# Patient Record
Sex: MALE | Race: WHITE | URBAN - METROPOLITAN AREA
[De-identification: names, ages, dates, MRNs, and addresses within clinical notes are randomized per-mention and may not be internally consistent; named-entity substitution may affect disease eponyms.]

---

## 2023-07-05 ENCOUNTER — HOSPITAL ENCOUNTER (OUTPATIENT)
Age: 78
Setting detail: SPECIMEN
Discharge: HOME | End: 2023-07-05

## 2023-07-05 LAB
ALBUMIN SERPL-MCNC: 4.4 G/DL (ref 3.5–5.2)
ALBUMIN/GLOB SERPL: 1.7 {RATIO} (ref 1–2.5)
ALP SERPL-CCNC: 74 U/L (ref 40–129)
ALT SERPL-CCNC: 26 U/L (ref 5–41)
ANION GAP SERPL CALCULATED.3IONS-SCNC: 16 MMOL/L (ref 9–17)
AST SERPL-CCNC: 28 U/L
BASOPHILS # BLD: 0.04 K/UL (ref 0–0.2)
BASOPHILS NFR BLD: 1 % (ref 0–2)
BILIRUB SERPL-MCNC: 0.6 MG/DL (ref 0.3–1.2)
BUN SERPL-MCNC: 18 MG/DL (ref 8–23)
CALCIUM SERPL-MCNC: 9.1 MG/DL (ref 8.6–10.4)
CHLORIDE SERPL-SCNC: 109 MMOL/L (ref 98–107)
CHOLEST SERPL-MCNC: 109 MG/DL
CHOLESTEROL/HDL RATIO: 2.1
CO2 SERPL-SCNC: 22 MMOL/L (ref 20–31)
CREAT SERPL-MCNC: 0.92 MG/DL (ref 0.7–1.2)
EOSINOPHIL # BLD: 0.17 K/UL (ref 0–0.44)
EOSINOPHILS RELATIVE PERCENT: 4 % (ref 1–4)
ERYTHROCYTE [DISTWIDTH] IN BLOOD BY AUTOMATED COUNT: 12.1 % (ref 11.8–14.4)
EST. AVERAGE GLUCOSE BLD GHB EST-MCNC: 105 MG/DL
GFR SERPL CREATININE-BSD FRML MDRD: >60 ML/MIN/1.73M2
GLUCOSE SERPL-MCNC: 102 MG/DL (ref 70–99)
HBA1C MFR BLD: 5.3 % (ref 4–6)
HCT VFR BLD AUTO: 44.1 % (ref 40.7–50.3)
HDLC SERPL-MCNC: 51 MG/DL
HGB BLD-MCNC: 15 G/DL (ref 13–17)
IMM GRANULOCYTES # BLD AUTO: <0.03 K/UL (ref 0–0.3)
IMM GRANULOCYTES NFR BLD: 0 %
LDLC SERPL CALC-MCNC: 45 MG/DL (ref 0–130)
LYMPHOCYTES # BLD: 29 % (ref 24–43)
LYMPHOCYTES NFR BLD: 1.14 K/UL (ref 1.1–3.7)
MCH RBC QN AUTO: 31.6 PG (ref 25.2–33.5)
MCHC RBC AUTO-ENTMCNC: 34 G/DL (ref 28.4–34.8)
MCV RBC AUTO: 92.8 FL (ref 82.6–102.9)
MONOCYTES NFR BLD: 0.35 K/UL (ref 0.1–1.2)
MONOCYTES NFR BLD: 9 % (ref 3–12)
NEUTROPHILS NFR BLD: 57 % (ref 36–65)
NEUTS SEG NFR BLD: 2.27 K/UL (ref 1.5–8.1)
NRBC BLD-RTO: 0 PER 100 WBC
PLATELET # BLD AUTO: 163 K/UL (ref 138–453)
PMV BLD AUTO: 9.5 FL (ref 8.1–13.5)
POTASSIUM SERPL-SCNC: 4.4 MMOL/L (ref 3.7–5.3)
PROT SERPL-MCNC: 7 G/DL (ref 6.4–8.3)
RBC # BLD AUTO: 4.75 M/UL (ref 4.21–5.77)
SODIUM SERPL-SCNC: 147 MMOL/L (ref 135–144)
T4 FREE SERPL-MCNC: 1.4 NG/DL (ref 0.9–1.7)
TRIGL SERPL-MCNC: 63 MG/DL
TSH SERPL DL<=0.05 MIU/L-ACNC: 0.48 UIU/ML (ref 0.3–5)
WBC OTHER # BLD: 4 K/UL (ref 3.5–11.3)

## 2023-10-09 ENCOUNTER — TELEPHONE (OUTPATIENT)
Age: 78
End: 2023-10-09

## 2023-10-09 DIAGNOSIS — Z01.818 PRE-OP EXAM: Primary | ICD-10-CM

## 2023-10-09 NOTE — TELEPHONE ENCOUNTER
Kee has been scheduled for sx on 10/25. I called and talked to him Thursday letting him know about the PAT on 10/11. Then I called him today to ask him to come to the office and sign consent. He said he would do that Wednesday. I already got clearance from his cardiologist Dr. Gloria Marinelli. I just need PCP clearance.

## 2023-10-11 ENCOUNTER — HOSPITAL ENCOUNTER (OUTPATIENT)
Dept: PREADMISSION TESTING | Age: 78
Discharge: HOME | End: 2023-10-11
Payer: MEDICARE

## 2023-10-11 VITALS
OXYGEN SATURATION: 100 % | RESPIRATION RATE: 16 BRPM | SYSTOLIC BLOOD PRESSURE: 119 MMHG | HEIGHT: 74 IN | DIASTOLIC BLOOD PRESSURE: 77 MMHG | HEART RATE: 55 BPM | WEIGHT: 196 LBS | BODY MASS INDEX: 25.15 KG/M2

## 2023-10-11 LAB
ANION GAP SERPL CALCULATED.3IONS-SCNC: 5 MMOL/L (ref 9–17)
BASOPHILS # BLD: 0 K/UL (ref 0–0.2)
BASOPHILS NFR BLD: 0 % (ref 0–2)
BUN SERPL-MCNC: 18 MG/DL (ref 8–23)
CALCIUM SERPL-MCNC: 8.5 MG/DL (ref 8.6–10.4)
CHLORIDE SERPL-SCNC: 106 MMOL/L (ref 98–107)
CO2 SERPL-SCNC: 30 MMOL/L (ref 20–31)
CREAT SERPL-MCNC: 0.9 MG/DL (ref 0.7–1.2)
EOSINOPHIL # BLD: 0.2 K/UL (ref 0–0.4)
EOSINOPHILS RELATIVE PERCENT: 5 % (ref 1–4)
ERYTHROCYTE [DISTWIDTH] IN BLOOD BY AUTOMATED COUNT: 13.6 % (ref 12.5–15.4)
GFR SERPL CREATININE-BSD FRML MDRD: >60 ML/MIN/1.73M2
GLUCOSE SERPL-MCNC: 117 MG/DL (ref 70–99)
HCT VFR BLD AUTO: 38.8 % (ref 41–53)
HGB BLD-MCNC: 13.9 G/DL (ref 13.5–17.5)
LYMPHOCYTES NFR BLD: 1 K/UL (ref 1–4.8)
LYMPHOCYTES RELATIVE PERCENT: 27 % (ref 24–44)
MCH RBC QN AUTO: 32.6 PG (ref 26–34)
MCHC RBC AUTO-ENTMCNC: 35.7 G/DL (ref 31–37)
MCV RBC AUTO: 91.5 FL (ref 80–100)
MONOCYTES NFR BLD: 0.4 K/UL (ref 0.1–1.2)
MONOCYTES NFR BLD: 10 % (ref 2–11)
NEUTROPHILS NFR BLD: 58 % (ref 36–66)
NEUTS SEG NFR BLD: 2.1 K/UL (ref 1.8–7.7)
PLATELET # BLD AUTO: 149 K/UL (ref 140–450)
PMV BLD AUTO: 7 FL (ref 6–12)
POTASSIUM SERPL-SCNC: 4.6 MMOL/L (ref 3.7–5.3)
RBC # BLD AUTO: 4.24 M/UL (ref 4.5–5.9)
SODIUM SERPL-SCNC: 141 MMOL/L (ref 135–144)
WBC OTHER # BLD: 3.6 K/UL (ref 3.5–11)

## 2023-10-11 PROCEDURE — 80048 BASIC METABOLIC PNL TOTAL CA: CPT

## 2023-10-11 PROCEDURE — 36415 COLL VENOUS BLD VENIPUNCTURE: CPT

## 2023-10-11 PROCEDURE — 87641 MR-STAPH DNA AMP PROBE: CPT

## 2023-10-11 PROCEDURE — 85025 COMPLETE CBC W/AUTO DIFF WBC: CPT

## 2023-10-11 RX ORDER — LEVOTHYROXINE SODIUM 0.2 MG/1
200 TABLET ORAL DAILY
COMMUNITY

## 2023-10-11 RX ORDER — PRIMIDONE 50 MG/1
TABLET ORAL
COMMUNITY
Start: 2023-08-27

## 2023-10-11 RX ORDER — TAMSULOSIN HYDROCHLORIDE 0.4 MG/1
CAPSULE ORAL
COMMUNITY
Start: 2023-08-16

## 2023-10-11 RX ORDER — AMOXICILLIN 500 MG/1
2000 TABLET, FILM COATED ORAL
COMMUNITY
Start: 2023-07-17

## 2023-10-11 RX ORDER — ATORVASTATIN CALCIUM 80 MG/1
TABLET, FILM COATED ORAL
COMMUNITY
Start: 2023-07-10

## 2023-10-11 RX ORDER — LATANOPROST 50 UG/ML
SOLUTION/ DROPS OPHTHALMIC
COMMUNITY
Start: 2023-09-13

## 2023-10-11 RX ORDER — METOPROLOL SUCCINATE 50 MG/1
TABLET, EXTENDED RELEASE ORAL 2 TIMES DAILY
COMMUNITY
Start: 2023-07-10

## 2023-10-11 ASSESSMENT — PAIN SCALES - GENERAL: PAINLEVEL_OUTOF10: 4

## 2023-10-11 ASSESSMENT — PAIN DESCRIPTION - LOCATION: LOCATION: HIP

## 2023-10-11 ASSESSMENT — PAIN DESCRIPTION - ORIENTATION: ORIENTATION: RIGHT

## 2023-10-11 ASSESSMENT — PAIN DESCRIPTION - ONSET: ONSET: AWAKENED FROM SLEEP

## 2023-10-11 ASSESSMENT — PAIN - FUNCTIONAL ASSESSMENT: PAIN_FUNCTIONAL_ASSESSMENT: PREVENTS OR INTERFERES SOME ACTIVE ACTIVITIES AND ADLS

## 2023-10-11 ASSESSMENT — PAIN DESCRIPTION - PAIN TYPE: TYPE: CHRONIC PAIN

## 2023-10-12 LAB
MRSA, DNA, NASAL: NEGATIVE
SPECIMEN DESCRIPTION: NORMAL

## 2023-10-13 ENCOUNTER — ANESTHESIA EVENT (OUTPATIENT)
Dept: OPERATING ROOM | Age: 78
End: 2023-10-13
Payer: MEDICARE

## 2023-10-13 NOTE — PROGRESS NOTES
[facility] ([facility]) Joint Replacement Pre-surgical Assessment    Scheduled Surgery Date: 10/25/23  Surgery Time: 1000    Surgeon: Dr. Cole Rice  Procedure: right Total Hip    Primary Insurance Coverage [facility] Medicare Complete  Pre-op class attended 10/11/23    PCP: Sandy Medeiros MD  Clearance received by PCP: Yes    Anticipated Discharge Plan: home  Agency (if applicable):     Significant PMH:   Surgical History    Procedure Laterality Date Comment Source   CARDIAC SURGERY       CHOLECYSTECTOMY       COLONOSCOPY       CORONARY ANGIOPLASTY WITH STENT PLACEMENT  02/2022 x3    CORONARY ARTERY BYPASS GRAFT  02/2022     JOINT REPLACEMENT Left  hip    TONSILLECTOMY                 Medical History    Diagnosis Date Comment Source   Arthritis      Atrial fibrillation ([address])      CAD (coronary artery disease) 02/2022 x3 stents    Essential tremor      Glaucoma      Hyperlipidemia      Hypertension      Scoliosis      Thyroid disease      Under care of team  cardiology- Dr. Gloria Marinelli           Smoking history: none    Alcohol history: Current alcohol use: occasionally 1-2 times per week    Concerns prior to surgery: None reported    Electronically signed by: Pancho Acevedo RN on 10/13/2023 at 1:54 PM

## 2023-10-17 ENCOUNTER — NURSE ONLY (OUTPATIENT)
Age: 78
End: 2023-10-17

## 2023-10-17 NOTE — PROGRESS NOTES
After obtaining consent, and per orders of Dr. Rowan Castillo, injection of Fluad given in Right deltoid by Kieran Jung MA. Patient instructed to remain in clinic for 20 minutes afterwards, and to report any adverse reaction to me immediately.

## 2023-10-23 ENCOUNTER — CASE MANAGEMENT (OUTPATIENT)
Age: 78
End: 2023-10-23

## 2023-10-23 NOTE — PROGRESS NOTES
Pre-op phone call    Spoke with patient preop concerning:     Procedure right  Hip arthroplasty with Dr. Aleisha Wesley  on  10/25/23 . DME: Patient has rolling walker. DME: Patient needs  none . Therapy after surgery: Pt states he will do home exercise. Other concerns: No concerns reported.

## 2023-10-24 NOTE — H&P
ORTHOPEDIC PREOP HISTORY AND PHYSICAL      HPI / Chief Complaint  Giovanna Ragsdale is a 77 y.o. male who presents for right hip pain    Past Medical History  Stephanie Carson  has a past medical history of Arthritis, Atrial fibrillation ([address]), CAD (coronary artery disease), Essential tremor, Glaucoma, Hyperlipidemia, Hypertension, Scoliosis, Thyroid disease, and Under care of team.    Past Surgical History  Stephanie Carson  has a past surgical history that includes Coronary artery bypass graft (02/2022); Cardiac surgery; joint replacement (Left); Tonsillectomy; Coronary angioplasty with stent (02/2022); Cholecystectomy; and Colonoscopy. Current Medications  Current Facility-Administered Medications   Medication Dose Route Frequency Provider Last Rate Last Admin    [START ON 10/25/2023] morphine 10 mg, ketorolac (TORADOL) 30 MG/ML 30 mg, EPINEPHrine 0.5 mg, ropivacaine (NAROPIN) 0.5% 60 mL IV syringe   Other Once Olga Vargas MD         Current Outpatient Medications   Medication Sig Dispense Refill    amoxicillin (AMOXIL) 500 MG tablet Take 4 tablets by mouth once as needed For dental appointments      calcium carbonate 1500 (600 Ca) MG TABS tablet Take 1 tablet by mouth daily (with breakfast)      FERROUS SULFATE PO Take 65 mg by mouth daily (with breakfast)      Multiple Vitamin (MULTIVITAMIN ADULT PO) Take 0.5 tablets by mouth 2 times daily      apixaban (ELIQUIS) 5 MG TABS tablet Take 1 tablet twice a day by oral route. Aspirin 81 MG CAPS Take 1 capsule every day by oral route. (Patient not taking: Reported on 10/11/2023)      atorvastatin (LIPITOR) 80 MG tablet       latanoprost (XALATAN) 0.005 % ophthalmic solution       levothyroxine (SYNTHROID) 200 MCG tablet Take 1 tablet by mouth daily      metoprolol succinate (TOPROL XL) 50 MG extended release tablet in the morning and at bedtime      primidone (MYSOLINE) 50 MG tablet       tamsulosin (FLOMAX) 0.4 MG capsule          Allergies  Allergies have been reviewed. Clive Chavez is allergic to poison ivy extract and ropinirole. Social History  Clive Chavez  reports that he has never smoked. He has never used smokeless tobacco. He reports current alcohol use. He reports that he does not use drugs. Family History  Prince's family history is not on file. Review of Systems   History obtained from the patient. REVIEW OF SYSTEMS:   Constitution: negative for fever, chills    Physical Exam  There were no vitals taken for this visit. General Appearance: in no distress  HEENT: Normocephalic  CV: brisk cap refill to extremities  Lungs: Nonlabored breathing  Abdomen: soft  Extremities: Right hip skin intact    Assessment and Plan  Hira Ramos is a 77 y.o. old male with right hip osteoarthritis. Plan is for right anterior total hip replacement. This note is created with the assistance of a speech recognition program.  While intending to generate a document that actually reflects the content of the visit, the document can still have some errors including those of syntax and sound a like substitutions which may escape proof reading. It such instances, actual meaning can be extrapolated by contextual diversion.

## 2023-10-25 ENCOUNTER — APPOINTMENT (OUTPATIENT)
Dept: GENERAL RADIOLOGY | Age: 78
End: 2023-10-25
Attending: ORTHOPAEDIC SURGERY
Payer: MEDICARE

## 2023-10-25 ENCOUNTER — HOSPITAL ENCOUNTER (OUTPATIENT)
Age: 78
Discharge: HOME | End: 2023-10-26
Attending: ORTHOPAEDIC SURGERY | Admitting: ORTHOPAEDIC SURGERY
Payer: MEDICARE

## 2023-10-25 ENCOUNTER — ANESTHESIA (OUTPATIENT)
Dept: OPERATING ROOM | Age: 78
End: 2023-10-25
Payer: MEDICARE

## 2023-10-25 DIAGNOSIS — G89.18 POST-OP PAIN: Primary | ICD-10-CM

## 2023-10-25 PROBLEM — M16.11 OSTEOARTHRITIS OF RIGHT HIP, UNSPECIFIED OSTEOARTHRITIS TYPE: Status: ACTIVE | Noted: 2023-10-25

## 2023-10-25 PROCEDURE — 3600000005 HC SURGERY LEVEL 5 BASE: Performed by: ORTHOPAEDIC SURGERY

## 2023-10-25 PROCEDURE — 97535 SELF CARE MNGMENT TRAINING: CPT

## 2023-10-25 PROCEDURE — G0378 HOSPITAL OBSERVATION PER HR: HCPCS

## 2023-10-25 PROCEDURE — C1776 JOINT DEVICE (IMPLANTABLE): HCPCS | Performed by: ORTHOPAEDIC SURGERY

## 2023-10-25 PROCEDURE — 97162 PT EVAL MOD COMPLEX 30 MIN: CPT

## 2023-10-25 PROCEDURE — 97166 OT EVAL MOD COMPLEX 45 MIN: CPT

## 2023-10-25 PROCEDURE — 97110 THERAPEUTIC EXERCISES: CPT

## 2023-10-25 PROCEDURE — 6370000000 HC RX 637 (ALT 250 FOR IP)

## 2023-10-25 PROCEDURE — 7100000001 HC PACU RECOVERY - ADDTL 15 MIN: Performed by: ORTHOPAEDIC SURGERY

## 2023-10-25 PROCEDURE — 2580000003 HC RX 258: Performed by: ORTHOPAEDIC SURGERY

## 2023-10-25 PROCEDURE — 27130 TOTAL HIP ARTHROPLASTY: CPT | Performed by: ORTHOPAEDIC SURGERY

## 2023-10-25 PROCEDURE — 2500000003 HC RX 250 WO HCPCS: Performed by: REGISTERED NURSE

## 2023-10-25 PROCEDURE — 6360000002 HC RX W HCPCS: Performed by: ORTHOPAEDIC SURGERY

## 2023-10-25 PROCEDURE — 97116 GAIT TRAINING THERAPY: CPT

## 2023-10-25 PROCEDURE — 2709999900 HC NON-CHARGEABLE SUPPLY: Performed by: ORTHOPAEDIC SURGERY

## 2023-10-25 PROCEDURE — 2500000003 HC RX 250 WO HCPCS: Performed by: ORTHOPAEDIC SURGERY

## 2023-10-25 PROCEDURE — 7100000000 HC PACU RECOVERY - FIRST 15 MIN: Performed by: ORTHOPAEDIC SURGERY

## 2023-10-25 PROCEDURE — 6360000002 HC RX W HCPCS: Performed by: ANESTHESIOLOGY

## 2023-10-25 PROCEDURE — 97530 THERAPEUTIC ACTIVITIES: CPT

## 2023-10-25 PROCEDURE — 2580000003 HC RX 258: Performed by: STUDENT IN AN ORGANIZED HEALTH CARE EDUCATION/TRAINING PROGRAM

## 2023-10-25 PROCEDURE — 3700000001 HC ADD 15 MINUTES (ANESTHESIA): Performed by: ORTHOPAEDIC SURGERY

## 2023-10-25 PROCEDURE — 6360000002 HC RX W HCPCS: Performed by: REGISTERED NURSE

## 2023-10-25 PROCEDURE — 6370000000 HC RX 637 (ALT 250 FOR IP): Performed by: ORTHOPAEDIC SURGERY

## 2023-10-25 PROCEDURE — 3600000015 HC SURGERY LEVEL 5 ADDTL 15MIN: Performed by: ORTHOPAEDIC SURGERY

## 2023-10-25 PROCEDURE — 3700000000 HC ANESTHESIA ATTENDED CARE: Performed by: ORTHOPAEDIC SURGERY

## 2023-10-25 DEVICE — SLEEVE FEM -3MM OFFSET HIP TYP 1 TAPR FOR CERAMIC BIOLOX: Type: IMPLANTABLE DEVICE | Site: HIP | Status: FUNCTIONAL

## 2023-10-25 DEVICE — IMPLANTABLE DEVICE: Type: IMPLANTABLE DEVICE | Site: HIP | Status: FUNCTIONAL

## 2023-10-25 DEVICE — STEM FEM SZ 14 L113MM NK L39.6MM 133DEG HI OFFSET HIP TI: Type: IMPLANTABLE DEVICE | Site: HIP | Status: FUNCTIONAL

## 2023-10-25 DEVICE — LINER ACET NEUT G 40 MM VIVACIT-E G7: Type: IMPLANTABLE DEVICE | Site: HIP | Status: FUNCTIONAL

## 2023-10-25 DEVICE — HEAD FEM DIA40MM HIP BIOLOX DELT OPT FOR G7 ACET SYS: Type: IMPLANTABLE DEVICE | Site: HIP | Status: FUNCTIONAL

## 2023-10-25 RX ORDER — ACETAMINOPHEN 500 MG
TABLET ORAL
Status: COMPLETED
Start: 2023-10-25 | End: 2023-10-25

## 2023-10-25 RX ORDER — ATORVASTATIN CALCIUM 40 MG/1
80 TABLET, FILM COATED ORAL NIGHTLY
Status: DISCONTINUED | OUTPATIENT
Start: 2023-10-25 | End: 2023-10-26 | Stop reason: HOSPADM

## 2023-10-25 RX ORDER — PROPOFOL 10 MG/ML
INJECTION, EMULSION INTRAVENOUS CONTINUOUS PRN
Status: DISCONTINUED | OUTPATIENT
Start: 2023-10-25 | End: 2023-10-25 | Stop reason: SDUPTHER

## 2023-10-25 RX ORDER — OXYCODONE HYDROCHLORIDE AND ACETAMINOPHEN 5; 325 MG/1; MG/1
2 TABLET ORAL EVERY 4 HOURS PRN
Status: DISCONTINUED | OUTPATIENT
Start: 2023-10-25 | End: 2023-10-26 | Stop reason: HOSPADM

## 2023-10-25 RX ORDER — TAMSULOSIN HYDROCHLORIDE 0.4 MG/1
0.4 CAPSULE ORAL DAILY
Status: DISCONTINUED | OUTPATIENT
Start: 2023-10-25 | End: 2023-10-26 | Stop reason: HOSPADM

## 2023-10-25 RX ORDER — 0.9 % SODIUM CHLORIDE 0.9 %
250 INTRAVENOUS SOLUTION INTRAVENOUS ONCE
Status: COMPLETED | OUTPATIENT
Start: 2023-10-25 | End: 2023-10-26

## 2023-10-25 RX ORDER — HYDROXYZINE HYDROCHLORIDE 10 MG/1
10 TABLET, FILM COATED ORAL EVERY 8 HOURS PRN
Status: DISCONTINUED | OUTPATIENT
Start: 2023-10-25 | End: 2023-10-26 | Stop reason: HOSPADM

## 2023-10-25 RX ORDER — ONDANSETRON 4 MG/1
4 TABLET, ORALLY DISINTEGRATING ORAL EVERY 8 HOURS PRN
Status: DISCONTINUED | OUTPATIENT
Start: 2023-10-25 | End: 2023-10-26 | Stop reason: HOSPADM

## 2023-10-25 RX ORDER — BUPIVACAINE HYDROCHLORIDE 7.5 MG/ML
INJECTION, SOLUTION INTRASPINAL
Status: COMPLETED | OUTPATIENT
Start: 2023-10-25 | End: 2023-10-25

## 2023-10-25 RX ORDER — KETOROLAC TROMETHAMINE 15 MG/ML
15 INJECTION, SOLUTION INTRAMUSCULAR; INTRAVENOUS EVERY 6 HOURS
Status: DISCONTINUED | OUTPATIENT
Start: 2023-10-25 | End: 2023-10-26 | Stop reason: HOSPADM

## 2023-10-25 RX ORDER — MIDAZOLAM HYDROCHLORIDE 2 MG/2ML
2 INJECTION, SOLUTION INTRAMUSCULAR; INTRAVENOUS
Status: DISCONTINUED | OUTPATIENT
Start: 2023-10-25 | End: 2023-10-25

## 2023-10-25 RX ORDER — ASPIRIN 81 MG/1
81 TABLET ORAL 2 TIMES DAILY
Qty: 5 TABLET | Refills: 0 | Status: SHIPPED | OUTPATIENT
Start: 2023-10-25 | End: 2023-10-27

## 2023-10-25 RX ORDER — ONDANSETRON 4 MG/1
4 TABLET, FILM COATED ORAL EVERY 6 HOURS PRN
Qty: 21 TABLET | Refills: 0 | Status: SHIPPED | OUTPATIENT
Start: 2023-10-25 | End: 2023-11-01

## 2023-10-25 RX ORDER — HYDRALAZINE HYDROCHLORIDE 20 MG/ML
10 INJECTION INTRAMUSCULAR; INTRAVENOUS
Status: DISCONTINUED | OUTPATIENT
Start: 2023-10-25 | End: 2023-10-25

## 2023-10-25 RX ORDER — CEFAZOLIN 2 G/1
INJECTION, POWDER, FOR SOLUTION INTRAMUSCULAR; INTRAVENOUS
Status: DISPENSED
Start: 2023-10-25 | End: 2023-10-25

## 2023-10-25 RX ORDER — SODIUM CHLORIDE 0.9 % (FLUSH) 0.9 %
5-40 SYRINGE (ML) INJECTION EVERY 12 HOURS SCHEDULED
Status: DISCONTINUED | OUTPATIENT
Start: 2023-10-25 | End: 2023-10-25

## 2023-10-25 RX ORDER — SENNA AND DOCUSATE SODIUM 50; 8.6 MG/1; MG/1
1 TABLET, FILM COATED ORAL 2 TIMES DAILY
Status: DISCONTINUED | OUTPATIENT
Start: 2023-10-25 | End: 2023-10-26 | Stop reason: HOSPADM

## 2023-10-25 RX ORDER — METOPROLOL SUCCINATE 50 MG/1
50 TABLET, EXTENDED RELEASE ORAL 2 TIMES DAILY
Status: DISCONTINUED | OUTPATIENT
Start: 2023-10-25 | End: 2023-10-26 | Stop reason: HOSPADM

## 2023-10-25 RX ORDER — MORPHINE SULFATE 10 MG/ML
INJECTION, SOLUTION INTRAMUSCULAR; INTRAVENOUS PRN
Status: DISCONTINUED | OUTPATIENT
Start: 2023-10-25 | End: 2023-10-25 | Stop reason: SDUPTHER

## 2023-10-25 RX ORDER — OXYCODONE HYDROCHLORIDE AND ACETAMINOPHEN 5; 325 MG/1; MG/1
1 TABLET ORAL EVERY 4 HOURS PRN
Status: DISCONTINUED | OUTPATIENT
Start: 2023-10-25 | End: 2023-10-26 | Stop reason: HOSPADM

## 2023-10-25 RX ORDER — ACETAMINOPHEN 500 MG
1000 TABLET ORAL ONCE
Status: COMPLETED | OUTPATIENT
Start: 2023-10-25 | End: 2023-10-25

## 2023-10-25 RX ORDER — FENTANYL CITRATE 50 UG/ML
INJECTION, SOLUTION INTRAMUSCULAR; INTRAVENOUS PRN
Status: DISCONTINUED | OUTPATIENT
Start: 2023-10-25 | End: 2023-10-25 | Stop reason: SDUPTHER

## 2023-10-25 RX ORDER — ACETAMINOPHEN 325 MG/1
650 TABLET ORAL EVERY 6 HOURS
Status: DISCONTINUED | OUTPATIENT
Start: 2023-10-25 | End: 2023-10-26 | Stop reason: HOSPADM

## 2023-10-25 RX ORDER — ONDANSETRON 2 MG/ML
INJECTION INTRAMUSCULAR; INTRAVENOUS PRN
Status: DISCONTINUED | OUTPATIENT
Start: 2023-10-25 | End: 2023-10-25 | Stop reason: SDUPTHER

## 2023-10-25 RX ORDER — MORPHINE SULFATE 2 MG/ML
1 INJECTION, SOLUTION INTRAMUSCULAR; INTRAVENOUS EVERY 5 MIN PRN
Status: DISCONTINUED | OUTPATIENT
Start: 2023-10-25 | End: 2023-10-25

## 2023-10-25 RX ORDER — SODIUM CHLORIDE 0.9 % (FLUSH) 0.9 %
5-40 SYRINGE (ML) INJECTION PRN
Status: DISCONTINUED | OUTPATIENT
Start: 2023-10-25 | End: 2023-10-26 | Stop reason: HOSPADM

## 2023-10-25 RX ORDER — OXYCODONE HYDROCHLORIDE AND ACETAMINOPHEN 5; 325 MG/1; MG/1
1-2 TABLET ORAL EVERY 6 HOURS PRN
Qty: 40 TABLET | Refills: 0 | Status: SHIPPED | OUTPATIENT
Start: 2023-10-25 | End: 2023-11-01

## 2023-10-25 RX ORDER — OXYCODONE HYDROCHLORIDE 5 MG/1
10 TABLET ORAL PRN
Status: DISCONTINUED | OUTPATIENT
Start: 2023-10-25 | End: 2023-10-25

## 2023-10-25 RX ORDER — METOCLOPRAMIDE HYDROCHLORIDE 5 MG/ML
10 INJECTION INTRAMUSCULAR; INTRAVENOUS
Status: DISCONTINUED | OUTPATIENT
Start: 2023-10-25 | End: 2023-10-25

## 2023-10-25 RX ORDER — CEPHALEXIN 500 MG/1
500 CAPSULE ORAL 4 TIMES DAILY
Qty: 4 CAPSULE | Refills: 0 | Status: SHIPPED | OUTPATIENT
Start: 2023-10-25 | End: 2023-10-26

## 2023-10-25 RX ORDER — DEXAMETHASONE SODIUM PHOSPHATE 10 MG/ML
8 INJECTION, SOLUTION INTRAMUSCULAR; INTRAVENOUS ONCE
Status: DISCONTINUED | OUTPATIENT
Start: 2023-10-25 | End: 2023-10-25

## 2023-10-25 RX ORDER — ONDANSETRON 2 MG/ML
4 INJECTION INTRAMUSCULAR; INTRAVENOUS EVERY 6 HOURS PRN
Status: DISCONTINUED | OUTPATIENT
Start: 2023-10-25 | End: 2023-10-26 | Stop reason: HOSPADM

## 2023-10-25 RX ORDER — SODIUM CHLORIDE 0.9 % (FLUSH) 0.9 %
5-40 SYRINGE (ML) INJECTION EVERY 12 HOURS SCHEDULED
Status: DISCONTINUED | OUTPATIENT
Start: 2023-10-25 | End: 2023-10-26 | Stop reason: HOSPADM

## 2023-10-25 RX ORDER — DIPHENHYDRAMINE HYDROCHLORIDE 50 MG/ML
12.5 INJECTION INTRAMUSCULAR; INTRAVENOUS
Status: DISCONTINUED | OUTPATIENT
Start: 2023-10-25 | End: 2023-10-25

## 2023-10-25 RX ORDER — SODIUM CHLORIDE 0.9 % (FLUSH) 0.9 %
5-40 SYRINGE (ML) INJECTION PRN
Status: DISCONTINUED | OUTPATIENT
Start: 2023-10-25 | End: 2023-10-25

## 2023-10-25 RX ORDER — MEPERIDINE HYDROCHLORIDE 50 MG/ML
12.5 INJECTION INTRAMUSCULAR; INTRAVENOUS; SUBCUTANEOUS ONCE
Status: DISCONTINUED | OUTPATIENT
Start: 2023-10-25 | End: 2023-10-25

## 2023-10-25 RX ORDER — LATANOPROST 50 UG/ML
1 SOLUTION/ DROPS OPHTHALMIC NIGHTLY
Status: DISCONTINUED | OUTPATIENT
Start: 2023-10-25 | End: 2023-10-26 | Stop reason: HOSPADM

## 2023-10-25 RX ORDER — POLYETHYLENE GLYCOL 3350 17 G/17G
17 POWDER, FOR SOLUTION ORAL DAILY PRN
Status: DISCONTINUED | OUTPATIENT
Start: 2023-10-25 | End: 2023-10-26 | Stop reason: HOSPADM

## 2023-10-25 RX ORDER — LIDOCAINE HYDROCHLORIDE 10 MG/ML
INJECTION, SOLUTION INFILTRATION; PERINEURAL PRN
Status: DISCONTINUED | OUTPATIENT
Start: 2023-10-25 | End: 2023-10-25 | Stop reason: SDUPTHER

## 2023-10-25 RX ORDER — LIDOCAINE HYDROCHLORIDE 10 MG/ML
1 INJECTION, SOLUTION EPIDURAL; INFILTRATION; INTRACAUDAL; PERINEURAL
Status: DISCONTINUED | OUTPATIENT
Start: 2023-10-25 | End: 2023-10-25

## 2023-10-25 RX ORDER — SODIUM CHLORIDE 9 MG/ML
INJECTION, SOLUTION INTRAVENOUS PRN
Status: DISCONTINUED | OUTPATIENT
Start: 2023-10-25 | End: 2023-10-25

## 2023-10-25 RX ORDER — PRIMIDONE 50 MG/1
50 TABLET ORAL NIGHTLY
Status: DISCONTINUED | OUTPATIENT
Start: 2023-10-25 | End: 2023-10-26 | Stop reason: HOSPADM

## 2023-10-25 RX ORDER — LEVOTHYROXINE SODIUM 0.2 MG/1
200 TABLET ORAL DAILY
Status: DISCONTINUED | OUTPATIENT
Start: 2023-10-26 | End: 2023-10-26 | Stop reason: HOSPADM

## 2023-10-25 RX ORDER — ASPIRIN 81 MG/1
81 TABLET ORAL 2 TIMES DAILY
Status: DISCONTINUED | OUTPATIENT
Start: 2023-10-25 | End: 2023-10-26 | Stop reason: HOSPADM

## 2023-10-25 RX ORDER — ONDANSETRON 2 MG/ML
4 INJECTION INTRAMUSCULAR; INTRAVENOUS
Status: DISCONTINUED | OUTPATIENT
Start: 2023-10-25 | End: 2023-10-25

## 2023-10-25 RX ORDER — SODIUM CHLORIDE, SODIUM LACTATE, POTASSIUM CHLORIDE, CALCIUM CHLORIDE 600; 310; 30; 20 MG/100ML; MG/100ML; MG/100ML; MG/100ML
INJECTION, SOLUTION INTRAVENOUS CONTINUOUS
Status: DISCONTINUED | OUTPATIENT
Start: 2023-10-25 | End: 2023-10-25

## 2023-10-25 RX ORDER — OXYCODONE HYDROCHLORIDE 5 MG/1
5 TABLET ORAL PRN
Status: DISCONTINUED | OUTPATIENT
Start: 2023-10-25 | End: 2023-10-25

## 2023-10-25 RX ORDER — MAGNESIUM HYDROXIDE/ALUMINUM HYDROXICE/SIMETHICONE 120; 1200; 1200 MG/30ML; MG/30ML; MG/30ML
15 SUSPENSION ORAL EVERY 6 HOURS PRN
Status: DISCONTINUED | OUTPATIENT
Start: 2023-10-25 | End: 2023-10-26 | Stop reason: HOSPADM

## 2023-10-25 RX ORDER — TRANEXAMIC ACID 10 MG/ML
INJECTION, SOLUTION INTRAVENOUS
Status: COMPLETED
Start: 2023-10-25 | End: 2023-10-25

## 2023-10-25 RX ORDER — SODIUM CHLORIDE 9 MG/ML
INJECTION, SOLUTION INTRAVENOUS PRN
Status: DISCONTINUED | OUTPATIENT
Start: 2023-10-25 | End: 2023-10-26 | Stop reason: HOSPADM

## 2023-10-25 RX ORDER — TRANEXAMIC ACID 10 MG/ML
1000 INJECTION, SOLUTION INTRAVENOUS
Status: COMPLETED | OUTPATIENT
Start: 2023-10-25 | End: 2023-10-25

## 2023-10-25 RX ORDER — LABETALOL HYDROCHLORIDE 5 MG/ML
10 INJECTION, SOLUTION INTRAVENOUS
Status: DISCONTINUED | OUTPATIENT
Start: 2023-10-25 | End: 2023-10-25

## 2023-10-25 RX ORDER — KETOROLAC TROMETHAMINE 30 MG/ML
INJECTION, SOLUTION INTRAMUSCULAR; INTRAVENOUS PRN
Status: DISCONTINUED | OUTPATIENT
Start: 2023-10-25 | End: 2023-10-25 | Stop reason: SDUPTHER

## 2023-10-25 RX ADMIN — SODIUM CHLORIDE, POTASSIUM CHLORIDE, SODIUM LACTATE AND CALCIUM CHLORIDE: 600; 310; 30; 20 INJECTION, SOLUTION INTRAVENOUS at 07:59

## 2023-10-25 RX ADMIN — CEFAZOLIN 2000 MG: 2 INJECTION, POWDER, FOR SOLUTION INTRAMUSCULAR; INTRAVENOUS at 17:25

## 2023-10-25 RX ADMIN — KETOROLAC TROMETHAMINE 15 MG: 30 INJECTION INTRAMUSCULAR; INTRAVENOUS at 10:49

## 2023-10-25 RX ADMIN — ONDANSETRON 4 MG: 2 INJECTION INTRAMUSCULAR; INTRAVENOUS at 10:49

## 2023-10-25 RX ADMIN — ASPIRIN 81 MG: 81 TABLET, COATED ORAL at 22:19

## 2023-10-25 RX ADMIN — ASPIRIN 81 MG: 81 TABLET, COATED ORAL at 14:54

## 2023-10-25 RX ADMIN — ACETAMINOPHEN 650 MG: 325 TABLET ORAL at 14:54

## 2023-10-25 RX ADMIN — ATORVASTATIN CALCIUM 80 MG: 40 TABLET, FILM COATED ORAL at 22:19

## 2023-10-25 RX ADMIN — ACETAMINOPHEN 1000 MG: 500 TABLET ORAL at 07:59

## 2023-10-25 RX ADMIN — TAMSULOSIN HYDROCHLORIDE 0.4 MG: 0.4 CAPSULE ORAL at 14:55

## 2023-10-25 RX ADMIN — ACETAMINOPHEN 650 MG: 325 TABLET ORAL at 22:19

## 2023-10-25 RX ADMIN — SENNOSIDES AND DOCUSATE SODIUM 1 TABLET: 50; 8.6 TABLET ORAL at 22:19

## 2023-10-25 RX ADMIN — FENTANYL CITRATE 50 MCG: 50 INJECTION, SOLUTION INTRAMUSCULAR; INTRAVENOUS at 09:33

## 2023-10-25 RX ADMIN — MORPHINE SULFATE 5 MG: 10 INJECTION, SOLUTION INTRAMUSCULAR; INTRAVENOUS at 11:18

## 2023-10-25 RX ADMIN — MORPHINE SULFATE 5 MG: 10 INJECTION, SOLUTION INTRAMUSCULAR; INTRAVENOUS at 11:14

## 2023-10-25 RX ADMIN — FENTANYL CITRATE 25 MCG: 50 INJECTION, SOLUTION INTRAMUSCULAR; INTRAVENOUS at 09:05

## 2023-10-25 RX ADMIN — Medication 1000 MG: at 07:59

## 2023-10-25 RX ADMIN — PROPOFOL 150 MCG/KG/MIN: 10 INJECTION, EMULSION INTRAVENOUS at 08:59

## 2023-10-25 RX ADMIN — SODIUM CHLORIDE, POTASSIUM CHLORIDE, SODIUM LACTATE AND CALCIUM CHLORIDE: 600; 310; 30; 20 INJECTION, SOLUTION INTRAVENOUS at 10:49

## 2023-10-25 RX ADMIN — CEFAZOLIN 2000 MG: 2 INJECTION, POWDER, FOR SOLUTION INTRAMUSCULAR; INTRAVENOUS at 09:14

## 2023-10-25 RX ADMIN — BUPIVACAINE HYDROCHLORIDE IN DEXTROSE 15 MG: 7.5 INJECTION, SOLUTION SUBARACHNOID at 08:59

## 2023-10-25 RX ADMIN — PRIMIDONE 50 MG: 50 TABLET ORAL at 22:19

## 2023-10-25 RX ADMIN — SODIUM CHLORIDE, PRESERVATIVE FREE 10 ML: 5 INJECTION INTRAVENOUS at 22:19

## 2023-10-25 RX ADMIN — FENTANYL CITRATE 25 MCG: 50 INJECTION, SOLUTION INTRAMUSCULAR; INTRAVENOUS at 08:52

## 2023-10-25 RX ADMIN — TRANEXAMIC ACID 1000 MG: 10 INJECTION, SOLUTION INTRAVENOUS at 09:32

## 2023-10-25 RX ADMIN — LIDOCAINE HYDROCHLORIDE 40 MG: 10 INJECTION, SOLUTION INFILTRATION; PERINEURAL at 08:59

## 2023-10-25 ASSESSMENT — PAIN SCALES - GENERAL
PAINLEVEL_OUTOF10: 0

## 2023-10-25 ASSESSMENT — PAIN - FUNCTIONAL ASSESSMENT
PAIN_FUNCTIONAL_ASSESSMENT: 0-10
PAIN_FUNCTIONAL_ASSESSMENT: ACTIVITIES ARE NOT PREVENTED

## 2023-10-25 NOTE — PROGRESS NOTES
evaluation, education, & procurement, Safety education & training, Self-Care / ADL     Restrictions  Restrictions/Precautions  Restrictions/Precautions: Fall Risk, Surgical Protocols  Implants present? : Metal implants  Position Activity Restriction  Other position/activity restrictions: POD # 0 (R) anterior CRISTOFER, Dr. Albertus Opitz; WBAT. Subjective   General  Patient assessed for rehabilitation services?: Yes  Family / Caregiver Present: Yes (spouse)  Subjective  Subjective: Patient reports no pain. General Comment  Comments: RN OK for evaluation. Social/Functional History  Social/Functional History  Lives With: Spouse  Type of Home: House  Home Layout: Two level, Performs ADL's on one level, Laundry in basement (flight from basement B rails)  Home Access: Stairs to enter with rails  Entrance Stairs - Number of Steps: 1  Entrance Stairs - Rails: Both  Bathroom Shower/Tub: Walk-in shower  Bathroom Toilet: Handicap height  Bathroom Equipment: Grab bars in shower, Shower chair  Home Equipment: Walker, rolling, Cane  Has the patient had two or more falls in the past year or any fall with injury in the past year?: No  ADL Assistance: [address]  (Independent with cleaning/washing dishes, wife cooks and does laundry)  Ambulation Assistance: Independent  Transfer Assistance: Independent  Active : Yes  Mode of Transportation: Car  Occupation: Retired  Leisure & Hobbies: gardening, outdoors       Objective              Safety Devices  Type of Devices: Call light within reach; Left in chair; All fall risk precautions in place;Gait belt (wife present in room)  Restraints  Restraints Initially in Place: No    Balance  Sitting: Impaired (static/dynamic-SBA)  Standing: Impaired (static-MIN, dynamic-MIN-MOD x2)    Functional mobility  Overall Level of Assistance: Additional time; Adaptive equipment; Moderate assistance;Minimum assistance;Assist X1 (patient scissoring, narrow base of support, decreased generate solutions  Insights: Decreased awareness of deficits  Sequencing: Requires cues for some  Orientation  Overall Orientation Status: Within Normal Limits                  Education Given To: Patient; Family  Education Provided: Role of Therapy;Transfer Training;Plan of Care;Equipment; Fall Prevention Strategies; ADL Adaptive Strategies  Education Method: Demonstration;Verbal  Education Outcome: Verbalized understanding;Continued education needed     Pt educated in use of compression stockings including wear schedule and techniques to doff/don, pt verbalized understanding. Pt educated in use of chlorhexidine surgical soap including importance of using and proper use, pt verbalized understanding. Alvarado hose compression stockings and chlorhexidine surgical soap provided to pt.           Hand Dominance  Hand Dominance: Right  Hand Assessment Comment  Hand Assessment Comment: B  strength good  Car Transfers  Car Transfers: reviewed safe technique with car transfers           AM-PAC Score        AM-[facility] Inpatient Daily Activity Raw Score: 16 (10/25/23 1534)  AM-PAC Inpatient ADL T-Scale Score : 35.96 (10/25/23 1534)  ADL Inpatient CMS 0-100% Score: 53.32 (10/25/23 1534)  ADL Inpatient CMS G-Code Modifier : CK (10/25/23 1534)        Goals  Short Term Goals  Time Frame for Short Term Goals: 14 visits  Short Term Goal 1: Pt will complete UB ADLs/grooming with MOD IND  Short Term Goal 2: Pt will complete LB ADLs/toileting with MOD IND  Short Term Goal 3: Pt will complete functional mobility/transfers with MOD IND in prep for ADL completion  Short Term Goal 4: Pt will increase standing balance to good for improved safety with standing aspects of ADL completion  Patient Goals   Patient goals : go home       Therapy Time   Individual Concurrent Group Co-treatment   Time In 1400         Time Out 1502         Minutes 62         Timed Code Treatment Minutes: [address] [facility], OTR/L

## 2023-10-25 NOTE — ANESTHESIA PROCEDURE NOTES
Spinal Block    End time: 10/25/2023 8:59 AM  Reason for block: primary anesthetic  Staffing  Performed: resident/CRNA   Anesthesiologist: Giuliana Ariza MD  Resident/CRNA: DOMO Burroughs CRNA  Performed by: DOMO Burroughs CRNA  Authorized by: Giuliana Ariza MD    Spinal Block  Patient position: sitting  Prep: Betadine  Patient monitoring: continuous pulse ox, oxygen and frequent blood pressure checks  Approach: midline  Location: L3/L4  Provider prep: mask and sterile gloves  Local infiltration: lidocaine  Needle  Needle type:  Jacoby   Needle gauge: 25 G  Needle length: 3.5 in  Assessment  Sensory level: T6  Swirl obtained: Yes  CSF: clear  Attempts: 2  Hemodynamics: stable  Additional Notes  Lot # [id number] medline exp:9/30/25  Preanesthetic Checklist  Completed: patient identified, IV checked, site marked, risks and benefits discussed, surgical/procedural consents, equipment checked, pre-op evaluation, timeout performed, anesthesia consent given, oxygen available, monitors applied/VS acknowledged, fire risk safety assessment completed and verbalized and blood product R/B/A discussed and consented

## 2023-10-25 NOTE — PLAN OF CARE
Problem: Pain  Goal: Verbalizes/displays adequate comfort level or baseline comfort level  10/25/2023 1527 by Brii Huang RN  Outcome: Adequate for Discharge  10/25/2023 1220 by Brii Huang RN  Outcome: Progressing  Flowsheets (Taken 10/25/2023 1217)  Verbalizes/displays adequate comfort level or baseline comfort level:   Encourage patient to monitor pain and request assistance   Assess pain using appropriate pain scale     Problem: Discharge Planning  Goal: Discharge to home or other facility with appropriate resources  10/25/2023 1527 by Brii Huang RN  Outcome: Adequate for Discharge  Flowsheets (Taken 10/25/2023 1300)  Discharge to home or other facility with appropriate resources: Identify barriers to discharge with patient and caregiver  10/25/2023 1220 by Brii Huang RN  Outcome: Progressing     Problem: Safety - Adult  Goal: Free from fall injury  10/25/2023 1527 by Brii Huang RN  Outcome: Adequate for Discharge  10/25/2023 1220 by Brii Huang RN  Outcome: Progressing     Problem: ABCDS Injury Assessment  Goal: Absence of physical injury  Outcome: Adequate for Discharge

## 2023-10-25 NOTE — ANESTHESIA POSTPROCEDURE EVALUATION
Department of Anesthesiology  Postprocedure Note    Patient: Jaciel Gandhi  MRN: [id number]  YOB: 1945  Date of evaluation: 10/25/2023      Procedure Summary     Date: 10/25/23 Room / Location: [address]    Anesthesia Start: 0419 Anesthesia Stop: 1972    Procedure: RIGHT HIP ANTERIOR TOTAL ARTHROPLASTY WITH BIOMET AND PRE-OP CPNB (Right: Hip) Diagnosis:       Osteoarthritis of right hip, unspecified osteoarthritis type      (Osteoarthritis of right hip, unspecified osteoarthritis type [M16.11])    Surgeons: Janet Aguiar MD Responsible Provider: Isidra Louise MD    Anesthesia Type: spinal, MAC ASA Status: 3          Anesthesia Type: No value filed.     Jase Phase I: Jase Score: 10    Jase Phase II:        Anesthesia Post Evaluation    Patient location during evaluation: PACU  Patient participation: complete - patient participated  Level of consciousness: awake and alert  Airway patency: patent  Nausea & Vomiting: no nausea and no vomiting  Complications: no  Cardiovascular status: blood pressure returned to baseline  Respiratory status: acceptable and room air  Hydration status: euvolemic  Pain management: adequate and satisfactory to patient

## 2023-10-25 NOTE — PROGRESS NOTES
Orthopedic Coordinator Note    Patient s/p right total Hip replacement on 10/25/23    The following appointments are currently scheduled:    Post-op with surgeon 11/9/23 at [phone]    Physical Therapy: Home exercise      Wheeled walker order is not entered  Face to face documentation is n/a. Patient has a FWW. DVT Prophylaxis: ASA 81 mg PO BID until 10/27/23.  On 10/27/23 Resume Eliquis 5 mg PO BID      Electronically signed by: Blayne Jones RN on 10/25/2023 at 12:34 PM

## 2023-10-25 NOTE — ANESTHESIA PRE PROCEDURE
\"PREGSERUM\", \"HCG\", \"HCGQUANT\"     ABGs: No results found for: \"PHART\", \"PO2ART\", \"GPL5QMK\", \"TQZ1UXW\", \"BEART\", \"M7KWGDOU\"     Type & Screen (If Applicable):  No results found for: \"LABABO\", \"LABRH\"    Drug/Infectious Status (If Applicable):  No results found for: \"HIV\", \"HEPCAB\"    COVID-19 Screening (If Applicable): No results found for: \"COVID19\"        Anesthesia Evaluation  Patient summary reviewed and Nursing notes reviewed no history of anesthetic complications:   Airway: Mallampati: II  TM distance: >3 FB   Neck ROM: full  Mouth opening: > = 3 FB   Dental: normal exam         Pulmonary:Negative Pulmonary ROS and normal exam  breath sounds clear to auscultation                             Cardiovascular:  Exercise tolerance: no interval change,   (+) hypertension: no interval change, CAD: no interval change, CABG/stent: no interval change, hyperlipidemia      ECG reviewed  Rhythm: regular  Rate: normal           Beta Blocker:  Dose within 24 Hrs         Neuro/Psych:   Negative Neuro/Psych ROS               ROS comment: Essential tremor GI/Hepatic/Renal: Neg GI/Hepatic/Renal ROS            Endo/Other:    (+) hypothyroidism: arthritis: OA., .                  ROS comment: Scoliosis Abdominal: normal exam      Abdomen: soft. Vascular: negative vascular ROS. Other Findings:           Anesthesia Plan      spinal and MAC     ASA 3     (Spinal  Cardiac cleared by cardiologist for the surgery)        Anesthetic plan and risks discussed with patient. Plan discussed with CRNA.                     Viviane Pretty MD   10/25/2023

## 2023-10-25 NOTE — PLAN OF CARE
Problem: Pain  Goal: Verbalizes/displays adequate comfort level or baseline comfort level  Outcome: Progressing  Flowsheets (Taken 10/25/2023 1217)  Verbalizes/displays adequate comfort level or baseline comfort level:   Encourage patient to monitor pain and request assistance   Assess pain using appropriate pain scale     Problem: Discharge Planning  Goal: Discharge to home or other facility with appropriate resources  Outcome: Progressing     Problem: Safety - Adult  Goal: Free from fall injury  Outcome: Progressing There are no Wet Read(s) to document.

## 2023-10-25 NOTE — OP NOTE
Operative Note      Patient: Katia Warren  YOB: 1945  MRN: [id number]    Date of Procedure: 10/25/2023    Pre-Op Diagnosis Codes:     * Osteoarthritis of right hip, unspecified osteoarthritis type [M16.11]    Post-Op Diagnosis: Same       Procedure(s):  RIGHT HIP ANTERIOR TOTAL ARTHROPLASTY WITH BIOMET AND PRE-OP CPNB    Surgeon(s):  Ramses Leonardo MD    Assistant:   First Assistant: Rony Brito RN    Anesthesia: Spinal    Estimated Blood Loss (mL): Minimal    Complications: None    Specimens:   * No specimens in log *    Implants:  Implant Name Type Inv. Item Serial No.  Lot No. LRB No. Used Action   LINER ACET NEUT G 40 MM VIVACIT-E G7 - [id number]  LINER ACET NEUT G 40 MM VIVACIT-E G7  [employer]- [id number] Right 1 Implanted   IMPL HIP LINER ACET G7 3HL 58MM - [id number] Hip IMPL HIP LINER ACET G7 3HL 58MM  JOANIE [facility] [id number] Right 1 Implanted   STEM FEM SZ 14 L113MM NK L39.6MM 133DEG HI OFFSET HIP TI - [id number]  STEM FEM SZ 14 L113MM NK L39.6MM 133DEG HI OFFSET HIP TI  [employer]- [id number] Right 1 Implanted   HEAD FEM [id number] HIP BIOLOX DELT OPT FOR G7 ACET SYS - [id number]  HEAD FEM [id number] HIP BIOLOX DELT OPT FOR G7 ACET SYS  [employer]- [id number] Right 1 Implanted   SLEEVE FEM -3MM OFFSET HIP TYP 1 TAPR FOR CERAMIC BIOLOX - [id number]  SLEEVE FEM -3MM OFFSET HIP TYP 1 TAPR FOR CERAMIC BIOLOX  [employer]-WD [id number] Right 1 Implanted         Drains: * No LDAs found *    Findings: see below        Detailed Description of Procedure:   Informed consent was obtained. I marked his right hip. He was brought back to the operating room where a spinal anesthetic was performed and monitored sedation was begun. He was then positioned on the operative table. The right leg was prepped and draped in normal sterile fashion. A timeout was performed. He did receive prophylactic antibiotics.   I made an incision on the

## 2023-10-25 NOTE — DISCHARGE INSTRUCTIONS
Leave the surgical bandage on for 5 days. You may shower with the bandage on during the first 5 days. After the bandage is removed, you may shower with nothing over the incision. Gently dry the incision with a clean towel when done in the shower. At that time you do not need to keep the incision covered, however you may cover the incision with a gauze bandage if you would like. Do not submerge the incision in water. Elevate the leg as much as possible. Please get up and try to walk a short distance once an hour. You can walk as much as you can tolerate. Avoid any extremes of motion or deep hip flexion. Wear your compression socks during the day and take them off at night. ANY ORTHOPEDIC QUESTIONS OR OTHER CONCERNS YOU MAY CALL THE ORTHOPEDIC COORDINATOR:   Ro BARRERA RN  [phone]  [email or web address]

## 2023-10-25 NOTE — PROGRESS NOTES
Physical Therapy  Facility/Department: [facility]  Physical Therapy Initial Assessment    Name: Rey Cordero  : 1945  MRN: [id number]  Date of Service: 10/25/2023  Chief Complaint: (R) hip OA, (R) hip CRISTOFER. Discharge Recommendations:  Home with assist PRN   PT Equipment Recommendations  Equipment Needed: No  Other: Pt has a RW at home. Patient Diagnosis(es): The encounter diagnosis was Post-op pain. Past Medical History:  has a past medical history of Arthritis, Atrial fibrillation ([address]), CAD (coronary artery disease), Essential tremor, Glaucoma, Hyperlipidemia, Hypertension, Scoliosis, Thyroid disease, and Under care of team.  Past Surgical History:  has a past surgical history that includes Coronary artery bypass graft (2022); Cardiac surgery; joint replacement (Left); Tonsillectomy; Coronary angioplasty with stent (2022); Cholecystectomy; Colonoscopy; and Total hip arthroplasty (Right, 10/25/2023). Assessment   Body Structures, Functions, Activity Limitations Requiring Skilled Therapeutic Intervention: Decreased functional mobility ; Decreased endurance;Decreased strength;Decreased ROM; Decreased safe awareness;Decreased balance  Assessment: Pt presents with decreased (R) LE ROM and strength s/p (R) CRISTOFER. Pt required Min-Mod assist for transfers and ambulated 10' and 15' with RW and Min-Mod assist x 2. Pt had residual weakness in (R) LE and lack of control, causing gait and balance deficits and one LOB requiring Max assist x 2 to recover. Pending expected progress, pt should be able to return to previous living arrangement with assistance as needed. Pt plans to continue with exercises and activity on his own after discharge.   Treatment Diagnosis: decreased mobility  Therapy Prognosis: Good  Requires PT Follow-Up: Yes  Activity Tolerance  Activity Tolerance: Patient tolerated evaluation without incident  Activity Tolerance Comments: Pt limited with mobility due to (R) Yes  Mode of Transportation: Car  Occupation: Retired  Leisure & Hobbies: gardening, outdoors  [address]: Impaired  Vision Exceptions: Wears glasses at all times  Hearing  Hearing: Within functional limits    Cognition   Orientation  Overall Orientation Status: Within Normal Limits  Cognition  Overall Cognitive Status: Exceptions     Objective           Gross Assessment  AROM: Within functional limits  PROM: Within functional limits  Strength: Generally decreased, functional  Coordination: Within functional limits  Tone: Normal  Sensation: Intact     AROM RLE (degrees)  RLE AROM: Exceptions  RLE General AROM: hip AROM limited due to weakness post surgery  AROM LLE (degrees)  LLE AROM : WFL  Strength RLE  Strength RLE: Exception  R Hip Flexion: 3-/5  R Knee Flexion: 3/5  R Knee Extension: 3-/5  Strength LLE  Strength LLE: WFL          Bed mobility  Supine to Sit: Stand by assistance  Scooting: Stand by assistance  Bed Mobility Comments: Head of bed flattened without use of bedrail. Pt retired to chair at end of session. Transfers  Sit to Stand: Minimal Assistance; Moderate Assistance  Stand to Sit: Minimal Assistance; Moderate Assistance  Stand Pivot Transfers: Minimal Assistance; Moderate Assistance  Comment: Transfers with RW. Verbal cues for hand placement with sit <-> stand. Pt with increased time and effort with sit > stand with decreased thrust to stand and increased time to bring trunk to upright. Pt had one LOB with RW when turning to sit due to small base of support and decreased (R) LE control; LOB required Max assist x 2 to regain balance and perform controlled descent into the chair. Ambulation  Surface: Level tile  Device: Rolling Walker  Assistance: Minimal assistance; Moderate assistance;2 Person assistance  Quality of Gait: slow pace with very narrow base of support, decreased step length and height (B) LE with decreased control of placement of (R) LE, forward flexed trunk  Gait

## 2023-10-26 VITALS
TEMPERATURE: 98.1 F | BODY MASS INDEX: 25.92 KG/M2 | OXYGEN SATURATION: 99 % | RESPIRATION RATE: 18 BRPM | WEIGHT: 201.94 LBS | DIASTOLIC BLOOD PRESSURE: 64 MMHG | HEART RATE: 87 BPM | HEIGHT: 74 IN | SYSTOLIC BLOOD PRESSURE: 102 MMHG

## 2023-10-26 PROCEDURE — 2580000003 HC RX 258: Performed by: ORTHOPAEDIC SURGERY

## 2023-10-26 PROCEDURE — 6370000000 HC RX 637 (ALT 250 FOR IP): Performed by: ORTHOPAEDIC SURGERY

## 2023-10-26 PROCEDURE — 97535 SELF CARE MNGMENT TRAINING: CPT

## 2023-10-26 PROCEDURE — 2580000003 HC RX 258: Performed by: STUDENT IN AN ORGANIZED HEALTH CARE EDUCATION/TRAINING PROGRAM

## 2023-10-26 PROCEDURE — 97116 GAIT TRAINING THERAPY: CPT

## 2023-10-26 PROCEDURE — 97530 THERAPEUTIC ACTIVITIES: CPT

## 2023-10-26 PROCEDURE — 99223 1ST HOSP IP/OBS HIGH 75: CPT | Performed by: STUDENT IN AN ORGANIZED HEALTH CARE EDUCATION/TRAINING PROGRAM

## 2023-10-26 PROCEDURE — 6360000002 HC RX W HCPCS: Performed by: ORTHOPAEDIC SURGERY

## 2023-10-26 PROCEDURE — 97110 THERAPEUTIC EXERCISES: CPT

## 2023-10-26 RX ADMIN — LEVOTHYROXINE SODIUM 200 MCG: 0.2 TABLET ORAL at 11:23

## 2023-10-26 RX ADMIN — CEFAZOLIN 2000 MG: 2 INJECTION, POWDER, FOR SOLUTION INTRAMUSCULAR; INTRAVENOUS at 00:53

## 2023-10-26 RX ADMIN — SENNOSIDES AND DOCUSATE SODIUM 1 TABLET: 50; 8.6 TABLET ORAL at 08:35

## 2023-10-26 RX ADMIN — SODIUM CHLORIDE 250 ML: 9 INJECTION, SOLUTION INTRAVENOUS at 00:50

## 2023-10-26 RX ADMIN — TAMSULOSIN HYDROCHLORIDE 0.4 MG: 0.4 CAPSULE ORAL at 08:36

## 2023-10-26 RX ADMIN — ACETAMINOPHEN 650 MG: 325 TABLET ORAL at 08:36

## 2023-10-26 RX ADMIN — SODIUM CHLORIDE, PRESERVATIVE FREE 10 ML: 5 INJECTION INTRAVENOUS at 08:37

## 2023-10-26 RX ADMIN — ASPIRIN 81 MG: 81 TABLET, COATED ORAL at 08:35

## 2023-10-26 ASSESSMENT — ENCOUNTER SYMPTOMS
SORE THROAT: 0
CHEST TIGHTNESS: 0
RHINORRHEA: 0
CONSTIPATION: 0
DIARRHEA: 0
NAUSEA: 0
VOMITING: 0
SHORTNESS OF BREATH: 0

## 2023-10-26 ASSESSMENT — PAIN SCALES - GENERAL
PAINLEVEL_OUTOF10: 0
PAINLEVEL_OUTOF10: 0

## 2023-10-26 NOTE — PLAN OF CARE
Problem: Pain  Goal: Verbalizes/displays adequate comfort level or baseline comfort level  10/25/2023 2325 by Jami Dent RN  Outcome: Progressing  10/25/2023 1527 by Jaxson Vargas RN  Outcome: Adequate for Discharge  10/25/2023 1220 by Jaxson Vargas RN  Outcome: Progressing  Flowsheets (Taken 10/25/2023 1217)  Verbalizes/displays adequate comfort level or baseline comfort level:   Encourage patient to monitor pain and request assistance   Assess pain using appropriate pain scale     Problem: Discharge Planning  Goal: Discharge to home or other facility with appropriate resources  10/25/2023 2325 by Jami Dent RN  Outcome: Progressing  Flowsheets (Taken 10/25/2023 2212)  Discharge to home or other facility with appropriate resources:   Identify barriers to discharge with patient and caregiver   Arrange for needed discharge resources and transportation as appropriate   Identify discharge learning needs (meds, wound care, etc)  10/25/2023 1527 by Jaxson Vargas RN  Outcome: Adequate for Discharge  Flowsheets (Taken 10/25/2023 1300)  Discharge to home or other facility with appropriate resources: Identify barriers to discharge with patient and caregiver  10/25/2023 1220 by Jaxson Vargas RN  Outcome: Progressing     Problem: Safety - Adult  Goal: Free from fall injury  10/25/2023 2325 by Jami Dent RN  Outcome: Progressing  10/25/2023 1527 by Jaxson Vargas RN  Outcome: Adequate for Discharge  10/25/2023 1220 by Jaxson Vargas RN  Outcome: Progressing     Problem: ABCDS Injury Assessment  Goal: Absence of physical injury  10/25/2023 2325 by Jami Dent RN  Outcome: Progressing  10/25/2023 1527 by Jaxson Vargas RN  Outcome: Adequate for Discharge

## 2023-10-26 NOTE — PROGRESS NOTES
Occupational Therapy  Facility/Department: [facility] PROGRESSIVE CARE  Occupational Therapy Daily Treatment Note    Name: Radha Savage  : 1945  MRN: [id number]  Date of Service: 10/26/2023    Discharge Recommendations:     OT Equipment Recommendations  Other: continue to monitor       Patient Diagnosis(es): The encounter diagnosis was Post-op pain. Past Medical History:  has a past medical history of Arthritis, Atrial fibrillation ([address]), CAD (coronary artery disease), Essential tremor, Glaucoma, Hyperlipidemia, Hypertension, Scoliosis, Thyroid disease, and Under care of team.  Past Surgical History:  has a past surgical history that includes Coronary artery bypass graft (2022); Cardiac surgery; joint replacement (Left); Tonsillectomy; Coronary angioplasty with stent (2022); Cholecystectomy; Colonoscopy; and Total hip arthroplasty (Right, 10/25/2023). Assessment   Performance deficits / Impairments: Decreased ADL status; Decreased functional mobility ; Decreased safe awareness;Decreased balance  Assessment: Patient progressing towards STGs. Patient continues to demonstrate decreased ADLs, balance and functional mobility following elective R CRISTOFER. Patient would benefit from skilled OT addressing above deficits while here at hospital and following discharge to maximize independence and safety. Would recommend 24 hr care at discharge initially.   Prognosis: Good  REQUIRES OT FOLLOW-UP: Yes  Activity Tolerance  Activity Tolerance: Patient Tolerated treatment well        Plan   Occupational Therapy Plan  Times Per Week: 6-7x/wk  Times Per Day:  (1-2x/day)  Current Treatment Recommendations: Balance training, Functional mobility training, Endurance training, Gait training, Patient/Caregiver education & training, Equipment evaluation, education, & procurement, Safety education & training, Self-Care / ADL     Restrictions  Restrictions/Precautions  Restrictions/Precautions: Fall Risk, Surgical

## 2023-10-26 NOTE — PROGRESS NOTES
Endurance training, Patient/Caregiver education & training, Safety education & training, Therapeutic activities, Home exercise program  Safety Devices  Type of Devices: Call light within reach, Left in chair, All fall risk precautions in place, Gait belt  Restraints  Restraints Initially in Place: No     Restrictions  Restrictions/Precautions  Restrictions/Precautions: Fall Risk, Surgical Protocols  Implants present? : Metal implants  Position Activity Restriction  Other position/activity restrictions: POD # 0 (R) anterior CRISTOFER, Dr. Graland Casper; WBAT.      Subjective   Pain: 5/10 in R hip after exercises and ambulation- ice pack provided and patient repositioned     Social/Functional History  Social/Functional History  Lives With: Spouse  Type of Home: House  Home Layout: Two level, Performs ADL's on one level, Laundry in basement (flight from basement B rails)  Home Access: Stairs to enter with rails  Entrance Stairs - Number of Steps: 1  Entrance Stairs - Rails: Both  Bathroom Shower/Tub: Walk-in shower  Bathroom Toilet: Handicap height  Bathroom Equipment: Grab bars in shower, Shower chair  Home Equipment: Lu Fay, rolling, Cane  Has the patient had two or more falls in the past year or any fall with injury in the past year?: No  ADL Assistance: [address]  (Independent with cleaning/washing dishes, wife cooks and does laundry)  Ambulation Assistance: Independent  Transfer Assistance: Independent  Active : Yes  Mode of Transportation: Car  Occupation: Retired  Leisure & Hobbies: gardening, outdoors    Cognition   Orientation  Overall Orientation Status: Within Normal Limits  Orientation Level: Oriented X4  Cognition  Overall Cognitive Status: WNL     Objective   Bed mobility  Bed Mobility Comments: patient in chair before and after treatment  Transfers  Sit to Stand: Stand by assistance  Stand to Sit: Stand by assistance  Comment: transfer with RW - no LOB, verbal cueing for UE use and

## 2023-10-26 NOTE — CARE COORDINATION
Case Management Assessment  Initial Evaluation    Date/Time of Evaluation: 10/26/2023 9:12 AM  Assessment Completed by: Marcos Malik    If patient is discharged prior to next notation, then this note serves as note for discharge by case management. Patient Name: Jaciel Gandhi                   YOB: 1945  Diagnosis: Osteoarthritis of right hip, unspecified osteoarthritis type [M16.11]                   Date / Time: 10/25/2023  6:56 AM    Patient Admission Status: Outpatient   Readmission Risk (Low < 19, Mod (19-27), High > 27): No data recorded  Current PCP: Chelsea Epperson MD  PCP verified by CM? Yes    Chart Reviewed: Yes      History Provided by: Patient  Patient Orientation: Alert and Oriented    Patient Cognition: Alert    Hospitalization in the last 30 days (Readmission):  No    If yes, Readmission Assessment in  Navigator will be completed. Advance Directives:      Code Status: Full Code   Patient's Primary Decision Maker is: Legal Next of Kin (wife)      Discharge Planning:    Patient lives with: Spouse/Significant Other Type of Home: House  Primary Care Giver: Self  Patient Support Systems include: Spouse/Significant Other   Current Financial resources: Medicare  Current community resources: None  Current services prior to admission: Durable Medical Equipment            Current DME: Walker            Type of Home Care services:  None    ADLS  Prior functional level: Independent in ADLs/IADLs  Current functional level: Independent in ADLs/IADLs    PT AM-PAC: 14 /24  OT AM-PAC: 16 /24    Family can provide assistance at DC: Yes  Would you like Case Management to discuss the discharge plan with any other family members/significant others, and if so, who?  No  Plans to Return to Present Housing: Yes  Other Identified Issues/Barriers to RETURNING to current housing: none  Potential Assistance needed at discharge: N/A            Potential DME:  no  Patient expects to discharge to: House  Plan for transportation at discharge: Family (wife)    Financial    Payor: Kamila Breath / Plan: Diandra Moreno / Product Type: *No Product type* /     Does insurance require precert for SNF: Yes    Potential assistance Purchasing Medications: No  Meds-to-Beds request: Yes    No Pharmacies Listed    Notes:    Factors facilitating achievement of predicted outcomes: Family support, Friend support, Motivated, Cooperative, and Pleasant    Barriers to discharge: Decreased endurance    Additional Case Management Notes: Patient plans to go home with wife. Denies any needs. The Plan for Transition of Care is related to the following treatment goals of Osteoarthritis of right hip, unspecified osteoarthritis type [W88.56]    IF APPLICABLE: The Patient and/or patient representative Jarred Tapia and his family were provided with a choice of provider and agrees with the discharge plan.  Freedom of choice list with basic dialogue that supports the patient's individualized plan of care/goals and shares the quality data associated with the providers was provided to:     Patient Representative Name:       The Patient and/or Patient Representative Agree with the Discharge Plan? yes     Gabrielle Nurse  Case Management Department

## 2023-10-26 NOTE — CONSULTS
[address] [facility],[address] / HISTORY AND PHYSICAL EXAMINATION            Date:   10/26/2023  Patient name:  Elizabeth Stark  Date of admission:  10/25/2023  6:56 AM  MRN:   [id number]  Account:  [de-identified]  YOB: 1945  PCP:    Jose James MD  Room:   [address] [place] [address]  Code Status:    Full Code    Physician Requesting Consult: Jose James MD    Reason for Consult: Medical management of hypertension    History Obtained From:     patient, spouse, nursing staff    History of Present Illness:     Elizabeth Stark is a 77 y.o. Non- / non  male who presents with No chief complaint on file. and is admitted to the hospital for the management of Osteoarthritis of right hip, unspecified osteoarthritis type. Patient is postop day 1 from right hip replacement. He was having some increased weakness in the legs yesterday due to the spinal anesthesia used which required him to stay an extra day in the hospital.  During his stay I was asked to see him for medical management of hypertension. Patient also has a history of A-fib. He is usually taking Eliquis which was held for the surgery and per Ortho's protocol will restart was on 1020 7 in the AM.   His blood pressure is controlled with metoprolol succinate 50 mg daily. This is also rate control for his A-fib. He is on no other blood pressure medicines. Overnight his blood pressure was running low around 90/50. His metoprolol was held last night. Telemetry was reviewed overnight and patient's heart rate was in the 60s to 70s range. He also remained in sinus rhythm throughout the hospital stay according to telemetry. He did have some PVCs noted. This morning patient tells me he is feeling much better than he did yesterday.   He can move his legs much better and is working with PT he was able to walk down the [place] and walk upstairs with his walker under the guidance night and is now at 110/64 and patient is asymptomatic. Patient remained asymptomatic from a cardiac standpoint during his whole stay here. His blood pressure is normally in the 020N to 650D systolic range as an outpatient. Okay for discharge after 3 PM if blood pressure improves some more during the day. If blood pressure continues to drop or stay low will need to reevaluate discharge plans. Patient and wife are agreeable to the plan. Patient will follow-up with PCP within 2 weeks for recheck of blood pressure and medical management. Patient was also advised to call his cardiologist, Dr. Christal Platt today to advise that he has now stopped the metoprolol and to update on the events of the hospital stay and request a hospital follow-up appointment within 2 weeks. I discussed in detail with patient and wife the plan to monitor heart rate at home to check it at least twice a day and if it is above 100 BPM regularly that they can call the on-call service to discuss what to do next most likely would involve starting the metoprolol again. Patient has a Fitbit and he was also educated how to check his heart rate manually. If the blood pressure is low below 100/90 they were also instructed to call the office. If patient becomes symptomatic in any way they were instructed to call the office or go to the ER and wife and patient are agreeable to the plan. They understand the plan. He understands to start Eliquis on 10/27/23 in the AM dose per Ortho protocol. Patient is admitted as observation status. Expected length of stay < 48 hours.  Patient is admitted in the Med/Surge    Medical Decision Making: Ayah Rust MD  10/26/2023  10:31 AM    Copy sent to Dr. Forrest Garcia MD

## 2023-11-01 ENCOUNTER — TELEPHONE (OUTPATIENT)
Age: 78
End: 2023-11-01

## 2023-11-01 ENCOUNTER — HOSPITAL ENCOUNTER (OUTPATIENT)
Dept: VASCULAR LAB | Age: 78
Discharge: HOME | End: 2023-11-03
Attending: ORTHOPAEDIC SURGERY
Payer: MEDICARE

## 2023-11-01 DIAGNOSIS — M16.11 OSTEOARTHRITIS OF RIGHT HIP, UNSPECIFIED OSTEOARTHRITIS TYPE: ICD-10-CM

## 2023-11-01 DIAGNOSIS — M79.661 PAIN IN RIGHT LOWER LEG: ICD-10-CM

## 2023-11-01 DIAGNOSIS — M16.11 OSTEOARTHRITIS OF RIGHT HIP, UNSPECIFIED OSTEOARTHRITIS TYPE: Primary | ICD-10-CM

## 2023-11-01 PROCEDURE — 93971 EXTREMITY STUDY: CPT

## 2023-11-01 PROCEDURE — 93971 EXTREMITY STUDY: CPT | Performed by: STUDENT IN AN ORGANIZED HEALTH CARE EDUCATION/TRAINING PROGRAM

## 2023-11-03 NOTE — TELEPHONE ENCOUNTER
Patient had a total hip replacement with Dr. Gabrielle Jones 10-26 and called for a follow up appointment. He as an appointment with Gabrielle Jones on the 9th. His discharge paperwork says follow up with Dr. Nadege Munoz as needed. He was just here to see you 10-17. Do you want to see him for a follow up or just see him at his next appointment in January?

## 2023-11-09 ENCOUNTER — OFFICE VISIT (OUTPATIENT)
Age: 78
End: 2023-11-09

## 2023-11-09 VITALS — BODY MASS INDEX: 25.8 KG/M2 | WEIGHT: 201 LBS | HEIGHT: 74 IN

## 2023-11-09 DIAGNOSIS — Z96.649 STATUS POST HIP REPLACEMENT, UNSPECIFIED LATERALITY: ICD-10-CM

## 2023-11-09 DIAGNOSIS — M16.11 OSTEOARTHRITIS OF RIGHT HIP, UNSPECIFIED OSTEOARTHRITIS TYPE: Primary | ICD-10-CM

## 2023-11-09 PROCEDURE — 99024 POSTOP FOLLOW-UP VISIT: CPT | Performed by: ORTHOPAEDIC SURGERY

## 2023-11-09 NOTE — PROGRESS NOTES
[place] [address] AND SPORTS MEDICINE  [facility] [address] [facility] [address]  [place] [address]  Dept: [phone]  Dept Fax: [phone]    Chief Compliant:  Chief Complaint   Patient presents with    Post-Op Check     Right total hip        History of Present Illness: This is a pleasant 77 y.o. male who is now 2 weeks status post right anterior total hip replacement. He is ambulating well with a walker. He is on Eliquis for his heart. He did have an ultrasound postoperatively which was negative for DVT. He has a history of heart bypass surgery using veins from the right leg. Physical Exam: The right hip incision is well-healed. He ambulates well with a walker. He has good hip flexion strength. There is some swelling around the incision, no erythema. The right calf is soft and nontender, minor swelling to the right leg. Imaging: X-rays of the right hip 2 views show good alignment of the prosthesis. Assessment and Plan: This is a pleasant 77 y.o. male who is status post the above. I reassured him that his ultrasound was negative and no DVT. His edema in the right leg is partially due to surgery and also partially due to a prior venous surgery on the leg limiting the venous outflow. We discussed expectations and restrictions. I will see him back in 4 weeks.          Past History:    Current Outpatient Medications:     apixaban (ELIQUIS) 5 MG TABS tablet, Take 1 tablet by mouth 2 times daily, Disp: 60 tablet, Rfl: 0    aspirin (ASPIRIN 81) 81 MG EC tablet, Take 1 tablet by mouth 2 times daily for 2 days, Disp: 5 tablet, Rfl: 0    amoxicillin (AMOXIL) 500 MG tablet, Take 4 tablets by mouth once as needed For dental appointments, Disp: , Rfl:     calcium carbonate 1500 (600 Ca) MG TABS tablet, Take 1 tablet by mouth daily (with breakfast), Disp: , Rfl:     FERROUS SULFATE PO, Take 65 mg by

## 2023-11-27 RX ORDER — PRIMIDONE 50 MG/1
TABLET ORAL
Qty: 450 TABLET | Refills: 3 | Status: SHIPPED | OUTPATIENT
Start: 2023-11-27

## 2023-11-27 NOTE — TELEPHONE ENCOUNTER
Daniela Ramirez is calling to request a refill on the following medication(s):    Medication Request:  Requested Prescriptions     Pending Prescriptions Disp Refills    primidone (MYSOLINE) 50 MG tablet [Pharmacy Med Name: Primidone 50 MG Oral Tablet] 450 tablet 3     Sig: TAKE 2 TABLETS BY MOUTH TWICE  DAILY AND 1 TABLET BY MOUTH AT  BEDTIME AS DIRECTED       Last Visit Date (If Applicable):  Visit date not found    Next Visit Date:    1/12/2024

## 2023-12-07 ENCOUNTER — OFFICE VISIT (OUTPATIENT)
Age: 78
End: 2023-12-07

## 2023-12-07 DIAGNOSIS — Z96.649 STATUS POST HIP REPLACEMENT, UNSPECIFIED LATERALITY: Primary | ICD-10-CM

## 2023-12-07 NOTE — PROGRESS NOTES
[place] [address] AND SPORTS MEDICINE  [address] [facility] [address]  [place] [address]  Dept: [phone]  Dept Fax: [phone]    Chief Compliant:  Chief Complaint   Patient presents with    Post-Op Check     RTHA f/u        History of Present Illness: This is a pleasant 77 y.o. male who is now about 6-week status post right anterior total hip replacement. He states that overall he has improved significantly. Over the past week and especially over the past day he had an increase in pain that is migratory. He noted it in the thigh and then the groin and then the buttock. He denied any trauma or change to his activities. He is not taking any medication for the pain. Physical Exam: He is able to get up from a seated position and walk without his cane. He walks better with a cane. He has good hip range of motion. His incision is completely healed. Imagin views of the right hip taken today show unchanged alignment of the prosthesis, no evidence of loosening or fracture. Assessment and Plan: This is a pleasant 77 y.o. male who is status post the above. I reassured him that he is likely having muscle pain. His prosthetic joint is functioning well. I will see him back in 6 weeks.          Past History:    Current Outpatient Medications:     primidone (MYSOLINE) 50 MG tablet, TAKE 2 TABLETS BY MOUTH TWICE  DAILY AND 1 TABLET BY MOUTH AT  BEDTIME AS DIRECTED, Disp: 450 tablet, Rfl: 3    apixaban (ELIQUIS) 5 MG TABS tablet, Take 1 tablet by mouth 2 times daily, Disp: 60 tablet, Rfl: 0    aspirin (ASPIRIN 81) 81 MG EC tablet, Take 1 tablet by mouth 2 times daily for 2 days, Disp: 5 tablet, Rfl: 0    amoxicillin (AMOXIL) 500 MG tablet, Take 4 tablets by mouth once as needed For dental appointments, Disp: , Rfl:     calcium carbonate 1500 (600 Ca) MG TABS tablet, Take 1 tablet by mouth

## 2024-01-02 ENCOUNTER — HOSPITAL ENCOUNTER (OUTPATIENT)
Age: 79
Setting detail: THERAPIES SERIES
Discharge: HOME | End: 2024-01-02
Attending: ORTHOPAEDIC SURGERY
Payer: MEDICARE

## 2024-01-02 PROCEDURE — 97110 THERAPEUTIC EXERCISES: CPT

## 2024-01-02 NOTE — FLOWSHEET NOTE
[x] [facility]  Outpatient Rehabilitation & Therapy  [address], [place] [address]    Physical Therapy Daily Treatment Note      Date:  2024  Patient Name:  Prince Gutierrez    :  1945  MRN: [id number]  Physician: Howard Knott MD                               Insurance: [facility] Medicare-BMN  Medical Diagnosis: OA right hip (s/p Rt THR)                   Rehab Codes: M25.651, M25.551, R53.1, R26.2  Onset date: 10/25/23             Next Dr's appt.: 24  Visit# / total visits:   Cancels/No Shows: 0/0    Subjective:    Pain:  [x] Yes  [] No Location: Rt hip Pain Rating: (0-10 scale) 8/10  Pain altered Tx:  [] No  [] Yes  Action:  Comments: Gets most pain with \"WB\".  No real problems with the home exercises except (prone hip ext)    Past Medical History:   Diagnosis Date    Arthritis     Atrial fibrillation (HCC)     CAD (coronary artery disease) 02/2022    x3 stents    Essential tremor     Glaucoma     Hyperlipidemia     Hypertension     Scoliosis     Thyroid disease     Under care of team     cardiology- Dr. Mchugh     Past Surgical History:   Procedure Laterality Date    CARDIAC SURGERY      CHOLECYSTECTOMY      COLONOSCOPY      CORONARY ANGIOPLASTY WITH STENT PLACEMENT  02/2022    x3    CORONARY ARTERY BYPASS GRAFT  2022    JOINT REPLACEMENT Left     hip    TONSILLECTOMY      TOTAL HIP ARTHROPLASTY Right 10/25/2023    RIGHT HIP ANTERIOR TOTAL ARTHROPLASTY WITH BIOMET AND PRE-OP CPNB    TOTAL HIP ARTHROPLASTY Right 10/25/2023    RIGHT HIP ANTERIOR TOTAL ARTHROPLASTY WITH BIOMET AND PRE-OP CPNB performed by Howard Knott MD at [facility]       Objective:  Modalities:   Precautions: cardiac Hx  Exercises:  Exercise Reps/ Time Weight/ Level Comments             Hip abd 20x       clamshells 20x       Prone hip ext 20x       Prone hip IR/ER 20x                 Hooklying hip abd/add 20x       Supine hip flexor stretch 45\"       bridges 20x       PROM:

## 2024-01-04 ENCOUNTER — HOSPITAL ENCOUNTER (OUTPATIENT)
Age: 79
Setting detail: THERAPIES SERIES
Discharge: HOME | End: 2024-01-04
Attending: ORTHOPAEDIC SURGERY
Payer: MEDICARE

## 2024-01-04 PROCEDURE — 97110 THERAPEUTIC EXERCISES: CPT

## 2024-01-04 NOTE — FLOWSHEET NOTE
Risks/Benefits discussed  [] Home exercise program    Method of Education: [x] Verbal  [x] Demo  [x] Written  Comprehension of Education:  [x] Verbalizes understanding.  [] Demonstrates understanding.  [x] Needs Review.  [] Demonstrates/verbalizes understanding of HEP/Ed previously given.  1/4/24 added lateral step ups to on line HEP   Issued fall prevention handout today.    Access Code: [email or web address]  URL: [email or web address]  Date: 12/22/2023  Prepared by: Joey Palmer     Exercises  - Clamshell  - 1 x daily - 7 x weekly - 2 sets - 10 reps  - Sidelying Hip Abduction  - 1 x daily - 7 x weekly - 2 sets - 10 reps  - Prone Hip Extension  - 1 x daily - 7 x weekly - 2 sets - 10 reps  - Prone Hip Internal and External Rotation AROM  - 1-2 x daily - 7 x weekly - 2 sets - 10 reps  - Supine Bridge  - 1 x daily - 7 x weekly - 2 sets - 10 reps  - Modified Isidro Stretch  - 1-2 x daily - 7 x weekly - 1 sets - 60 seconds hold  - Bent Knee Fallouts  - 1-2 x daily - 7 x weekly - 2 sets - 10 reps     Access Code: [email or web address]  URL: [email or web address]  Date: 01/02/2024  Prepared by: Joey Palmer    Exercises  - Standing Hip Abduction with Counter Support  - 1-2 x daily - 7 x weekly - 2 sets - 10 reps  - Standing Hip Extension with Counter Support  - 1-2 x daily - 7 x weekly - 2 sets - 10 reps  - Standing Hip Flexion with Counter Support  - 1-2 x daily - 7 x weekly - 2 sets - 10 reps  - Standing March with Counter Support  - 1-2 x daily - 7 x weekly - 2 sets - 10 reps  - Forward Step Up with Counter Support  - 1-2 x daily - 7 x weekly - 2 sets - 10 reps      Plan: [x] Continue per plan of care.   [] Other:      Treatment Charges: Mins Units   []  Modalities     [x]  Ther Exercise 45 3   []  Manual Therapy     []  Ther Activities     []  Aquatics     []  Neuromuscular     [] Vasocompression     [] Gait Training     [] Dry needling        [] 1 or 2 muscles        [] 3 or more muscles     []  Other     Total Treatment time

## 2024-01-08 ENCOUNTER — TELEPHONE (OUTPATIENT)
Age: 79
End: 2024-01-08

## 2024-01-08 DIAGNOSIS — Z12.5 PROSTATE CANCER SCREENING: Primary | ICD-10-CM

## 2024-01-08 NOTE — TELEPHONE ENCOUNTER
Patient has lab orders, pt stated that he does not see a PSA and he would like an order for that. He will be going down the [place] for his labs.

## 2024-01-09 ENCOUNTER — HOSPITAL ENCOUNTER (OUTPATIENT)
Age: 79
Setting detail: THERAPIES SERIES
Discharge: HOME | End: 2024-01-09
Attending: ORTHOPAEDIC SURGERY
Payer: MEDICARE

## 2024-01-09 ENCOUNTER — APPOINTMENT (OUTPATIENT)
Age: 79
End: 2024-01-09
Attending: ORTHOPAEDIC SURGERY
Payer: MEDICARE

## 2024-01-09 PROCEDURE — 97110 THERAPEUTIC EXERCISES: CPT

## 2024-01-09 NOTE — FLOWSHEET NOTE
[x] [facility]  Outpatient Rehabilitation & Therapy  [address], [place] [address]    Physical Therapy Daily Treatment Note      Date:  2024  Patient Name:  Prince Gutierrez    :  1945  MRN: [id number]  Physician: Howard Knott MD                               Insurance: [facility] Medicare-BMN  Medical Diagnosis: OA right hip (s/p Rt THR)                   Rehab Codes: M25.651, M25.551, R53.1, R26.2  Onset date: 10/25/23             Next Dr's appt.: 24  Visit# / total visits:   Cancels/No Shows: 0/0    Subjective:    Pain:  [] Yes  [x] No Location: Rt hip Pain Rating: (0-10 scale) 0/10  Pain altered Tx:  [] No  [] Yes  Action:  Comments: Patient reports having continued thigh/groin pain that varies.  Has good and bad days.  Pain up to 8/10 at worst.  Denies leg giving out.  Just has pain with WB.      Past Medical History:   Diagnosis Date    Arthritis     Atrial fibrillation (HCC)     CAD (coronary artery disease) 02/2022    x3 stents    Essential tremor     Glaucoma     Hyperlipidemia     Hypertension     Scoliosis     Thyroid disease     Under care of team     cardiology- Dr. Mchugh     Past Surgical History:   Procedure Laterality Date    CARDIAC SURGERY      CHOLECYSTECTOMY      COLONOSCOPY      CORONARY ANGIOPLASTY WITH STENT PLACEMENT  02/2022    x3    CORONARY ARTERY BYPASS GRAFT  2022    JOINT REPLACEMENT Left     hip    TONSILLECTOMY      TOTAL HIP ARTHROPLASTY Right 10/25/2023    RIGHT HIP ANTERIOR TOTAL ARTHROPLASTY WITH BIOMET AND PRE-OP CPNB    TOTAL HIP ARTHROPLASTY Right 10/25/2023    RIGHT HIP ANTERIOR TOTAL ARTHROPLASTY WITH BIOMET AND PRE-OP CPNB performed by Howard Knott MD at [facility]       Objective: walked into clinic with st cane vs walker today.   Modalities:   Precautions: cardiac Hx  Exercises:exercises completed today in bold  Exercise Reps/ Time Weight/ Level Comments             Hip abd 25x       clamshells 20x  Red

## 2024-01-10 ENCOUNTER — APPOINTMENT (OUTPATIENT)
Age: 79
End: 2024-01-10
Attending: ORTHOPAEDIC SURGERY
Payer: MEDICARE

## 2024-01-11 ENCOUNTER — HOSPITAL ENCOUNTER (OUTPATIENT)
Age: 79
Setting detail: THERAPIES SERIES
Discharge: HOME | End: 2024-01-11
Attending: ORTHOPAEDIC SURGERY
Payer: MEDICARE

## 2024-01-11 PROCEDURE — 97110 THERAPEUTIC EXERCISES: CPT

## 2024-01-11 NOTE — FLOWSHEET NOTE
[x] [facility]  Outpatient Rehabilitation & Therapy  [address], [place] [address]    Physical Therapy Daily Treatment Note      Date:  2024  Patient Name:  Prince Gutierrez    :  1945  MRN: [id number]  Physician: Howard Knott MD                               Insurance: [facility] Medicare-BMN  Medical Diagnosis: OA right hip (s/p Rt THR)                   Rehab Codes: M25.651, M25.551, R53.1, R26.2  Onset date: 10/25/23             Next Dr's appt.: 24  Visit# / total visits:   Cancels/No Shows: 0/0    Subjective:    Pain:  [x] Yes  [] No Location: Rt hip/ knee Pain Rating: (0-10 scale) 0/10 (hip), 8/10 right knee  Pain altered Tx:  [] No  [x] Yes  Action: limited weight bearing acts to avoid increasing knee pain  Comments: States hip has not been bothering him today, although right knee is very painful (up to 8/10 with weight bearing)- this got pretty bad yesterday. See's  next week- plans to discuss knee pain    Past Medical History:   Diagnosis Date    Arthritis     Atrial fibrillation (HCC)     CAD (coronary artery disease) 02/2022    x3 stents    Essential tremor     Glaucoma     Hyperlipidemia     Hypertension     Scoliosis     Thyroid disease     Under care of team     cardiology- Dr. Mchugh     Past Surgical History:   Procedure Laterality Date    CARDIAC SURGERY      CHOLECYSTECTOMY      COLONOSCOPY      CORONARY ANGIOPLASTY WITH STENT PLACEMENT  02/2022    x3    CORONARY ARTERY BYPASS GRAFT  2022    JOINT REPLACEMENT Left     hip    TONSILLECTOMY      TOTAL HIP ARTHROPLASTY Right 10/25/2023    RIGHT HIP ANTERIOR TOTAL ARTHROPLASTY WITH BIOMET AND PRE-OP CPNB    TOTAL HIP ARTHROPLASTY Right 10/25/2023    RIGHT HIP ANTERIOR TOTAL ARTHROPLASTY WITH BIOMET AND PRE-OP CPNB performed by Howard Knott MD at [facility]       Objective: walked into clinic with roll walker due to high right knee pain levels  Modalities: [facility] trialed to right

## 2024-01-16 ENCOUNTER — HOSPITAL ENCOUNTER (OUTPATIENT)
Age: 79
Setting detail: SPECIMEN
Discharge: HOME | End: 2024-01-16

## 2024-01-16 ENCOUNTER — HOSPITAL ENCOUNTER (OUTPATIENT)
Age: 79
Setting detail: THERAPIES SERIES
Discharge: HOME | End: 2024-01-16
Attending: ORTHOPAEDIC SURGERY
Payer: MEDICARE

## 2024-01-16 LAB
ALBUMIN SERPL-MCNC: 4.4 G/DL (ref 3.5–5.2)
ALBUMIN/GLOB SERPL: 1.8 {RATIO} (ref 1–2.5)
ALP SERPL-CCNC: 88 U/L (ref 40–129)
ALT SERPL-CCNC: 21 U/L (ref 5–41)
ANION GAP SERPL CALCULATED.3IONS-SCNC: 10 MMOL/L (ref 9–17)
AST SERPL-CCNC: 27 U/L
BILIRUB SERPL-MCNC: 0.5 MG/DL (ref 0.3–1.2)
BUN SERPL-MCNC: 18 MG/DL (ref 8–23)
CALCIUM SERPL-MCNC: 8.6 MG/DL (ref 8.6–10.4)
CHLORIDE SERPL-SCNC: 103 MMOL/L (ref 98–107)
CHOLEST SERPL-MCNC: 119 MG/DL
CHOLESTEROL/HDL RATIO: 2.2
CO2 SERPL-SCNC: 28 MMOL/L (ref 20–31)
CREAT SERPL-MCNC: 0.9 MG/DL (ref 0.7–1.2)
EST. AVERAGE GLUCOSE BLD GHB EST-MCNC: 100 MG/DL
GFR SERPL CREATININE-BSD FRML MDRD: >60 ML/MIN/1.73M2
GLUCOSE SERPL-MCNC: 93 MG/DL (ref 70–99)
HBA1C MFR BLD: 5.1 % (ref 4–6)
HDLC SERPL-MCNC: 54 MG/DL
LDLC SERPL CALC-MCNC: 53 MG/DL (ref 0–130)
POTASSIUM SERPL-SCNC: 4.1 MMOL/L (ref 3.7–5.3)
PROT SERPL-MCNC: 6.8 G/DL (ref 6.4–8.3)
PSA SERPL-MCNC: 1.01 NG/ML
SODIUM SERPL-SCNC: 141 MMOL/L (ref 135–144)
T4 FREE SERPL-MCNC: 1.5 NG/DL (ref 0.9–1.7)
TRIGL SERPL-MCNC: 61 MG/DL
TSH SERPL DL<=0.05 MIU/L-ACNC: 1.06 UIU/ML (ref 0.3–5)

## 2024-01-16 PROCEDURE — 97110 THERAPEUTIC EXERCISES: CPT

## 2024-01-16 NOTE — FLOWSHEET NOTE
[x] [facility]  Outpatient Rehabilitation & Therapy  [address], [place] [address]    Physical Therapy Daily Treatment Note      Date:  2024  Patient Name:  Prince Gutierrez    :  1945  MRN: [id number]  Physician: Howard Knott MD                               Insurance: [facility] Medicare-BMN  Medical Diagnosis: OA right hip (s/p Rt THR)                   Rehab Codes: M25.651, M25.551, R53.1, R26.2  Onset date: 10/25/23             Next Dr's appt.: 24  Visit# / total visits:   Cancels/No Shows: 0/0    Subjective:    Pain:  [x] Yes  [] No Location: Rt hip Pain Rating: (0-10 scale) 0/10,   Pain altered Tx:  [x] No  [] Yes  Action:   Comments: c/o spasm/cramping in inner thigh, especially after sitting for a while.  Knee is feeling better.      Past Medical History:   Diagnosis Date    Arthritis     Atrial fibrillation (HCC)     CAD (coronary artery disease) 02/2022    x3 stents    Essential tremor     Glaucoma     Hyperlipidemia     Hypertension     Scoliosis     Thyroid disease     Under care of team     cardiology- Dr. Mchugh     Past Surgical History:   Procedure Laterality Date    CARDIAC SURGERY      CHOLECYSTECTOMY      COLONOSCOPY      CORONARY ANGIOPLASTY WITH STENT PLACEMENT  02/2022    x3    CORONARY ARTERY BYPASS GRAFT  2022    JOINT REPLACEMENT Left     hip    TONSILLECTOMY      TOTAL HIP ARTHROPLASTY Right 10/25/2023    RIGHT HIP ANTERIOR TOTAL ARTHROPLASTY WITH BIOMET AND PRE-OP CPNB    TOTAL HIP ARTHROPLASTY Right 10/25/2023    RIGHT HIP ANTERIOR TOTAL ARTHROPLASTY WITH BIOMET AND PRE-OP CPNB performed by Howard Knott MD at [facility]       Objective: walked into clinic with roll walker due to high right knee pain levels  Modalities: P trialed to right knee x10' to relieve pain (in hooklying supine)  Precautions: cardiac Hx  Exercises:exercises completed today in bold  Exercise Reps/ Time Weight/ Level Comments             Hip abd

## 2024-01-18 ENCOUNTER — HOSPITAL ENCOUNTER (OUTPATIENT)
Age: 79
Setting detail: THERAPIES SERIES
Discharge: HOME | End: 2024-01-18
Attending: ORTHOPAEDIC SURGERY
Payer: MEDICARE

## 2024-01-18 ENCOUNTER — OFFICE VISIT (OUTPATIENT)
Age: 79
End: 2024-01-18

## 2024-01-18 VITALS — BODY MASS INDEX: 25.8 KG/M2 | HEIGHT: 74 IN | WEIGHT: 201 LBS

## 2024-01-18 DIAGNOSIS — Z96.649 STATUS POST HIP REPLACEMENT, UNSPECIFIED LATERALITY: Primary | ICD-10-CM

## 2024-01-18 PROCEDURE — 97110 THERAPEUTIC EXERCISES: CPT

## 2024-01-18 NOTE — PROGRESS NOTES
[facility] Orthopedics & Sports Medicine      [facility] PHYSICIANS [facility]  [facility] ORTHOPAEDICS AND SPORTS MEDICINE  [address]  [place] [address]  Dept: [phone]  Dept Fax: [phone]    Chief Compliant:  Chief Complaint   Patient presents with    Follow-up     RTHA        History of Present Illness:  This is a pleasant 78 y.o. male who is 12 weeks post operatively from a right anterior total hip replacement. He states he continues to attend physical therapy twice a week and does daily home exercises but feels he is still not where he wants to be in terms of function and simply just trusting the leg. He continues to ambulate with a walker due to fear of falling and not trusting the leg. States he still gets muscle spasms in the right thigh that are intermittent and random which makes him fearful of falling. He states he no longer has groin pain since starting physical therapy. He had his left hip replaced 10 years ago and is realizing that this surgery is a much different recovery. He denies any radiculopathy. Denies any new injuries or falls. Denies any issues with his incision. Denies any low back pain.     Physical Exam: Skin intact. Previous surgical incision well approximated and healed with mature scar formation. No TTP about the hip. Negative log roll. No pain with passive range of motion of the hip. Able to perform lying straight leg raise and can actively flex the hip. Ambulates with slight trendelenburg gait that can be corrected with shorter strides.     Imaging: None obtained today.       Assessment and Plan:    This is a pleasant 78 y.o. male who is status post above. Overall, the patient is doing very well functionally 3 months post operatively. Reassured the patient and his wife that it is common for his recovery to be slower than his other hip replacement as he was 10 years younger at that time and not every surgery recovery is identical.

## 2024-01-22 RX ORDER — LEVOTHYROXINE SODIUM 0.2 MG/1
TABLET ORAL
Qty: 90 TABLET | Refills: 3 | Status: SHIPPED | OUTPATIENT
Start: 2024-01-22

## 2024-01-22 NOTE — TELEPHONE ENCOUNTER
Prince Gutierrez is calling to request a refill on the following medication(s):    Medication Request:  Requested Prescriptions     Pending Prescriptions Disp Refills    levothyroxine (SYNTHROID) 200 MCG tablet [Pharmacy Med Name: Levothyroxine Sodium 200 MCG Oral Tablet] 90 tablet 3     Sig: TAKE 1 TABLET BY MOUTH IN THE  MORNING ON AN EMPTY STOMACH  DAILY       Last Visit Date (If Applicable):  Visit date not found    Next Visit Date:    Visit date not found

## 2024-01-23 ENCOUNTER — HOSPITAL ENCOUNTER (OUTPATIENT)
Age: 79
Setting detail: THERAPIES SERIES
Discharge: HOME | End: 2024-01-23
Attending: ORTHOPAEDIC SURGERY
Payer: MEDICARE

## 2024-01-23 PROCEDURE — 97110 THERAPEUTIC EXERCISES: CPT

## 2024-01-23 NOTE — FLOWSHEET NOTE
[x] [facility]  Outpatient Rehabilitation & Therapy  [address], [place] [address]    Physical Therapy Daily Treatment Note      Date:  2024  Patient Name:  Prince Gutierrez    :  1945  MRN: [id number]  Physician: Howard Knott MD                               Insurance: [facility] Medicare-BMN  Medical Diagnosis: OA right hip (s/p Rt THR)                   Rehab Codes: M25.651, M25.551, R53.1, R26.2  Onset date: 10/25/23             Next Dr's appt.: 24  Visit# / total visits:   Cancels/No Shows: 0/0    Subjective:    Pain:  [x] Yes  [] No Location: Rt hip Pain Rating: (0-10 scale) 0/10,   Pain altered Tx:  [x] No  [] Yes  Action:  Comments: States he pulled something in his thigh when doing lateral step ups at home on Friday (4 days ago).  Was very sore at the time but felt better within a day or 2.  Has been wearing heel lift and think's it is helping.  Denied any adverse effects when asked how he has felt after the last few sessions.      Past Medical History:   Diagnosis Date    Arthritis     Atrial fibrillation (HCC)     CAD (coronary artery disease) 02/2022    x3 stents    Essential tremor     Glaucoma     Hyperlipidemia     Hypertension     Scoliosis     Thyroid disease     Under care of team     cardiology- Dr. Mchugh     Past Surgical History:   Procedure Laterality Date    CARDIAC SURGERY      CHOLECYSTECTOMY      COLONOSCOPY      CORONARY ANGIOPLASTY WITH STENT PLACEMENT  02/2022    x3    CORONARY ARTERY BYPASS GRAFT  2022    JOINT REPLACEMENT Left     hip    TONSILLECTOMY      TOTAL HIP ARTHROPLASTY Right 10/25/2023    RIGHT HIP ANTERIOR TOTAL ARTHROPLASTY WITH BIOMET AND PRE-OP CPNB    TOTAL HIP ARTHROPLASTY Right 10/25/2023    RIGHT HIP ANTERIOR TOTAL ARTHROPLASTY WITH BIOMET AND PRE-OP CPNB performed by Howard Knott MD at [facility]       Objective: walked into clinic with roll walker and antalgia  Modalities: P trialed to right

## 2024-01-24 ENCOUNTER — OFFICE VISIT (OUTPATIENT)
Age: 79
End: 2024-01-24

## 2024-01-24 VITALS
TEMPERATURE: 98.2 F | HEIGHT: 74 IN | WEIGHT: 207.2 LBS | DIASTOLIC BLOOD PRESSURE: 64 MMHG | BODY MASS INDEX: 26.59 KG/M2 | OXYGEN SATURATION: 95 % | SYSTOLIC BLOOD PRESSURE: 110 MMHG | HEART RATE: 99 BPM

## 2024-01-24 DIAGNOSIS — G25.0 ESSENTIAL TREMOR: ICD-10-CM

## 2024-01-24 DIAGNOSIS — N39.0 RECURRENT UTI: ICD-10-CM

## 2024-01-24 DIAGNOSIS — I87.2 VENOUS INSUFFICIENCY OF BOTH LOWER EXTREMITIES: ICD-10-CM

## 2024-01-24 DIAGNOSIS — E78.5 DYSLIPIDEMIA: Primary | ICD-10-CM

## 2024-01-24 DIAGNOSIS — E03.9 HYPOTHYROIDISM, UNSPECIFIED TYPE: ICD-10-CM

## 2024-01-24 DIAGNOSIS — Z12.5 PROSTATE CANCER SCREENING: ICD-10-CM

## 2024-01-24 DIAGNOSIS — R53.83 FATIGUE, UNSPECIFIED TYPE: ICD-10-CM

## 2024-01-24 DIAGNOSIS — I10 ESSENTIAL HYPERTENSION: ICD-10-CM

## 2024-01-24 DIAGNOSIS — R73.9 HYPERGLYCEMIA: ICD-10-CM

## 2024-01-24 DIAGNOSIS — Z96.641 HISTORY OF TOTAL RIGHT HIP REPLACEMENT: ICD-10-CM

## 2024-01-24 DIAGNOSIS — I25.10 CORONARY ARTERY DISEASE INVOLVING NATIVE CORONARY ARTERY OF NATIVE HEART WITHOUT ANGINA PECTORIS: ICD-10-CM

## 2024-01-24 DIAGNOSIS — Z23 ENCOUNTER FOR IMMUNIZATION: ICD-10-CM

## 2024-01-24 DIAGNOSIS — I48.0 PAROXYSMAL ATRIAL FIBRILLATION (HCC): ICD-10-CM

## 2024-01-24 RX ORDER — IBUPROFEN 200 MG
1 CAPSULE ORAL DAILY
COMMUNITY
Start: 2016-07-26

## 2024-01-24 RX ORDER — ALFUZOSIN HYDROCHLORIDE 10 MG/1
10 TABLET, EXTENDED RELEASE ORAL DAILY
COMMUNITY

## 2024-01-24 RX ORDER — ASPIRIN 81 MG/1
81 TABLET ORAL DAILY
COMMUNITY

## 2024-01-24 RX ORDER — PNV NO.95/FERROUS FUM/FOLIC AC 28MG-0.8MG
1 TABLET ORAL
COMMUNITY

## 2024-01-24 SDOH — ECONOMIC STABILITY: HOUSING INSECURITY
IN THE LAST 12 MONTHS, WAS THERE A TIME WHEN YOU DID NOT HAVE A STEADY PLACE TO SLEEP OR SLEPT IN A SHELTER (INCLUDING NOW)?: NO

## 2024-01-24 SDOH — ECONOMIC STABILITY: FOOD INSECURITY: WITHIN THE PAST 12 MONTHS, YOU WORRIED THAT YOUR FOOD WOULD RUN OUT BEFORE YOU GOT MONEY TO BUY MORE.: NEVER TRUE

## 2024-01-24 SDOH — ECONOMIC STABILITY: INCOME INSECURITY: HOW HARD IS IT FOR YOU TO PAY FOR THE VERY BASICS LIKE FOOD, HOUSING, MEDICAL CARE, AND HEATING?: NOT HARD AT ALL

## 2024-01-24 SDOH — ECONOMIC STABILITY: FOOD INSECURITY: WITHIN THE PAST 12 MONTHS, THE FOOD YOU BOUGHT JUST DIDN'T LAST AND YOU DIDN'T HAVE MONEY TO GET MORE.: NEVER TRUE

## 2024-01-24 ASSESSMENT — PATIENT HEALTH QUESTIONNAIRE - PHQ9
SUM OF ALL RESPONSES TO PHQ QUESTIONS 1-9: 0
2. FEELING DOWN, DEPRESSED OR HOPELESS: 0
SUM OF ALL RESPONSES TO PHQ9 QUESTIONS 1 & 2: 0
SUM OF ALL RESPONSES TO PHQ QUESTIONS 1-9: 0
1. LITTLE INTEREST OR PLEASURE IN DOING THINGS: 0

## 2024-01-24 ASSESSMENT — ENCOUNTER SYMPTOMS
COUGH: 0
SHORTNESS OF BREATH: 0
ABDOMINAL PAIN: 0

## 2024-01-24 NOTE — ASSESSMENT & PLAN NOTE
blood pressure well-controlled on metoprolol, dose was recently cut in half after his orthopedic surgery for lower blood pressure , continue to monitor at home,  reviewed recent kidney function normal on labs performed

## 2024-01-24 NOTE — PROGRESS NOTES
After obtaining consent, and per orders of Dr. Bruce, injection of Prevnar 20 given in left deltoid by Bettie Pineda MA. Patient tolerated the injection well.   
PSA 1.01, continue to follow with urology, check PSA, continue BPH med  10. Essential tremor  Assessment & Plan:    he had seen neurology to confirm diagnosed, continue primidone current dose  11. Coronary artery disease involving native coronary artery of native heart without angina pectoris  Assessment & Plan:    continue risk reduction with aspirin daily, blood pressure and lipid control, reviewed recent LDL 53, HDL 54 glucose 93, hemoglobin A1c 5.1  12. Encounter for immunization  -     Pneumococcal, PCV20, PREVNAR 20, (age 6w+), IM, PF  13. Paroxysmal atrial fibrillation (HCC)  Assessment & Plan:    rate controlled with metoprolol, continue Eliquis and follow with cardio       No orders of the defined types were placed in this encounter.       Immunizations Administered       Name Date Dose Route    Pneumococcal, PCV20, PREVNAR 20, (age 6w+), IM, 0.5mL 1/24/2024 0.5 mL Intramuscular    Site: Deltoid- Left    Lot: [id number]    NDC: 1430-7773-05             No follow-ups on file.      COMMUNICATION:     Disposition and Communication:     Electronically signed by Mulugeta Bruce MD on 1/24/2024 at 3:14 PM

## 2024-01-24 NOTE — ASSESSMENT & PLAN NOTE
continue risk reduction with aspirin daily, blood pressure and lipid control, reviewed recent LDL 53, HDL 54 glucose 93, hemoglobin A1c 5.1

## 2024-01-24 NOTE — ASSESSMENT & PLAN NOTE
requires compression stockings due to recent orthopedic surgery and underlying venous insufficiency.  Prescription for knee-high compression stockings given to patient ,20 to 30 mmHg , to wear daily

## 2024-01-24 NOTE — ASSESSMENT & PLAN NOTE
reviewed glucose 93 , hemoglobin A1c 5.1 which is coming down from slightly above normal in the past with walking and watching diet

## 2024-01-25 ENCOUNTER — HOSPITAL ENCOUNTER (OUTPATIENT)
Age: 79
Setting detail: THERAPIES SERIES
Discharge: HOME | End: 2024-01-25
Attending: ORTHOPAEDIC SURGERY
Payer: MEDICARE

## 2024-01-25 PROCEDURE — 97110 THERAPEUTIC EXERCISES: CPT

## 2024-01-25 NOTE — FLOWSHEET NOTE
[x] [facility]  Outpatient Rehabilitation & Therapy  [address], [place] [address]    Physical Therapy Daily Treatment Note      Date:  2024  Patient Name:  Prince Gutierrez    :  1945  MRN: [id number]  Physician: Howard Knott MD                               Insurance: [facility] Medicare-BMN  Medical Diagnosis: OA right hip (s/p Rt THR)                   Rehab Codes: M25.651, M25.551, R53.1, R26.2  Onset date: 10/25/23             Next Dr's appt.: 24  Visit# / total visits:   Cancels/No Shows: 0/0    Subjective:    Pain:  [x] Yes  [] No Location: Rt hip Pain Rating: (0-10 scale) 0/10,   Pain altered Tx:  [x] No  [] Yes  Action:  Comments:States he has had a pretty good past couple days. He hasn't had any issues with HEP.   Past Medical History:   Diagnosis Date    Arthritis     Atrial fibrillation (HCC)     BPH (benign prostatic hyperplasia)     CAD (coronary artery disease) 02/2022    x3 stents    Essential tremor     Glaucoma     Hyperlipidemia     Hypertension     Hypothyroid     Scoliosis     TIA (transient ischemic attack)     Under care of team     cardiology- Dr. Mchugh     Past Surgical History:   Procedure Laterality Date    CARDIOVERSION  2022    CHOLECYSTECTOMY      COLONOSCOPY  2015    luz marina, repeat 10yrs    CORONARY ANGIOPLASTY WITH STENT PLACEMENT  2022    Jeison, LAD 80-90%, 1st diag 50%, 2nd diag 60%, 1st obtuse 90%, RCA 60%, EF 55-60%    CORONARY ARTERY BYPASS GRAFT  02/2022    x3, Dr. Salas, [facility]    JOINT REPLACEMENT Left     hip    TONSILLECTOMY      TOTAL HIP ARTHROPLASTY Right 10/25/2023    RIGHT HIP ANTERIOR TOTAL ARTHROPLASTY WITH BIOMET AND PRE-OP CPNB    TOTAL HIP ARTHROPLASTY Right 10/25/2023    RIGHT HIP ANTERIOR TOTAL ARTHROPLASTY WITH BIOMET AND PRE-OP CPNB performed by Howard Knott MD at [facility]       Objective: walked into clinic with st cane and antalgia, but much better charles than last

## 2024-01-30 ENCOUNTER — HOSPITAL ENCOUNTER (OUTPATIENT)
Age: 79
Setting detail: THERAPIES SERIES
Discharge: HOME | End: 2024-01-30
Attending: ORTHOPAEDIC SURGERY
Payer: MEDICARE

## 2024-01-30 PROCEDURE — 97110 THERAPEUTIC EXERCISES: CPT

## 2024-01-30 NOTE — FLOWSHEET NOTE
Counter Support  - 1 x daily - 20 reps  - Standing March with Counter Support  - 1 x daily - 20 reps  - Forward Step Up with Counter Support  - 1 x daily - 20 reps  - Lateral Step Ups  - 1 x daily - 20 reps  - Seated Piriformis Stretch  - 1 x daily - 4 reps - 15 sec hold  - Hip Hiking on Step  - 1 x daily - 10-20 reps  - Alternating Step Forward with Support (Mirrored)  - 1 x daily - 10-20 reps  - Modified Isidro Stretch  - 1 x daily - 2 reps - 1' hold  - Seated Figure 4 Piriformis Stretch  - 1 x daily - 4 reps - 15 sec hold    Plan: [x] Continue per plan of care.   [] Other:      Treatment Charges: Mins Units   []  Modalities     [x]  Ther Exercise 45 3   []  Manual Therapy     []  Ther Activities     []  Aquatics     []  Neuromuscular     [] Vasocompression     [] Gait Training     [] Dry needling        [] 1 or 2 muscles        [] 3 or more muscles     []  Other     Total Treatment time 45 3     Time In: 2:17 pm           Time Out: 3:04 pm    Electronically signed by:  Joey Palmer, PT

## 2024-02-01 ENCOUNTER — HOSPITAL ENCOUNTER (OUTPATIENT)
Age: 79
Setting detail: THERAPIES SERIES
Discharge: HOME | End: 2024-02-01
Attending: ORTHOPAEDIC SURGERY
Payer: MEDICARE

## 2024-02-01 PROCEDURE — 97110 THERAPEUTIC EXERCISES: CPT

## 2024-02-01 NOTE — PROGRESS NOTES
Prone Hip Extension  - 1 x daily - 20 reps  - Prone Hip Internal and External Rotation AROM  - 1 x daily - 20 reps  - Supine Bridge  - 1 x daily - 20 reps  - Bent Knee Fallouts  - 1 x daily - 20 reps  - Standing Hip Abduction with Counter Support  - 1 x daily - 20 reps  - Standing Hip Extension with Counter Support  - 1 x daily - 20 reps  - Standing Hip Flexion with Counter Support  - 1 x daily - 20 reps  - Standing March with Counter Support  - 1 x daily - 20 reps  - Forward Step Up with Counter Support  - 1 x daily - 20 reps  - Lateral Step Ups  - 1 x daily - 20 reps  - Seated Piriformis Stretch  - 1 x daily - 4 reps - 15 sec hold  - Hip Hiking on Step  - 1 x daily - 10-20 reps  - Alternating Step Forward with Support (Mirrored)  - 1 x daily - 10-20 reps  - Modified Isidro Stretch  - 1 x daily - 2 reps - 1' hold  - Seated Figure 4 Piriformis Stretch  - 1 x daily - 4 reps - 15 sec hold    Plan: [x] Continue per plan of care.   [] Other:      Treatment Charges: Mins Units   []  Modalities     [x]  Ther Exercise 45 3   []  Manual Therapy     []  Ther Activities     []  Aquatics     []  Neuromuscular     [] Vasocompression     [] Gait Training     [] Dry needling        [] 1 or 2 muscles        [] 3 or more muscles     []  Other     Total Treatment time 45 3     Time In:  2:10  pm           Time Out: 3:00 pm    Electronically signed by:  Thea Dubon PTA

## 2024-02-06 ENCOUNTER — HOSPITAL ENCOUNTER (OUTPATIENT)
Age: 79
Setting detail: THERAPIES SERIES
Discharge: HOME | End: 2024-02-06
Attending: ORTHOPAEDIC SURGERY
Payer: MEDICARE

## 2024-02-06 PROCEDURE — 97110 THERAPEUTIC EXERCISES: CPT

## 2024-02-06 NOTE — FLOWSHEET NOTE
[x] [facility]  Outpatient Rehabilitation & Therapy  [address], [place] [address]    Physical Therapy Daily Treatment Note      Date:  2024  Patient Name:  Prince Gutierrez    :  1945  MRN: [id number]  Physician: Howard Knott MD                               Insurance: [facility] Medicare-BMN  Medical Diagnosis: OA right hip (s/p Rt THR)                   Rehab Codes: M25.651, M25.551, R53.1, R26.2  Onset date: 10/25/23             Next Dr's appt.: 24  Visit# / total visits:   Cancels/No Shows: 0/0  Next PN due at visit 20 per medicare guidelines    Subjective:    Pain:  [] Yes  [x] No Location: Rt hip Pain Rating: (0-10 scale) 0/10,   Pain altered Tx:  [x] No  [] Yes  Action:  Comments:   walked a mile yesterday-no issues.  Feels his hip is getting stronger and loosening up.  Doing exercises frequently.      Past Medical History:   Diagnosis Date    Arthritis     Atrial fibrillation (HCC)     BPH (benign prostatic hyperplasia)     CAD (coronary artery disease) 02/2022    x3 stents    Essential tremor     Glaucoma     Hyperlipidemia     Hypertension     Hypothyroid     Scoliosis     TIA (transient ischemic attack)     Under care of team     cardiology- Dr. Mchugh     Past Surgical History:   Procedure Laterality Date    CARDIOVERSION  2022    CHOLECYSTECTOMY      COLONOSCOPY  2015    luz marina, repeat 10yrs    CORONARY ANGIOPLASTY WITH STENT PLACEMENT  2022    Jeison, LAD 80-90%, 1st diag 50%, 2nd diag 60%, 1st obtuse 90%, RCA 60%, EF 55-60%    CORONARY ARTERY BYPASS GRAFT  02/2022    x3, Dr. Salsa, [facility]    JOINT REPLACEMENT Left     hip    TONSILLECTOMY      TOTAL HIP ARTHROPLASTY Right 10/25/2023    RIGHT HIP ANTERIOR TOTAL ARTHROPLASTY WITH BIOMET AND PRE-OP CPNB    TOTAL HIP ARTHROPLASTY Right 10/25/2023    RIGHT HIP ANTERIOR TOTAL ARTHROPLASTY WITH BIOMET AND PRE-OP CPNB performed by Howard Knott MD at [facility]       Objective: walked

## 2024-02-08 ENCOUNTER — APPOINTMENT (OUTPATIENT)
Age: 79
End: 2024-02-08
Attending: ORTHOPAEDIC SURGERY
Payer: MEDICARE

## 2024-02-23 PROBLEM — Z12.5 PROSTATE CANCER SCREENING: Status: RESOLVED | Noted: 2024-01-24 | Resolved: 2024-02-23

## 2024-02-29 ENCOUNTER — OFFICE VISIT (OUTPATIENT)
Age: 79
End: 2024-02-29

## 2024-02-29 VITALS — BODY MASS INDEX: 26.56 KG/M2 | WEIGHT: 207 LBS | HEIGHT: 74 IN

## 2024-02-29 DIAGNOSIS — Z96.649 STATUS POST HIP REPLACEMENT, UNSPECIFIED LATERALITY: Primary | ICD-10-CM

## 2024-02-29 PROCEDURE — 99024 POSTOP FOLLOW-UP VISIT: CPT | Performed by: ORTHOPAEDIC SURGERY

## 2024-02-29 NOTE — PROGRESS NOTES
[facility] Orthopedics & Sports Medicine      [facility] PHYSICIANS [facility]  MHP[facility] MURIEL [facility] ORTHOPAEDICS AND SPORTS MEDICINE  [address]  [place] [address]  Dept: [phone]  Dept Fax: [phone]    Chief Compliant:  Chief Complaint   Patient presents with    Follow-up     RTHA, 6wk        History of Present Illness:  This is a pleasant 78 y.o. male who is here today about 3 months status post right anterior total hip replacement.  He initially had a slow recovery.  He states he has gotten much better and other than some stiffness in the morning he does not have any complaints.  He states he has no pain in the hip.  He did end up going to [employer] a running store and getting a small quarter inch heel lift on the left side.  He states that this has helped a lot.    Physical Exam: The right hip range of motion is good while seated.  He has a good gait pattern.  The right leg is just slightly longer than the left quarter inch or so.    Imaging: None      Assessment and Plan:    This is a pleasant 78 y.o. male who is status post the above.  He has improved significantly and is doing quite well.  We discussed long-term expectations.  He can follow-up as needed.         Past History:    Current Outpatient Medications:     alfuzosin (UROXATRAL) 10 MG extended release tablet, Take 1 tablet by mouth daily, Disp: , Rfl:     Multiple Vitamins-Minerals (MULTIVITAMIN ADULTS PO), Take 1 tablet by mouth daily, Disp: , Rfl:     calcium 600 MG TABS tablet, Take 1 tablet by mouth daily, Disp: , Rfl:     Ferrous Sulfate (IRON) 325 (65 Fe) MG TABS, Take 1 tablet by mouth Every Day, Disp: , Rfl:     aspirin 81 MG EC tablet, Take 1 tablet by mouth daily, Disp: , Rfl:     psyllium (KONSYL) 28.3 % PACK, Take 1 packet by mouth 3 times daily, Disp: , Rfl:     levothyroxine (SYNTHROID) 200 MCG tablet, TAKE 1 TABLET BY MOUTH IN THE  MORNING ON AN EMPTY STOMACH  DAILY, Disp: 90 tablet, Rfl: 3

## 2024-03-13 ENCOUNTER — TELEPHONE (OUTPATIENT)
Age: 79
End: 2024-03-13

## 2024-03-13 ENCOUNTER — PATIENT MESSAGE (OUTPATIENT)
Age: 79
End: 2024-03-13

## 2024-03-13 NOTE — TELEPHONE ENCOUNTER
Start miralax otc daily until bowels start to move while holding metamucil. Take miralax daily for 1-2 days after Bms start moving and then add back metamucil 1/2 scoop dose, titrate up to a full scoop daily over a few weeks

## 2024-03-13 NOTE — TELEPHONE ENCOUNTER
Patient started taking metamucil about a year ago.  It was working up until recently. He hasn't had a BM since Sunday. Would like to know if he needs to take something different.

## 2024-07-30 ENCOUNTER — HOSPITAL ENCOUNTER (OUTPATIENT)
Dept: PREADMISSION TESTING | Age: 79
Discharge: HOME | End: 2024-08-03

## 2024-07-30 VITALS — WEIGHT: 205 LBS | BODY MASS INDEX: 26.31 KG/M2 | HEIGHT: 74 IN

## 2024-07-30 NOTE — PROGRESS NOTES
Pre-op Instructions For Out-Patient Surgery    Medication Instructions:  Please stop herbs and any supplements now (includes vitamins and minerals).    Please contact your surgeon and prescribing physician for pre-op instructions for any blood thinners. Aspirin and Eliquis     If you have inhalers/aerosol treatments at home, please use them the morning of your surgery and bring the inhalers with you to the hospital.    Please take the following medications the morning of your surgery with a sip of water:    Primidone, Metoprolol, and Levothyroxine.  Eye drops as instructed by Dr. Bright     Surgery Instructions:  After midnight before surgery:  Do not eat or drink anything, including water, mints, gum, and hard candy.  You may brush your teeth without swallowing.  No smoking, chewing tobacco, or street drugs.    Please shower or bathe before surgery.       Please do not wear any cologne, lotion, powder, deodorant, jewelry, piercings, perfume, makeup, nail polish, hair accessories, or hair spray on the day of surgery.  Wear loose comfortable clothing.    Leave your valuables at home but bring a payment source for any after-surgery prescriptions you plan to fill at [facility].  Bring a storage case for any glasses/contacts.    An adult who is responsible for you MUST drive you home and should be with you for the first 24 hours after surgery.     If having out-patient knee and foot surgeries, please arrange for planned crutches, walker, or wheelchair before arriving to the hospital.    The Day of Surgery:  Arrive at [facility] Surgery Entrance at the time directed by your surgeon and check in at the desk.     If you have a living will or healthcare power of , please bring a copy.    You will be taken to the pre-op holding area where you will be prepared for surgery.  A physical assessment will be performed by a nurse practitioner or house officer.  Your IV will

## 2024-08-05 ENCOUNTER — HOSPITAL ENCOUNTER (OUTPATIENT)
Age: 79
Setting detail: SPECIMEN
Discharge: HOME | End: 2024-08-05

## 2024-08-05 LAB
ALBUMIN SERPL-MCNC: 4.4 G/DL (ref 3.5–5.2)
ALBUMIN/GLOB SERPL: 2 {RATIO} (ref 1–2.5)
ALP SERPL-CCNC: 66 U/L (ref 40–129)
ALT SERPL-CCNC: 28 U/L (ref 10–50)
ANION GAP SERPL CALCULATED.3IONS-SCNC: 11 MMOL/L (ref 9–16)
AST SERPL-CCNC: 31 U/L (ref 10–50)
BASOPHILS # BLD: 0.03 K/UL (ref 0–0.2)
BASOPHILS NFR BLD: 1 % (ref 0–2)
BILIRUB DIRECT SERPL-MCNC: 0.2 MG/DL (ref 0–0.2)
BILIRUB INDIRECT SERPL-MCNC: 0.2 MG/DL (ref 0–1)
BILIRUB SERPL-MCNC: 0.4 MG/DL (ref 0–1.2)
BILIRUB UR QL STRIP: NEGATIVE
BUN SERPL-MCNC: 21 MG/DL (ref 8–23)
CALCIUM SERPL-MCNC: 8.6 MG/DL (ref 8.6–10.4)
CHLORIDE SERPL-SCNC: 105 MMOL/L (ref 98–107)
CHOLEST SERPL-MCNC: 127 MG/DL (ref 0–199)
CHOLESTEROL/HDL RATIO: 3
CLARITY UR: CLEAR
CO2 SERPL-SCNC: 27 MMOL/L (ref 20–31)
COLOR UR: YELLOW
COMMENT: NORMAL
CREAT SERPL-MCNC: 1 MG/DL (ref 0.7–1.2)
EOSINOPHIL # BLD: 0.2 K/UL (ref 0–0.44)
EOSINOPHILS RELATIVE PERCENT: 5 % (ref 1–4)
ERYTHROCYTE [DISTWIDTH] IN BLOOD BY AUTOMATED COUNT: 12.3 % (ref 11.8–14.4)
EST. AVERAGE GLUCOSE BLD GHB EST-MCNC: 108 MG/DL
GFR, ESTIMATED: 79 ML/MIN/1.73M2
GLUCOSE SERPL-MCNC: 101 MG/DL (ref 74–99)
GLUCOSE UR STRIP-MCNC: NEGATIVE MG/DL
HBA1C MFR BLD: 5.4 % (ref 4–6)
HCT VFR BLD AUTO: 44.7 % (ref 40.7–50.3)
HDLC SERPL-MCNC: 49 MG/DL
HGB BLD-MCNC: 15.1 G/DL (ref 13–17)
HGB UR QL STRIP.AUTO: NEGATIVE
IMM GRANULOCYTES # BLD AUTO: <0.03 K/UL (ref 0–0.3)
IMM GRANULOCYTES NFR BLD: 0 %
KETONES UR STRIP-MCNC: NEGATIVE MG/DL
LDLC SERPL CALC-MCNC: 59 MG/DL (ref 0–100)
LEUKOCYTE ESTERASE UR QL STRIP: NEGATIVE
LYMPHOCYTES NFR BLD: 1.23 K/UL (ref 1.1–3.7)
LYMPHOCYTES RELATIVE PERCENT: 27 % (ref 24–43)
MCH RBC QN AUTO: 31.6 PG (ref 25.2–33.5)
MCHC RBC AUTO-ENTMCNC: 33.8 G/DL (ref 28.4–34.8)
MCV RBC AUTO: 93.5 FL (ref 82.6–102.9)
MONOCYTES NFR BLD: 0.41 K/UL (ref 0.1–1.2)
MONOCYTES NFR BLD: 9 % (ref 3–12)
NEUTROPHILS NFR BLD: 58 % (ref 36–65)
NEUTS SEG NFR BLD: 2.61 K/UL (ref 1.5–8.1)
NITRITE UR QL STRIP: NEGATIVE
NRBC BLD-RTO: 0 PER 100 WBC
PH UR STRIP: 7 [PH] (ref 5–8)
PLATELET # BLD AUTO: 139 K/UL (ref 138–453)
PMV BLD AUTO: 9.5 FL (ref 8.1–13.5)
POTASSIUM SERPL-SCNC: 4.5 MMOL/L (ref 3.7–5.3)
PROT SERPL-MCNC: 6.7 G/DL (ref 6.6–8.7)
PROT UR STRIP-MCNC: NEGATIVE MG/DL
RBC # BLD AUTO: 4.78 M/UL (ref 4.21–5.77)
SODIUM SERPL-SCNC: 143 MMOL/L (ref 136–145)
SP GR UR STRIP: 1.02 (ref 1–1.03)
T4 FREE SERPL-MCNC: 1.5 NG/DL (ref 0.92–1.68)
TRIGL SERPL-MCNC: 95 MG/DL
TSH SERPL DL<=0.05 MIU/L-ACNC: 0.89 UIU/ML (ref 0.27–4.2)
UROBILINOGEN UR STRIP-ACNC: NORMAL EU/DL (ref 0–1)
VLDLC SERPL CALC-MCNC: 19 MG/DL
WBC OTHER # BLD: 4.5 K/UL (ref 3.5–11.3)

## 2024-08-06 ENCOUNTER — ANESTHESIA EVENT (OUTPATIENT)
Dept: OPERATING ROOM | Age: 79
End: 2024-08-06
Payer: MEDICARE

## 2024-08-07 ENCOUNTER — HOSPITAL ENCOUNTER (OUTPATIENT)
Age: 79
Setting detail: OUTPATIENT SURGERY
Discharge: HOME | End: 2024-08-07
Attending: OPHTHALMOLOGY | Admitting: OPHTHALMOLOGY
Payer: MEDICARE

## 2024-08-07 ENCOUNTER — ANESTHESIA (OUTPATIENT)
Dept: OPERATING ROOM | Age: 79
End: 2024-08-07
Payer: MEDICARE

## 2024-08-07 VITALS
HEIGHT: 74 IN | WEIGHT: 205 LBS | BODY MASS INDEX: 26.31 KG/M2 | OXYGEN SATURATION: 98 % | HEART RATE: 58 BPM | TEMPERATURE: 97.2 F | DIASTOLIC BLOOD PRESSURE: 62 MMHG | SYSTOLIC BLOOD PRESSURE: 125 MMHG | RESPIRATION RATE: 18 BRPM

## 2024-08-07 PROCEDURE — 3700000001 HC ADD 15 MINUTES (ANESTHESIA): Performed by: OPHTHALMOLOGY

## 2024-08-07 PROCEDURE — 2580000003 HC RX 258: Performed by: ANESTHESIOLOGY

## 2024-08-07 PROCEDURE — 6360000002 HC RX W HCPCS: Performed by: OPHTHALMOLOGY

## 2024-08-07 PROCEDURE — 3600000002 HC SURGERY LEVEL 2 BASE: Performed by: OPHTHALMOLOGY

## 2024-08-07 PROCEDURE — 2709999900 HC NON-CHARGEABLE SUPPLY: Performed by: OPHTHALMOLOGY

## 2024-08-07 PROCEDURE — 6370000000 HC RX 637 (ALT 250 FOR IP): Performed by: OPHTHALMOLOGY

## 2024-08-07 PROCEDURE — 3600000012 HC SURGERY LEVEL 2 ADDTL 15MIN: Performed by: OPHTHALMOLOGY

## 2024-08-07 PROCEDURE — 2500000003 HC RX 250 WO HCPCS: Performed by: OPHTHALMOLOGY

## 2024-08-07 PROCEDURE — 7100000010 HC PHASE II RECOVERY - FIRST 15 MIN: Performed by: OPHTHALMOLOGY

## 2024-08-07 PROCEDURE — 7100000011 HC PHASE II RECOVERY - ADDTL 15 MIN: Performed by: OPHTHALMOLOGY

## 2024-08-07 PROCEDURE — 3700000000 HC ANESTHESIA ATTENDED CARE: Performed by: OPHTHALMOLOGY

## 2024-08-07 PROCEDURE — V2632 POST CHMBR INTRAOCULAR LENS: HCPCS | Performed by: OPHTHALMOLOGY

## 2024-08-07 DEVICE — STERILE UV AND BLUE LIGHT FILTERING ACRYLIC FOLDABLE ASPHERIC POSTERIOR CHAMBER INTRAOCULAR LENS
Type: IMPLANTABLE DEVICE | Site: EYE | Status: FUNCTIONAL
Brand: CLAREON

## 2024-08-07 RX ORDER — CYCLOPENTOLATE HYDROCHLORIDE 10 MG/ML
1 SOLUTION/ DROPS OPHTHALMIC PRN
Status: DISCONTINUED | OUTPATIENT
Start: 2024-08-07 | End: 2024-08-07 | Stop reason: HOSPADM

## 2024-08-07 RX ORDER — PREDNISOLONE ACETATE 10 MG/ML
SUSPENSION/ DROPS OPHTHALMIC PRN
Status: DISCONTINUED | OUTPATIENT
Start: 2024-08-07 | End: 2024-08-07 | Stop reason: ALTCHOICE

## 2024-08-07 RX ORDER — CIPROFLOXACIN HYDROCHLORIDE 3.5 MG/ML
SOLUTION/ DROPS TOPICAL PRN
Status: DISCONTINUED | OUTPATIENT
Start: 2024-08-07 | End: 2024-08-07 | Stop reason: ALTCHOICE

## 2024-08-07 RX ORDER — PROPARACAINE HYDROCHLORIDE 5 MG/ML
1 SOLUTION/ DROPS OPHTHALMIC EVERY 5 MIN PRN
Status: COMPLETED | OUTPATIENT
Start: 2024-08-07 | End: 2024-08-07

## 2024-08-07 RX ORDER — SODIUM CHLORIDE 0.9 % (FLUSH) 0.9 %
5-40 SYRINGE (ML) INJECTION EVERY 12 HOURS SCHEDULED
Status: DISCONTINUED | OUTPATIENT
Start: 2024-08-07 | End: 2024-08-07 | Stop reason: HOSPADM

## 2024-08-07 RX ORDER — LIDOCAINE HYDROCHLORIDE 10 MG/ML
INJECTION, SOLUTION EPIDURAL; INFILTRATION; INTRACAUDAL; PERINEURAL PRN
Status: DISCONTINUED | OUTPATIENT
Start: 2024-08-07 | End: 2024-08-07 | Stop reason: ALTCHOICE

## 2024-08-07 RX ORDER — CYCLOPENTOLATE HYDROCHLORIDE 5 MG/ML
1 SOLUTION/ DROPS OPHTHALMIC EVERY 5 MIN PRN
Status: DISCONTINUED | OUTPATIENT
Start: 2024-08-07 | End: 2024-08-07 | Stop reason: RX

## 2024-08-07 RX ORDER — SODIUM CHLORIDE 9 MG/ML
INJECTION, SOLUTION INTRAVENOUS PRN
Status: DISCONTINUED | OUTPATIENT
Start: 2024-08-07 | End: 2024-08-07 | Stop reason: HOSPADM

## 2024-08-07 RX ORDER — LIDOCAINE HYDROCHLORIDE 10 MG/ML
1 INJECTION, SOLUTION EPIDURAL; INFILTRATION; INTRACAUDAL; PERINEURAL
Status: DISCONTINUED | OUTPATIENT
Start: 2024-08-07 | End: 2024-08-07 | Stop reason: HOSPADM

## 2024-08-07 RX ORDER — SODIUM CHLORIDE 0.9 % (FLUSH) 0.9 %
5-40 SYRINGE (ML) INJECTION PRN
Status: DISCONTINUED | OUTPATIENT
Start: 2024-08-07 | End: 2024-08-07 | Stop reason: HOSPADM

## 2024-08-07 RX ORDER — KETOROLAC TROMETHAMINE 5 MG/ML
1 SOLUTION OPHTHALMIC EVERY 5 MIN PRN
Status: DISCONTINUED | OUTPATIENT
Start: 2024-08-07 | End: 2024-08-07 | Stop reason: HOSPADM

## 2024-08-07 RX ORDER — TETRACAINE HYDROCHLORIDE 5 MG/ML
SOLUTION OPHTHALMIC PRN
Status: DISCONTINUED | OUTPATIENT
Start: 2024-08-07 | End: 2024-08-07 | Stop reason: ALTCHOICE

## 2024-08-07 RX ADMIN — SODIUM CHLORIDE, PRESERVATIVE FREE 10 ML: 5 INJECTION INTRAVENOUS at 06:57

## 2024-08-07 RX ADMIN — Medication 1 DROP: at 06:59

## 2024-08-07 RX ADMIN — CYCLOPENTOLATE HYDROCHLORIDE 1 DROP: 10 SOLUTION OPHTHALMIC at 06:54

## 2024-08-07 RX ADMIN — Medication 1 DROP: at 06:54

## 2024-08-07 RX ADMIN — Medication 1 DROP: at 06:47

## 2024-08-07 RX ADMIN — KETOROLAC TROMETHAMINE 1 DROP: 0.5 SOLUTION OPHTHALMIC at 06:47

## 2024-08-07 RX ADMIN — CYCLOPENTOLATE HYDROCHLORIDE 1 DROP: 10 SOLUTION OPHTHALMIC at 06:59

## 2024-08-07 RX ADMIN — CYCLOPENTOLATE HYDROCHLORIDE 1 DROP: 10 SOLUTION OPHTHALMIC at 06:47

## 2024-08-07 ASSESSMENT — ENCOUNTER SYMPTOMS
GASTROINTESTINAL NEGATIVE: 1
RESPIRATORY NEGATIVE: 1

## 2024-08-07 ASSESSMENT — PAIN - FUNCTIONAL ASSESSMENT
PAIN_FUNCTIONAL_ASSESSMENT: NONE - DENIES PAIN
PAIN_FUNCTIONAL_ASSESSMENT: 0-10

## 2024-08-07 NOTE — H&P
HISTORY and PHYSICAL  [facility]       NAME:  Prince Gutierrez  MRN: [id number]   YOB: 1945   Date: 8/7/2024   Age: 79 y.o.  Gender: male       COMPLAINT AND PRESENT HISTORY:     Prince Gutierrez is 79 y.o.,  male, presents for EYE CATARACT EMULSIFICATION INTRAOCULAR LENS IMPLANT RIGHT     Primary dx: Nuclear sclerotic cataract of right eye [H25.11].  HPI:    Prince Gutierrez is 79 y.o.,  male, complain of clouded, blurred/dim vision,and trouble seeing at night.Pt denies right  eye pain, redness, itching or drainage,  Pt denies  previous eye surgery, Pt using eye gtts as prescribed.  Pt has family history of glaucoma or blindness in his father.  Medical history reviewed.  Pt denies recent or current chest pain/pressure, palpitations, SOB, recent URI, fever or chills.     NPO p MN. Pt took  this am  medication Toprol XL  synthroid with sip of water.     Pt is on  blood thinning medications Eliquis and ASA last dose yesterday .    PAST MEDICAL HISTORY     Past Medical History:   Diagnosis Date    Arthritis     Atrial fibrillation (HCC)     BPH (benign prostatic hyperplasia)     CAD (coronary artery disease) 02/2022    x3 stents    Essential tremor     Glaucoma     Hyperlipidemia     Hypertension     Hypothyroid     Scoliosis     TIA (transient ischemic attack)     Under care of team     cardiology- Dr. Mchugh       SURGICAL HISTORY       Past Surgical History:   Procedure Laterality Date    CARDIOVERSION  03/2022    CHOLECYSTECTOMY      COLONOSCOPY  11/2015    luz marina, repeat 10yrs    CORONARY ANGIOPLASTY WITH STENT PLACEMENT  02/2022    Jeison, LAD 80-90%, 1st diag 50%, 2nd diag 60%, 1st obtuse 90%, RCA 60%, EF 55-60%    CORONARY ARTERY BYPASS GRAFT  02/2022    x3, Dr. Salas, [facility]    JOINT REPLACEMENT Left     hip    TONSILLECTOMY      TOTAL HIP ARTHROPLASTY Right 10/25/2023    RIGHT HIP ANTERIOR TOTAL ARTHROPLASTY WITH BIOMET AND PRE-OP CPNB    TOTAL HIP ARTHROPLASTY Right

## 2024-08-07 NOTE — OP NOTE
Operative Note      Patient: Prince Gutierrez  YOB: 1945  MRN: [id number]    Date of Procedure: 8/7/2024    Pre-Op Diagnosis Codes:     * Nuclear sclerotic cataract of right eye [H25.11]    Post-Op Diagnosis: Same       Procedure(s):  EYE CATARACT EMULSIFICATION INTRAOCULAR LENS IMPLANT RIGHT    Surgeon(s):  Garry Bright MD    Assistant:   * No surgical staff found *    Anesthesia: Monitor Anesthesia Care    Estimated Blood Loss (mL): Minimal    Complications: None    Specimens:   * No specimens in log *    Implants:  Implant Name Type Inv. Item Serial No.  Lot No. LRB No. Used Action   LENS CLAREON IOL 23.0D - [id number]  LENS CLAREON IOL 23.0D [id number]  Right 1 Implanted         Drains: * No LDAs found *    Findings:  Infection Present At Time Of Surgery (PATOS) (choose all levels that have infection present):  No infection present  Other Findings: Cataract    Detailed Description of Procedure:   This is a 79-year-old gentleman having increasing shadowing in his right eye he is having trouble with reading his best corrected vision is 2050 he has 3+ NS appears to understand the risk benefits alternatives of cataract surgery in his right eye and wishes to proceed.The patient was brought back into the operating suite, placed in supine position, prepped and draped in usual standard fashion. A lid speculum was placed into the right eye. Approximately 0.3 cc of plain nonpreserved lidocaine was placed onto the eye topically. A paracentesis tract was made at the 2 o'clock position with an eye knife. Then, 0.3 cc of 1% non-preserved lidocaine was placed into the anterior chamber. This was then replaced with Viscoat. Attention was directed superiorly. A conjunctival peritomy was carried out from the 11 o'clock to 1 o'clock position. Hemostasis was obtained with wet-field cautery. A scratch incision was made at the

## 2024-08-07 NOTE — ANESTHESIA PRE PROCEDURE
Department of Anesthesiology  Preprocedure Note       Name:  Prince Gutierrez   Age:  79 y.o.  :  1945                                          MRN:  [id number]         Date:  2024      Surgeon: Surgeon(s):  Garry Bright MD    Procedure: Procedure(s):  EYE CATARACT EMULSIFICATION INTRAOCULAR LENS IMPLANT RIGHT    Medications prior to admission:   Prior to Admission medications    Medication Sig Start Date End Date Taking? Authorizing Provider   alfuzosin (UROXATRAL) 10 MG extended release tablet Take 1 tablet by mouth daily    Marva Meza MD   Multiple Vitamins-Minerals (MULTIVITAMIN ADULTS PO) Take 1 tablet by mouth daily    Marva Meza MD   calcium 600 MG TABS tablet Take 1 tablet by mouth daily 16   Marva Meza MD   Ferrous Sulfate (IRON) 325 (65 Fe) MG TABS Take 1 tablet by mouth Every Day    Marva Meza MD   aspirin 81 MG EC tablet Take 1 tablet by mouth daily    Marva Meza MD   psyllium (KONSYL) 28.3 % PACK Take 1 packet by mouth 3 times daily  Patient not taking: Reported on 2024    Marva Meza MD   levothyroxine (SYNTHROID) 200 MCG tablet TAKE 1 TABLET BY MOUTH IN THE  MORNING ON AN EMPTY STOMACH  DAILY 24   Mulugeta Bruce MD   Handicap Placard MISC by Does not apply route Handicap placard to be given for 6 (six) months. 24   Howard Knott MD   primidone (MYSOLINE) 50 MG tablet TAKE 2 TABLETS BY MOUTH TWICE  DAILY AND 1 TABLET BY MOUTH AT  BEDTIME AS DIRECTED  Patient taking differently: Take 1 tablet by mouth daily 2 tablets in the AM   2 tablets at noon   1 tablet at night 23   Mulugeta Bruce MD   apixaban (ELIQUIS) 5 MG TABS tablet Take 1 tablet by mouth 2 times daily 10/27/23   Howard Knott MD   atorvastatin (LIPITOR) 80 MG tablet Take 1 tablet by mouth at bedtime 7/10/23   Marva Meza MD   latanoprost (XALATAN) 0.005 % ophthalmic solution Place 1 drop into both eyes nightly 23

## 2024-08-07 NOTE — ANESTHESIA POSTPROCEDURE EVALUATION
Department of Anesthesiology  Postprocedure Note    Patient: Prince Gutierrez  MRN: [id number]  YOB: 1945  Date of evaluation: 8/7/2024    Procedure Summary       Date: 08/07/24 Room / Location: [address]    Anesthesia Start: 0734 Anesthesia Stop: 0802    Procedure: EYE CATARACT EMULSIFICATION INTRAOCULAR LENS IMPLANT RIGHT (Right: Eye) Diagnosis:       Nuclear sclerotic cataract of right eye      (Nuclear sclerotic cataract of right eye [H25.11])    Surgeons: Garry Bright MD Responsible Provider: Aishwarya Kaplan MD    Anesthesia Type: MAC ASA Status: 3            Anesthesia Type: No value filed.    Jase Phase I:      Jase Phase II: Jase Score: 10    Anesthesia Post Evaluation    Comments: POST- ANESTHESIA EVALUATION       Pt Name: Prince Gutierrez  MRN: [id number]  YOB: 1945  Date of evaluation: 8/7/2024  Time:  8:34 AM      /62   Pulse 58   Temp 97.2 °F (36.2 °C) (Infrared)   Resp 18   Ht 1.88 m (6' 2\")   Wt 93 kg (205 lb)   SpO2 98%   BMI 26.32 kg/m²      Consciousness Level  Awake  Cardiopulmonary Status  Stable  Pain Adequately Treated YES  Nausea / Vomiting  NO  Adequate Hydration  YES  Anesthesia Related Complications NONE      Electronically signed by Aishwarya Kaplan MD on 8/7/2024 at 8:34 AM      No notable events documented.

## 2024-08-12 ENCOUNTER — OFFICE VISIT (OUTPATIENT)
Age: 79
End: 2024-08-12
Payer: MEDICARE

## 2024-08-12 VITALS
OXYGEN SATURATION: 99 % | HEIGHT: 74 IN | TEMPERATURE: 97.5 F | SYSTOLIC BLOOD PRESSURE: 104 MMHG | BODY MASS INDEX: 27.13 KG/M2 | HEART RATE: 61 BPM | DIASTOLIC BLOOD PRESSURE: 70 MMHG | WEIGHT: 211.4 LBS

## 2024-08-12 DIAGNOSIS — I10 ESSENTIAL HYPERTENSION: Primary | ICD-10-CM

## 2024-08-12 DIAGNOSIS — G25.0 ESSENTIAL TREMOR: ICD-10-CM

## 2024-08-12 DIAGNOSIS — E78.5 DYSLIPIDEMIA: ICD-10-CM

## 2024-08-12 DIAGNOSIS — R73.9 HYPERGLYCEMIA: ICD-10-CM

## 2024-08-12 DIAGNOSIS — E03.9 HYPOTHYROIDISM, UNSPECIFIED TYPE: ICD-10-CM

## 2024-08-12 DIAGNOSIS — I48.0 PAROXYSMAL ATRIAL FIBRILLATION (HCC): ICD-10-CM

## 2024-08-12 DIAGNOSIS — R53.83 FATIGUE, UNSPECIFIED TYPE: ICD-10-CM

## 2024-08-12 DIAGNOSIS — I25.10 CORONARY ARTERY DISEASE INVOLVING NATIVE CORONARY ARTERY OF NATIVE HEART WITHOUT ANGINA PECTORIS: ICD-10-CM

## 2024-08-12 PROCEDURE — G8427 DOCREV CUR MEDS BY ELIG CLIN: HCPCS | Performed by: FAMILY MEDICINE

## 2024-08-12 PROCEDURE — 1036F TOBACCO NON-USER: CPT | Performed by: FAMILY MEDICINE

## 2024-08-12 PROCEDURE — 3074F SYST BP LT 130 MM HG: CPT | Performed by: FAMILY MEDICINE

## 2024-08-12 PROCEDURE — G8419 CALC BMI OUT NRM PARAM NOF/U: HCPCS | Performed by: FAMILY MEDICINE

## 2024-08-12 PROCEDURE — 1123F ACP DISCUSS/DSCN MKR DOCD: CPT | Performed by: FAMILY MEDICINE

## 2024-08-12 PROCEDURE — 3078F DIAST BP <80 MM HG: CPT | Performed by: FAMILY MEDICINE

## 2024-08-12 PROCEDURE — 99214 OFFICE O/P EST MOD 30 MIN: CPT | Performed by: FAMILY MEDICINE

## 2024-08-12 NOTE — PROGRESS NOTES
ANTERIOR TOTAL ARTHROPLASTY WITH BIOMET AND PRE-OP CPNB performed by Howard Knott MD at [facility] OR        Social History     Socioeconomic History    Marital status:      Spouse name: None    Number of children: None    Years of education: None    Highest education level: None   Tobacco Use    Smoking status: Never     Passive exposure: Never    Smokeless tobacco: Never   Vaping Use    Vaping status: Never Used   Substance and Sexual Activity    Alcohol use: Yes     Comment: occaionally1-2x per week    Drug use: Never    Sexual activity: Defer   Social History Narrative    son and daughter live in [place], granddaugther going to [facility]       son with CP, , [email or web address]      Social Determinants of Health     Financial Resource Strain: Low Risk  (1/24/2024)    Overall Financial Resource Strain (CARDIA)     Difficulty of Paying Living Expenses: Not hard at all   Food Insecurity: No Food Insecurity (1/24/2024)    Hunger Vital Sign     Worried About Running Out of Food in the Last Year: Never true     Ran Out of Food in the Last Year: Never true   Transportation Needs: Unknown (1/24/2024)    PRAPARE - Transportation     Lack of Transportation (Non-Medical): No    [facility] [place] [place]    [place]   Housing Stability: Unknown (1/24/2024)    Housing Stability Vital Sign     Unstable Housing in the Last Year: No        Family History   Problem Relation Age of Onset    Cancer Mother         lung    Diabetes Mother     Hypertension Father     Cancer Father 79        prostate      Hemoglobin A1C   Date Value Ref Range Status   08/05/2024 5.4 4.0 - 6.0 % Final   01/16/2024 5.1 4.0 - 6.0 % Final   07/05/2023 5.3 4.0 - 6.0 % Final       Lab Results   Component Value Date/Time     08/05/2024 10:10 AM    K 4.5 08/05/2024 10:10 AM     08/05/2024 10:10 AM    CO2 27 08/05/2024 10:10 AM    BUN 21 08/05/2024 10:10 AM

## 2024-08-14 NOTE — ASSESSMENT & PLAN NOTE
rate controlled with metoprolol, continue Eliquis and follow with cardio   Detail Level: Zone Detail Level: Detailed

## 2024-08-14 NOTE — ASSESSMENT & PLAN NOTE
blood pressure well-controlled on metoprolol, if SBP starts to run in the low 100s and he becomes symptomatic would cut dose of metoprolol in half.  Reviewed recent renal function normal

## 2024-08-14 NOTE — ASSESSMENT & PLAN NOTE
continue risk reduction with aspirin daily, blood pressure and lipid control, reviewed recent LDL 59, HDL 49 glucose 101, hemoglobin A1c 5.4

## 2024-11-07 RX ORDER — PRIMIDONE 50 MG/1
50 TABLET ORAL DAILY
Qty: 450 TABLET | Refills: 3 | Status: SHIPPED | OUTPATIENT
Start: 2024-11-07

## 2024-11-07 NOTE — TELEPHONE ENCOUNTER
Prince Gutierrez is calling to request a refill on the following medication(s):    Medication Request:  Requested Prescriptions     Pending Prescriptions Disp Refills    primidone (MYSOLINE) 50 MG tablet [Pharmacy Med Name: Primidone 50 MG Oral Tablet] 450 tablet 3     Sig: TAKE 2 TABLETS BY MOUTH TWICE  DAILY AND 1 TABLET BY MOUTH AT  BEDTIME AS DIRECTED       Last Visit Date (If Applicable):  8/12/2024    Next Visit Date:    2/27/2025

## 2024-11-25 RX ORDER — PRIMIDONE 50 MG/1
TABLET ORAL
Qty: 450 TABLET | Refills: 3 | Status: SHIPPED | OUTPATIENT
Start: 2024-11-25

## 2024-12-11 RX ORDER — PRIMIDONE 50 MG/1
TABLET ORAL
Qty: 450 TABLET | Refills: 3 | Status: SHIPPED | OUTPATIENT
Start: 2024-12-11

## 2024-12-11 NOTE — TELEPHONE ENCOUNTER
Patient was told to call by [facility] because they mis located his primidone (MYSOLINE) 50 MG tablet that we sent on 11/25 it is stuck in [place] apparently and they said patient should contact their pcp so pcp cam put in an emergency refill and send it to [facility] instead.

## 2024-12-11 NOTE — TELEPHONE ENCOUNTER
Prince Desirising is calling to request a refill on the following medication(s):    Medication Request:  Requested Prescriptions     Pending Prescriptions Disp Refills    primidone (MYSOLINE) 50 MG tablet 450 tablet 3     Sig: TAKE 2 TABLETS BY MOUTH TWICE  DAILY AND 1 TABLET BY MOUTH AT  BEDTIME AS DIRECTED       Last Visit Date (If Applicable):  8/12/2024    Next Visit Date:    2/27/2025

## 2024-12-16 ENCOUNTER — TELEPHONE (OUTPATIENT)
Age: 79
End: 2024-12-16

## 2024-12-16 NOTE — TELEPHONE ENCOUNTER
Patient has been having issues falling back to sleep at night. Its been getting worse the last few weeks. And last night it was pretty bad. He has been trying to do reading  at night, breathing exercises...but nothing seems to be helping. Wondering if you could take a look at his meds and see what would help him to take either OTC or prescribed.    all other ROS negative except as per HPI

## 2024-12-17 NOTE — TELEPHONE ENCOUNTER
If he has not tried melatonin or magnesium glycinate OTC I would try either of those; if neither of these work , we can try prescription

## 2024-12-17 NOTE — TELEPHONE ENCOUNTER
Patient calling back to give a phone number to call him with answer:  [phone]    Sending to provider in office

## 2024-12-18 ENCOUNTER — TELEPHONE (OUTPATIENT)
Age: 79
End: 2024-12-18

## 2024-12-18 DIAGNOSIS — F51.01 PRIMARY INSOMNIA: Primary | ICD-10-CM

## 2024-12-18 RX ORDER — TRAZODONE HYDROCHLORIDE 50 MG/1
TABLET, FILM COATED ORAL
Qty: 60 TABLET | Refills: 1 | Status: SHIPPED | OUTPATIENT
Start: 2024-12-18

## 2024-12-18 NOTE — TELEPHONE ENCOUNTER
This note is pertaining to the task from 12/16 , I thought I was on that one when I typed this up : Patient states he using the magnesium and melatonin to help with insomnia but he is wanting to know if he can get a prescription for a sleeping med because they are leaving for [place] tomorrow and will be there all through the holidays and so far they do not help good enough. He did say he would keep trying them and only use the prescription if he has to. He would like it sent in today if possible and Dr Dalal is not here so he asked that I send this to a dr that is. Please advise

## 2024-12-18 NOTE — TELEPHONE ENCOUNTER
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

## 2024-12-18 NOTE — TELEPHONE ENCOUNTER
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

## 2025-01-06 ENCOUNTER — TELEPHONE (OUTPATIENT)
Age: 80
End: 2025-01-06

## 2025-01-06 DIAGNOSIS — E03.9 HYPOTHYROIDISM, UNSPECIFIED TYPE: Primary | ICD-10-CM

## 2025-01-06 DIAGNOSIS — F51.01 PRIMARY INSOMNIA: ICD-10-CM

## 2025-01-06 RX ORDER — TEMAZEPAM 30 MG/1
30 CAPSULE ORAL NIGHTLY PRN
Qty: 30 CAPSULE | Refills: 0 | Status: SHIPPED | OUTPATIENT
Start: 2025-01-06 | End: 2025-02-05

## 2025-01-06 RX ORDER — LEVOTHYROXINE SODIUM 200 UG/1
200 TABLET ORAL DAILY
Qty: 90 TABLET | Refills: 3 | Status: SHIPPED | OUTPATIENT
Start: 2025-01-06 | End: 2025-01-07 | Stop reason: SDUPTHER

## 2025-01-06 NOTE — TELEPHONE ENCOUNTER
Patient calling because he was given a insomnia med recently and felt lou it was working but the last 2-3 nights felt like it hasn't been working. He has 2 caps left wondering what next steps would be?

## 2025-01-06 NOTE — TELEPHONE ENCOUNTER
I sent rf for restoril 30mg to take HS prn (this is equal to two tabs of the 15mg he has at home to try). Please call pharmacy, as I realized as it was transmitting, that there is overlap of 2 instructions. Sig should read: take 1 cap PO HS prn insomnia (NOT, take 1-2 cap HS prn). Please call pharmacy and clarify sig to avoid them contacting us and pt getting his rx

## 2025-01-06 NOTE — TELEPHONE ENCOUNTER
Spoke to pt he was actually taking one at bedtime and if he woke in middle of night he would take another pill. He said he would try taking 2 at bedtime and see how that works for him  instead .  Please send refill to the [facility] in [place]

## 2025-01-07 DIAGNOSIS — E03.9 HYPOTHYROIDISM, UNSPECIFIED TYPE: ICD-10-CM

## 2025-01-07 RX ORDER — LEVOTHYROXINE SODIUM 200 UG/1
200 TABLET ORAL DAILY
Qty: 90 TABLET | Refills: 3 | Status: SHIPPED | OUTPATIENT
Start: 2025-01-07

## 2025-01-07 NOTE — TELEPHONE ENCOUNTER
Sending to provider in office as he needs today   Prince LORNA Desirising is calling to request a refill on the following medication(s):    Medication Request:  Requested Prescriptions     Pending Prescriptions Disp Refills    levothyroxine (SYNTHROID) 200 MCG tablet 90 tablet 3     Sig: Take 1 tablet by mouth Daily       Last Visit Date (If Applicable):  8/12/2024    Next Visit Date:    2/27/2025              
Statement Selected

## 2025-01-10 DIAGNOSIS — E03.9 HYPOTHYROIDISM, UNSPECIFIED TYPE: ICD-10-CM

## 2025-01-10 NOTE — TELEPHONE ENCOUNTER
Prince Gutierrez is calling to request a refill on the following medication(s):    Medication Request:  Requested Prescriptions     Pending Prescriptions Disp Refills    levothyroxine (SYNTHROID) 200 MCG tablet 90 tablet 3     Sig: Take 1 tablet by mouth Daily       Last Visit Date (If Applicable):  8/12/2024    Next Visit Date:    2/27/2025

## 2025-01-13 RX ORDER — LEVOTHYROXINE SODIUM 200 UG/1
200 TABLET ORAL DAILY
Qty: 90 TABLET | Refills: 3 | Status: SHIPPED | OUTPATIENT
Start: 2025-01-13

## 2025-02-17 ENCOUNTER — TELEPHONE (OUTPATIENT)
Age: 80
End: 2025-02-17

## 2025-02-17 DIAGNOSIS — Z12.5 PROSTATE CANCER SCREENING: Primary | ICD-10-CM

## 2025-02-17 NOTE — TELEPHONE ENCOUNTER
Patient will be getting labs done before his visit with Dr HOWARD on 2/27    He would like PSA added to the list    He will be going to [facility]     He knows Dr HOWARD not in office today - he plans on getting labs done on Wednesday

## 2025-02-21 ENCOUNTER — HOSPITAL ENCOUNTER (OUTPATIENT)
Age: 80
Setting detail: SPECIMEN
Discharge: HOME | End: 2025-02-21

## 2025-02-21 DIAGNOSIS — I10 ESSENTIAL HYPERTENSION: ICD-10-CM

## 2025-02-21 DIAGNOSIS — E03.9 HYPOTHYROIDISM, UNSPECIFIED TYPE: ICD-10-CM

## 2025-02-21 DIAGNOSIS — R73.9 HYPERGLYCEMIA: ICD-10-CM

## 2025-02-21 DIAGNOSIS — R53.83 FATIGUE, UNSPECIFIED TYPE: ICD-10-CM

## 2025-02-21 DIAGNOSIS — E78.5 DYSLIPIDEMIA: ICD-10-CM

## 2025-02-21 LAB
ALBUMIN SERPL-MCNC: 4.1 G/DL (ref 3.5–5.2)
ALBUMIN/GLOB SERPL: 1.7 {RATIO} (ref 1–2.5)
ALP SERPL-CCNC: 72 U/L (ref 40–129)
ALT SERPL-CCNC: 29 U/L (ref 10–50)
ANION GAP SERPL CALCULATED.3IONS-SCNC: 11 MMOL/L (ref 9–16)
AST SERPL-CCNC: 32 U/L (ref 10–50)
BASOPHILS # BLD: 0.03 K/UL (ref 0–0.2)
BASOPHILS NFR BLD: 1 % (ref 0–2)
BILIRUB DIRECT SERPL-MCNC: 0.2 MG/DL (ref 0–0.2)
BILIRUB INDIRECT SERPL-MCNC: 0.1 MG/DL (ref 0–1)
BILIRUB SERPL-MCNC: 0.3 MG/DL (ref 0–1.2)
BUN SERPL-MCNC: 20 MG/DL (ref 8–23)
CALCIUM SERPL-MCNC: 8.3 MG/DL (ref 8.6–10.4)
CHLORIDE SERPL-SCNC: 105 MMOL/L (ref 98–107)
CHOLEST SERPL-MCNC: 127 MG/DL (ref 0–199)
CHOLESTEROL/HDL RATIO: 2.4
CO2 SERPL-SCNC: 28 MMOL/L (ref 20–31)
CREAT SERPL-MCNC: 1.1 MG/DL (ref 0.7–1.2)
EOSINOPHIL # BLD: 0.17 K/UL (ref 0–0.44)
EOSINOPHILS RELATIVE PERCENT: 4 % (ref 1–4)
ERYTHROCYTE [DISTWIDTH] IN BLOOD BY AUTOMATED COUNT: 13.2 % (ref 11.8–14.4)
EST. AVERAGE GLUCOSE BLD GHB EST-MCNC: 103 MG/DL
GFR, ESTIMATED: 68 ML/MIN/1.73M2
GLUCOSE SERPL-MCNC: 107 MG/DL (ref 74–99)
HBA1C MFR BLD: 5.2 % (ref 4–6)
HCT VFR BLD AUTO: 42.6 % (ref 40.7–50.3)
HDLC SERPL-MCNC: 52 MG/DL
HGB BLD-MCNC: 14 G/DL (ref 13–17)
IMM GRANULOCYTES # BLD AUTO: <0.03 K/UL (ref 0–0.3)
IMM GRANULOCYTES NFR BLD: 0 %
LDLC SERPL CALC-MCNC: 62 MG/DL (ref 0–100)
LYMPHOCYTES NFR BLD: 0.89 K/UL (ref 1.1–3.7)
LYMPHOCYTES RELATIVE PERCENT: 20 % (ref 24–43)
MCH RBC QN AUTO: 31.8 PG (ref 25.2–33.5)
MCHC RBC AUTO-ENTMCNC: 32.9 G/DL (ref 28.4–34.8)
MCV RBC AUTO: 96.8 FL (ref 82.6–102.9)
MONOCYTES NFR BLD: 0.39 K/UL (ref 0.1–1.2)
MONOCYTES NFR BLD: 9 % (ref 3–12)
NEUTROPHILS NFR BLD: 66 % (ref 36–65)
NEUTS SEG NFR BLD: 2.96 K/UL (ref 1.5–8.1)
NRBC BLD-RTO: 0 PER 100 WBC
PLATELET # BLD AUTO: 156 K/UL (ref 138–453)
PMV BLD AUTO: 9.7 FL (ref 8.1–13.5)
POTASSIUM SERPL-SCNC: 4.8 MMOL/L (ref 3.7–5.3)
PROT SERPL-MCNC: 6.5 G/DL (ref 6.6–8.7)
PSA SERPL-MCNC: 1.7 NG/ML (ref 0–4)
RBC # BLD AUTO: 4.4 M/UL (ref 4.21–5.77)
SODIUM SERPL-SCNC: 144 MMOL/L (ref 136–145)
T4 FREE SERPL-MCNC: 1.2 NG/DL (ref 0.92–1.68)
TRIGL SERPL-MCNC: 67 MG/DL
TSH SERPL DL<=0.05 MIU/L-ACNC: 4 UIU/ML (ref 0.27–4.2)
VLDLC SERPL CALC-MCNC: 13 MG/DL (ref 1–30)
WBC OTHER # BLD: 4.5 K/UL (ref 3.5–11.3)

## 2025-02-27 ENCOUNTER — TELEPHONE (OUTPATIENT)
Age: 80
End: 2025-02-27

## 2025-02-27 ENCOUNTER — OFFICE VISIT (OUTPATIENT)
Age: 80
End: 2025-02-27

## 2025-02-27 VITALS
HEART RATE: 64 BPM | OXYGEN SATURATION: 98 % | SYSTOLIC BLOOD PRESSURE: 120 MMHG | DIASTOLIC BLOOD PRESSURE: 66 MMHG | HEIGHT: 74 IN | WEIGHT: 212.2 LBS | TEMPERATURE: 97.6 F | BODY MASS INDEX: 27.23 KG/M2

## 2025-02-27 DIAGNOSIS — I48.0 PAROXYSMAL ATRIAL FIBRILLATION (HCC): ICD-10-CM

## 2025-02-27 DIAGNOSIS — I87.2 VENOUS INSUFFICIENCY OF BOTH LOWER EXTREMITIES: ICD-10-CM

## 2025-02-27 DIAGNOSIS — E55.9 VITAMIN D DEFICIENCY: ICD-10-CM

## 2025-02-27 DIAGNOSIS — E03.9 HYPOTHYROIDISM, UNSPECIFIED TYPE: ICD-10-CM

## 2025-02-27 DIAGNOSIS — R53.83 FATIGUE, UNSPECIFIED TYPE: ICD-10-CM

## 2025-02-27 DIAGNOSIS — I25.10 CORONARY ARTERY DISEASE INVOLVING NATIVE CORONARY ARTERY OF NATIVE HEART WITHOUT ANGINA PECTORIS: ICD-10-CM

## 2025-02-27 DIAGNOSIS — I10 ESSENTIAL HYPERTENSION: ICD-10-CM

## 2025-02-27 DIAGNOSIS — I87.2 VENOUS INSUFFICIENCY OF BOTH LOWER EXTREMITIES: Primary | ICD-10-CM

## 2025-02-27 DIAGNOSIS — G25.0 ESSENTIAL TREMOR: ICD-10-CM

## 2025-02-27 DIAGNOSIS — Z00.00 MEDICARE ANNUAL WELLNESS VISIT, SUBSEQUENT: Primary | ICD-10-CM

## 2025-02-27 DIAGNOSIS — E78.5 DYSLIPIDEMIA: ICD-10-CM

## 2025-02-27 DIAGNOSIS — Z12.5 PROSTATE CANCER SCREENING: ICD-10-CM

## 2025-02-27 DIAGNOSIS — R73.9 HYPERGLYCEMIA: ICD-10-CM

## 2025-02-27 SDOH — ECONOMIC STABILITY: FOOD INSECURITY: WITHIN THE PAST 12 MONTHS, YOU WORRIED THAT YOUR FOOD WOULD RUN OUT BEFORE YOU GOT MONEY TO BUY MORE.: NEVER TRUE

## 2025-02-27 SDOH — ECONOMIC STABILITY: FOOD INSECURITY: WITHIN THE PAST 12 MONTHS, THE FOOD YOU BOUGHT JUST DIDN'T LAST AND YOU DIDN'T HAVE MONEY TO GET MORE.: NEVER TRUE

## 2025-02-27 ASSESSMENT — PATIENT HEALTH QUESTIONNAIRE - PHQ9
SUM OF ALL RESPONSES TO PHQ QUESTIONS 1-9: 0
SUM OF ALL RESPONSES TO PHQ9 QUESTIONS 1 & 2: 0
1. LITTLE INTEREST OR PLEASURE IN DOING THINGS: NOT AT ALL
2. FEELING DOWN, DEPRESSED OR HOPELESS: NOT AT ALL

## 2025-02-27 ASSESSMENT — LIFESTYLE VARIABLES
HOW OFTEN DO YOU HAVE A DRINK CONTAINING ALCOHOL: 2-4 TIMES A MONTH
HOW MANY STANDARD DRINKS CONTAINING ALCOHOL DO YOU HAVE ON A TYPICAL DAY: 1 OR 2

## 2025-02-27 NOTE — PROGRESS NOTES
Medicare Annual Wellness Visit    Prince LORNA Gutierrez is here for Medicare AWV (He is here for his AWV with labs done on 2/21/2025. )    Assessment & Plan  1. Medicare annual wellness visit, subsequent  His blood pressure and lab results are within normal limits. LDL cholesterol is 62, HDL cholesterol is 52, thyroid levels are normal, blood counts are normal, PSA is stable, and hemoglobin A1c is 5.2. Neutrophils are slightly elevated, but total white count is normal, indicating no active infection.  Continue regular exercise and healthy eating habits.  Up-to-date on screening test.    2.Bowel irregularities.  He reports experiencing a lot of gas, which may be due to fiber supplements like Metamucil. He is advised to try different fiber supplements such as FiberCon or generic psyllium without additives. Probiotics may also help regulate his stools. If gas persists, he can try simethicone (Gas-X) but should discontinue if it causes cramping.    3. Benign prostatic hyperplasia (BPH).  His PSA levels are stable, and he is under the care of a urologist. He is advised to continue annual PSA testing if recommended by his urologist. If his PSA level reaches 4, he should contact his urologist.    4. Atrial fibrillation.  He had a discussion with his cardiologist about Eliquis and a new FDA-approved procedure involving the insertion of an umbrella-like device to catch clots. He is advised to continue his current medication regimen as it is effective and well-tolerated.    5.  Sleep issues.  He is currently managing his sleep issues with melatonin and magnesium, which he reports are effective.  Not taking trazodone    6.  Hypothyroidism  Thyroid profile stable, continue current dose levothyroxine    7.  Essential tremor  Controlled with primidone and metoprolol, continue    8.  Hyperlipidemia  Lipids well-controlled on atorvastatin, continue.  Glucose 107 hemoglobin A1c 5.2, continue to monitor labs    9.  CAD  Continue risk

## 2025-02-27 NOTE — TELEPHONE ENCOUNTER
Patient forgot to ask for an order for support stockings when he was in today for his appt    He goes to [facility].  Patient says he also needs a note as to why stockings are needed.  He uses for blood clots    Patient says he will  order and note    Call when ready  [phone]

## 2025-02-27 NOTE — TELEPHONE ENCOUNTER
In his office visit today can you make comment about needing the compression stockings for blood clots so we can fax the OV note to [facility] since he is on medicare.

## 2025-02-27 NOTE — PATIENT INSTRUCTIONS
Prince    Thank you for choosing [facility].  We know you have options when it comes to your healthcare; we appreciate that you chose us. Our goal is to provide exceptional  service and world class care to every patient.  You will be receiving a survey via email or text message asking for your feedback.  Please take a few minutes to share your thoughts about your recent visit. Your comments help us understand what we do well and ways we can improve.  Thank you in advance for your valuable feedback.      Dr. MARICRUZ Bruce MD        A Healthy Heart: Care Instructions  Overview     Coronary artery disease, also called heart disease, occurs when a substance called plaque builds up in the vessels that supply oxygen-rich blood to your heart muscle. This can narrow the blood vessels and reduce blood flow. A heart attack happens when blood flow is completely blocked. A high-fat diet, smoking, and other factors increase the risk of heart disease.  Your doctor has found that you have a chance of having heart disease. A heart-healthy lifestyle can help keep your heart healthy and prevent heart disease. This lifestyle includes eating healthy, being active, staying at a weight that's healthy for you, and not smoking or using tobacco. It also includes taking medicines as directed, managing other health conditions, and trying to get a healthy amount of sleep.  Follow-up care is a key part of your treatment and safety. Be sure to make and go to all appointments, and call your doctor if you are having problems. It's also a good idea to know your test results and keep a list of the medicines you take.  How can you care for yourself at home?  Diet    Use less salt when you cook and eat. This helps lower your blood pressure. Taste food before salting. Add only a little salt when you think you need it. With time, your taste buds will adjust to less salt.     Eat fewer snack items, fast foods, canned soups, and other

## 2025-03-02 PROBLEM — Z00.00 MEDICARE ANNUAL WELLNESS VISIT, SUBSEQUENT: Status: ACTIVE | Noted: 2025-03-02

## 2025-04-01 PROBLEM — Z00.00 MEDICARE ANNUAL WELLNESS VISIT, SUBSEQUENT: Status: RESOLVED | Noted: 2025-03-02 | Resolved: 2025-04-01

## 2025-06-23 ENCOUNTER — APPOINTMENT (OUTPATIENT)
Dept: CT IMAGING | Age: 80
End: 2025-06-23
Payer: MEDICARE

## 2025-06-23 ENCOUNTER — HOSPITAL ENCOUNTER (EMERGENCY)
Age: 80
Discharge: HOME | End: 2025-06-23
Attending: EMERGENCY MEDICINE
Payer: MEDICARE

## 2025-06-23 VITALS
RESPIRATION RATE: 20 BRPM | SYSTOLIC BLOOD PRESSURE: 134 MMHG | OXYGEN SATURATION: 98 % | HEIGHT: 74 IN | HEART RATE: 64 BPM | BODY MASS INDEX: 25.03 KG/M2 | TEMPERATURE: 98.2 F | DIASTOLIC BLOOD PRESSURE: 72 MMHG | WEIGHT: 195 LBS

## 2025-06-23 DIAGNOSIS — C79.9 METASTASIS FROM MALIGNANT NEOPLASM OF LIVER (HCC): ICD-10-CM

## 2025-06-23 DIAGNOSIS — C22.8 METASTASIS FROM MALIGNANT NEOPLASM OF LIVER (HCC): ICD-10-CM

## 2025-06-23 DIAGNOSIS — K31.89 MASS OF GASTROESOPHAGEAL JUNCTION: Primary | ICD-10-CM

## 2025-06-23 DIAGNOSIS — N39.0 URINARY TRACT INFECTION WITHOUT HEMATURIA, SITE UNSPECIFIED: ICD-10-CM

## 2025-06-23 LAB
ALBUMIN SERPL-MCNC: 3.9 G/DL (ref 3.5–5.2)
ALBUMIN/GLOB SERPL: 1.3 {RATIO} (ref 1–2.5)
ALP SERPL-CCNC: 111 U/L (ref 40–129)
ALT SERPL-CCNC: 18 U/L (ref 10–50)
AMYLASE SERPL-CCNC: 54 U/L (ref 28–100)
ANION GAP SERPL CALCULATED.3IONS-SCNC: 13 MMOL/L (ref 9–16)
AST SERPL-CCNC: 39 U/L (ref 10–50)
BACTERIA URNS QL MICRO: ABNORMAL
BASOPHILS # BLD: 0.01 K/UL (ref 0–0.2)
BASOPHILS NFR BLD: 0 % (ref 0–2)
BILIRUB SERPL-MCNC: 0.6 MG/DL (ref 0–1.2)
BILIRUB UR QL STRIP: ABNORMAL
BUN SERPL-MCNC: 19 MG/DL (ref 8–23)
CALCIUM SERPL-MCNC: 8.9 MG/DL (ref 8.6–10.4)
CHARACTER UR: ABNORMAL
CHLORIDE SERPL-SCNC: 105 MMOL/L (ref 98–107)
CLARITY UR: CLEAR
CO2 SERPL-SCNC: 22 MMOL/L (ref 20–31)
COLOR UR: YELLOW
CREAT SERPL-MCNC: 1 MG/DL (ref 0.7–1.2)
EKG ATRIAL RATE: 60 BPM
EKG P AXIS: 42 DEGREES
EKG P-R INTERVAL: 164 MS
EKG Q-T INTERVAL: 460 MS
EKG QRS DURATION: 140 MS
EKG QTC CALCULATION (BAZETT): 460 MS
EKG R AXIS: 52 DEGREES
EKG T AXIS: 41 DEGREES
EKG VENTRICULAR RATE: 60 BPM
EOSINOPHIL # BLD: 0.1 K/UL (ref 0–0.4)
EOSINOPHILS RELATIVE PERCENT: 2 % (ref 1–4)
EPI CELLS #/AREA URNS HPF: ABNORMAL /HPF (ref 0–5)
ERYTHROCYTE [DISTWIDTH] IN BLOOD BY AUTOMATED COUNT: 12.5 % (ref 12.5–15.4)
GFR, ESTIMATED: 76 ML/MIN/1.73M2
GLUCOSE SERPL-MCNC: 109 MG/DL (ref 74–99)
GLUCOSE UR STRIP-MCNC: NEGATIVE MG/DL
HCT VFR BLD AUTO: 38.9 % (ref 41–53)
HGB BLD-MCNC: 13.1 G/DL (ref 13.5–17.5)
HGB UR QL STRIP.AUTO: NEGATIVE
INR PPP: 1.5 (ref 0.9–1.2)
KETONES UR STRIP-MCNC: ABNORMAL MG/DL
LACTATE BLDV-SCNC: 1.3 MMOL/L (ref 0.5–2.2)
LEUKOCYTE ESTERASE UR QL STRIP: NEGATIVE
LIPASE SERPL-CCNC: 32 U/L (ref 13–60)
LYMPHOCYTES NFR BLD: 0.66 K/UL (ref 1–4.8)
LYMPHOCYTES RELATIVE PERCENT: 15 % (ref 24–44)
MCH RBC QN AUTO: 30.5 PG (ref 26–34)
MCHC RBC AUTO-ENTMCNC: 33.7 G/DL (ref 31–37)
MCV RBC AUTO: 90.7 FL (ref 80–100)
MONOCYTES NFR BLD: 0.41 K/UL (ref 0.1–1.2)
MONOCYTES NFR BLD: 9 % (ref 2–11)
NEUTROPHILS NFR BLD: 74 % (ref 36–66)
NEUTS SEG NFR BLD: 3.33 K/UL (ref 1.8–7.7)
NITRITE UR QL STRIP: NEGATIVE
PARTIAL THROMBOPLASTIN TIME: 37.6 SEC (ref 24–36)
PH UR STRIP: 6 [PH] (ref 5–8)
PLATELET # BLD AUTO: 191 K/UL (ref 140–450)
PMV BLD AUTO: 9.8 FL (ref 8–14)
POTASSIUM SERPL-SCNC: 4.3 MMOL/L (ref 3.7–5.3)
PROT SERPL-MCNC: 6.8 G/DL (ref 6.6–8.7)
PROT UR STRIP-MCNC: NEGATIVE MG/DL
PROTHROMBIN TIME: 18.4 SEC (ref 11.8–14.6)
RBC # BLD AUTO: 4.29 M/UL (ref 4.5–5.9)
RBC #/AREA URNS HPF: ABNORMAL /HPF (ref 0–2)
SODIUM SERPL-SCNC: 140 MMOL/L (ref 136–145)
SP GR UR STRIP: 1.01 (ref 1–1.03)
TROPONIN I SERPL HS-MCNC: 25 NG/L (ref 0–22)
UROBILINOGEN UR STRIP-ACNC: NORMAL EU/DL (ref 0–1)
WBC #/AREA URNS HPF: ABNORMAL /HPF (ref 0–5)
WBC OTHER # BLD: 4.5 K/UL (ref 3.5–11)

## 2025-06-23 PROCEDURE — 2500000003 HC RX 250 WO HCPCS: Performed by: EMERGENCY MEDICINE

## 2025-06-23 PROCEDURE — 71275 CT ANGIOGRAPHY CHEST: CPT

## 2025-06-23 PROCEDURE — 6360000002 HC RX W HCPCS: Performed by: EMERGENCY MEDICINE

## 2025-06-23 PROCEDURE — 93005 ELECTROCARDIOGRAM TRACING: CPT | Performed by: EMERGENCY MEDICINE

## 2025-06-23 PROCEDURE — 2580000003 HC RX 258: Performed by: EMERGENCY MEDICINE

## 2025-06-23 PROCEDURE — 6360000004 HC RX CONTRAST MEDICATION: Performed by: EMERGENCY MEDICINE

## 2025-06-23 PROCEDURE — 83605 ASSAY OF LACTIC ACID: CPT

## 2025-06-23 PROCEDURE — 96374 THER/PROPH/DIAG INJ IV PUSH: CPT | Performed by: EMERGENCY MEDICINE

## 2025-06-23 PROCEDURE — 85025 COMPLETE CBC W/AUTO DIFF WBC: CPT

## 2025-06-23 PROCEDURE — 82150 ASSAY OF AMYLASE: CPT

## 2025-06-23 PROCEDURE — 99285 EMERGENCY DEPT VISIT HI MDM: CPT | Performed by: EMERGENCY MEDICINE

## 2025-06-23 PROCEDURE — 85730 THROMBOPLASTIN TIME PARTIAL: CPT

## 2025-06-23 PROCEDURE — 84484 ASSAY OF TROPONIN QUANT: CPT

## 2025-06-23 PROCEDURE — 6370000000 HC RX 637 (ALT 250 FOR IP): Performed by: EMERGENCY MEDICINE

## 2025-06-23 PROCEDURE — 80053 COMPREHEN METABOLIC PANEL: CPT

## 2025-06-23 PROCEDURE — 93010 ELECTROCARDIOGRAM REPORT: CPT | Performed by: INTERNAL MEDICINE

## 2025-06-23 PROCEDURE — 96361 HYDRATE IV INFUSION ADD-ON: CPT | Performed by: EMERGENCY MEDICINE

## 2025-06-23 PROCEDURE — 85610 PROTHROMBIN TIME: CPT

## 2025-06-23 PROCEDURE — 83690 ASSAY OF LIPASE: CPT

## 2025-06-23 PROCEDURE — 81001 URINALYSIS AUTO W/SCOPE: CPT

## 2025-06-23 RX ORDER — SODIUM CHLORIDE 0.9 % (FLUSH) 0.9 %
10 SYRINGE (ML) INJECTION ONCE
Status: COMPLETED | OUTPATIENT
Start: 2025-06-23 | End: 2025-06-23

## 2025-06-23 RX ORDER — CEPHALEXIN 500 MG/1
500 CAPSULE ORAL 4 TIMES DAILY
Qty: 28 CAPSULE | Refills: 0 | Status: SHIPPED | OUTPATIENT
Start: 2025-06-23 | End: 2025-06-30

## 2025-06-23 RX ORDER — IOPAMIDOL 755 MG/ML
100 INJECTION, SOLUTION INTRAVASCULAR
Status: COMPLETED | OUTPATIENT
Start: 2025-06-23 | End: 2025-06-23

## 2025-06-23 RX ORDER — 0.9 % SODIUM CHLORIDE 0.9 %
500 INTRAVENOUS SOLUTION INTRAVENOUS ONCE
Status: COMPLETED | OUTPATIENT
Start: 2025-06-23 | End: 2025-06-23

## 2025-06-23 RX ORDER — MAGNESIUM HYDROXIDE/ALUMINUM HYDROXICE/SIMETHICONE 120; 1200; 1200 MG/30ML; MG/30ML; MG/30ML
30 SUSPENSION ORAL ONCE
Status: COMPLETED | OUTPATIENT
Start: 2025-06-23 | End: 2025-06-23

## 2025-06-23 RX ORDER — 0.9 % SODIUM CHLORIDE 0.9 %
80 INTRAVENOUS SOLUTION INTRAVENOUS ONCE
Status: DISCONTINUED | OUTPATIENT
Start: 2025-06-23 | End: 2025-06-23 | Stop reason: HOSPADM

## 2025-06-23 RX ADMIN — SODIUM CHLORIDE 500 ML: 0.9 INJECTION, SOLUTION INTRAVENOUS at 08:38

## 2025-06-23 RX ADMIN — IOPAMIDOL 100 ML: 755 INJECTION, SOLUTION INTRAVENOUS at 10:03

## 2025-06-23 RX ADMIN — SODIUM CHLORIDE, PRESERVATIVE FREE 10 ML: 5 INJECTION INTRAVENOUS at 10:03

## 2025-06-23 RX ADMIN — ALUMINUM HYDROXIDE, MAGNESIUM HYDROXIDE, AND SIMETHICONE 30 ML: 200; 200; 20 SUSPENSION ORAL at 08:38

## 2025-06-23 RX ADMIN — PANTOPRAZOLE SODIUM 40 MG: 40 INJECTION, POWDER, FOR SOLUTION INTRAVENOUS at 08:39

## 2025-06-23 RX ADMIN — Medication 80 ML: at 10:03

## 2025-06-23 ASSESSMENT — PAIN DESCRIPTION - DESCRIPTORS: DESCRIPTORS: ACHING

## 2025-06-23 ASSESSMENT — PAIN DESCRIPTION - LOCATION: LOCATION: ABDOMEN

## 2025-06-23 ASSESSMENT — PAIN - FUNCTIONAL ASSESSMENT: PAIN_FUNCTIONAL_ASSESSMENT: 0-10

## 2025-06-23 ASSESSMENT — PAIN SCALES - GENERAL: PAINLEVEL_OUTOF10: 3

## 2025-06-23 NOTE — DISCHARGE INSTRUCTIONS
Seen in the specialist at [facility] as soon as possible.  Keflex as directed for UTI.  Return for pain, fever, vomiting, or if worse in any way.    PLEASE RETURN TO THE EMERGENCY DEPARTMENT IMMEDIATELY if your symptoms worsen in anyway or in 8-12 hours if not improved for re-evaluation.  You should immediately return to the ER for symptoms such as increasing pain, bloody stool, fever, a feeling of passing out, light headed, dizziness, chest pain, shortness of breath, persistent nausea and/or vomiting, numbness or weakness to the arms or legs, coolness or color change of the arms or legs.      Take your medication as indicated and prescribed.  If you are given an antibiotic then, make sure you get the prescription filled and take the antibiotics until finished.      Please understand that at this time there is no evidence for a more serious underlying process, but that early in the process of an illness or injury, an emergency department workup can be falsely reassuring.  You should contact your family doctor within the next 24 hours for a follow up appointment    THANK YOU!!!    From [facility] and [facility]    On behalf of the Emergency Department staff at [facility], I would like to thank you for giving us the opportunity to address your health care needs and concerns.    We hope that during your visit, our service was delivered in a professional and caring manner. Please keep [facility] in mind as we walk with you down the path to your own personal wellness.     Please expect an automated text message or email from us so we can ask a few questions about your health and progress. Based on your answers, a clinician may call you back to offer help and instructions.    Please understand that early in the process of an illness or injury, an emergency department workup can be falsely reassuring.  If you notice any worsening, changing or persistent symptoms please call your family

## 2025-06-23 NOTE — ED NOTES
Patient returned from CT up to bathroom, gait steady . Patient states he feels much better after the Maalox

## 2025-06-23 NOTE — ED PROVIDER NOTES
contacting the patient for follow-up.  At this time, I will treat the patient for a UTI which was also discovered.  The patient may eat and drink what he feels comfortable with.  He has no impairment of his hepatic function and no concern for ACS.    Prescription considerations: I have prescribed Keflex for UTI.    Sepsis considered: Not applicable      Critical Care note written: Not applicable      CONSULTS:   1223  Dr. Hendrickson notified via [employer].  1230  Discussed with Dr. Hendrickson.  Needs referral to cancer center with CT surgeon.  1241  [facility] contacted to seek follow-up.  Will call Dr. Arguelles to arrange f/u in proper clinic.  1400  [facility] to request call back from Dr. Arguelles.  1405  Discussed with Dr. Arguelles.  He is breast melanoma service.  Needs to go to HPV service or medical oncology.  Needs GI oncology.  Will page pancreas/liver, Dr. Almazan.  1419  Discussed with Dr. Almazan.  Will give information to care coordinator.    DISPOSITION NOTE:   Return precautions were provided.  The patient is discharged in good condition.    FINAL IMPRESSION:     1. Mass of gastroesophageal junction    2. Urinary tract infection without hematuria, site unspecified    3. 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  Efforts were made to edit the dictations but occasionally words are mis-transcribed.)       Daljit Huynh MD  06/23/25 3438

## 2025-06-25 ENCOUNTER — TELEPHONE (OUTPATIENT)
Age: 80
End: 2025-06-25

## 2025-06-25 NOTE — TELEPHONE ENCOUNTER
Patient called and said he went to the ER on 06/23 for some stomach pain he was having. The pain started on Saturday night after eating and by Monday, it was getting to bad to handle. They did some scans and found a mass. 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 Metastases involving the liver, left adrenal gland, and involving a periaortic lymph node. Area of nodularity in the left lower lobe    periphery which may also represent a metastasis.     They told the patient to either follow up with [facility] or [facility]. [facility] called [facility] and is waiting to hear back from them.    Patient is c/o still having achey stomach feeling. Spoke with [facility] and asked for him to take an antiacid and try that. Try not to lay down after eating.. patient said that they gave him something in the hospital which we found and was Maalox plus that really helped. Gave him the name and said try to find that OTC or any other antiacid

## 2025-06-26 NOTE — TELEPHONE ENCOUNTER
Patient calling wondering what food to stay away from or diet restrictions or instructions? AVS from the ER didn't mention anything about food. Patient has been taking metamucil once a day 3 teaspoons in 8oz glass of water wondering if should stop or cut back? Patient spoke to [facility] today they are reviewing patient's case and will call the patient back to schedule.

## 2025-07-08 ENCOUNTER — HOSPITAL ENCOUNTER (INPATIENT)
Age: 80
LOS: 9 days | Discharge: HOME HEALTH | End: 2025-07-17
Attending: FAMILY MEDICINE | Admitting: FAMILY MEDICINE
Payer: MEDICARE

## 2025-07-08 ENCOUNTER — OFFICE VISIT (OUTPATIENT)
Age: 80
End: 2025-07-08
Payer: MEDICARE

## 2025-07-08 ENCOUNTER — APPOINTMENT (OUTPATIENT)
Dept: CT IMAGING | Age: 80
End: 2025-07-08
Attending: FAMILY MEDICINE
Payer: MEDICARE

## 2025-07-08 VITALS
HEART RATE: 78 BPM | BODY MASS INDEX: 23.74 KG/M2 | SYSTOLIC BLOOD PRESSURE: 104 MMHG | DIASTOLIC BLOOD PRESSURE: 64 MMHG | OXYGEN SATURATION: 100 % | WEIGHT: 185 LBS | HEIGHT: 74 IN

## 2025-07-08 DIAGNOSIS — K31.89 GASTRIC MASS: ICD-10-CM

## 2025-07-08 DIAGNOSIS — C78.7 METASTATIC CANCER TO LIVER (HCC): Primary | ICD-10-CM

## 2025-07-08 DIAGNOSIS — C79.9 METASTASIS FROM GASTRIC CANCER (HCC): Primary | ICD-10-CM

## 2025-07-08 DIAGNOSIS — I25.10 CORONARY ARTERY DISEASE INVOLVING NATIVE CORONARY ARTERY OF NATIVE HEART WITHOUT ANGINA PECTORIS: ICD-10-CM

## 2025-07-08 DIAGNOSIS — I48.0 PAROXYSMAL ATRIAL FIBRILLATION (HCC): ICD-10-CM

## 2025-07-08 DIAGNOSIS — I10 ESSENTIAL HYPERTENSION: ICD-10-CM

## 2025-07-08 DIAGNOSIS — K92.1 MELANOTIC STOOLS: ICD-10-CM

## 2025-07-08 DIAGNOSIS — R63.4 WEIGHT LOSS, UNINTENTIONAL: ICD-10-CM

## 2025-07-08 DIAGNOSIS — C16.9 METASTASIS FROM GASTRIC CANCER (HCC): Primary | ICD-10-CM

## 2025-07-08 DIAGNOSIS — R10.10 PAIN OF UPPER ABDOMEN: ICD-10-CM

## 2025-07-08 PROBLEM — D64.9 ACUTE ANEMIA: Status: ACTIVE | Noted: 2025-07-08

## 2025-07-08 LAB
ALBUMIN SERPL-MCNC: 4 G/DL (ref 3.5–5.2)
ALBUMIN/GLOB SERPL: 1.3 {RATIO} (ref 1–2.5)
ALP SERPL-CCNC: 136 U/L (ref 40–129)
ALT SERPL-CCNC: 18 U/L (ref 10–50)
ANION GAP SERPL CALCULATED.3IONS-SCNC: 11 MMOL/L (ref 9–16)
AST SERPL-CCNC: 52 U/L (ref 10–50)
BASOPHILS # BLD: 0 K/UL (ref 0–0.2)
BASOPHILS NFR BLD: 1 % (ref 0–2)
BILIRUB SERPL-MCNC: 0.7 MG/DL (ref 0–1.2)
BUN SERPL-MCNC: 26 MG/DL (ref 8–23)
CALCIUM SERPL-MCNC: 10.6 MG/DL (ref 8.6–10.4)
CHLORIDE SERPL-SCNC: 100 MMOL/L (ref 98–107)
CO2 SERPL-SCNC: 25 MMOL/L (ref 20–31)
CREAT SERPL-MCNC: 1.1 MG/DL (ref 0.7–1.2)
EOSINOPHIL # BLD: 0.1 K/UL (ref 0–0.4)
EOSINOPHILS RELATIVE PERCENT: 2 % (ref 1–4)
ERYTHROCYTE [DISTWIDTH] IN BLOOD BY AUTOMATED COUNT: 13 % (ref 12.5–15.4)
GFR, ESTIMATED: 67 ML/MIN/1.73M2
GLUCOSE SERPL-MCNC: 101 MG/DL (ref 74–99)
HCT VFR BLD AUTO: 36.8 % (ref 41–53)
HCT VFR BLD AUTO: 37.8 % (ref 41–53)
HGB BLD-MCNC: 12.7 G/DL (ref 13.5–17.5)
HGB BLD-MCNC: 13 G/DL (ref 13.5–17.5)
INR PPP: 1.4 (ref 0.9–1.2)
LYMPHOCYTES NFR BLD: 0.7 K/UL (ref 1–4.8)
LYMPHOCYTES RELATIVE PERCENT: 15 % (ref 24–44)
MCH RBC QN AUTO: 30.1 PG (ref 26–34)
MCHC RBC AUTO-ENTMCNC: 34.5 G/DL (ref 31–37)
MCV RBC AUTO: 87.2 FL (ref 80–100)
MONOCYTES NFR BLD: 0.5 K/UL (ref 0.1–1.2)
MONOCYTES NFR BLD: 10 % (ref 2–11)
NEUTROPHILS NFR BLD: 72 % (ref 36–66)
NEUTS SEG NFR BLD: 3.4 K/UL (ref 1.8–7.7)
PLATELET # BLD AUTO: 221 K/UL (ref 140–450)
PMV BLD AUTO: 8.6 FL (ref 6–12)
POTASSIUM SERPL-SCNC: 4.7 MMOL/L (ref 3.7–5.3)
PROT SERPL-MCNC: 7.2 G/DL (ref 6.6–8.7)
PROTHROMBIN TIME: 17.8 SEC (ref 11.8–14.6)
RBC # BLD AUTO: 4.33 M/UL (ref 4.5–5.9)
SODIUM SERPL-SCNC: 136 MMOL/L (ref 136–145)
TROPONIN I SERPL HS-MCNC: 32 NG/L (ref 0–22)
WBC OTHER # BLD: 4.7 K/UL (ref 3.5–11)

## 2025-07-08 PROCEDURE — G8427 DOCREV CUR MEDS BY ELIG CLIN: HCPCS | Performed by: FAMILY MEDICINE

## 2025-07-08 PROCEDURE — 6360000004 HC RX CONTRAST MEDICATION: Performed by: FAMILY MEDICINE

## 2025-07-08 PROCEDURE — 70470 CT HEAD/BRAIN W/O & W/DYE: CPT

## 2025-07-08 PROCEDURE — 2500000003 HC RX 250 WO HCPCS: Performed by: FAMILY MEDICINE

## 2025-07-08 PROCEDURE — 93005 ELECTROCARDIOGRAM TRACING: CPT | Performed by: FAMILY MEDICINE

## 2025-07-08 PROCEDURE — 85025 COMPLETE CBC W/AUTO DIFF WBC: CPT

## 2025-07-08 PROCEDURE — 2060000000 HC ICU INTERMEDIATE R&B

## 2025-07-08 PROCEDURE — 99215 OFFICE O/P EST HI 40 MIN: CPT | Performed by: FAMILY MEDICINE

## 2025-07-08 PROCEDURE — 85018 HEMOGLOBIN: CPT

## 2025-07-08 PROCEDURE — G8420 CALC BMI NORM PARAMETERS: HCPCS | Performed by: FAMILY MEDICINE

## 2025-07-08 PROCEDURE — 85610 PROTHROMBIN TIME: CPT

## 2025-07-08 PROCEDURE — 6370000000 HC RX 637 (ALT 250 FOR IP): Performed by: FAMILY MEDICINE

## 2025-07-08 PROCEDURE — 2580000003 HC RX 258: Performed by: FAMILY MEDICINE

## 2025-07-08 PROCEDURE — 85014 HEMATOCRIT: CPT

## 2025-07-08 PROCEDURE — 1036F TOBACCO NON-USER: CPT | Performed by: FAMILY MEDICINE

## 2025-07-08 PROCEDURE — 3078F DIAST BP <80 MM HG: CPT | Performed by: FAMILY MEDICINE

## 2025-07-08 PROCEDURE — 84484 ASSAY OF TROPONIN QUANT: CPT

## 2025-07-08 PROCEDURE — 80053 COMPREHEN METABOLIC PANEL: CPT

## 2025-07-08 PROCEDURE — 36415 COLL VENOUS BLD VENIPUNCTURE: CPT

## 2025-07-08 PROCEDURE — 6360000002 HC RX W HCPCS: Performed by: FAMILY MEDICINE

## 2025-07-08 PROCEDURE — 3074F SYST BP LT 130 MM HG: CPT | Performed by: FAMILY MEDICINE

## 2025-07-08 PROCEDURE — 1159F MED LIST DOCD IN RCRD: CPT | Performed by: FAMILY MEDICINE

## 2025-07-08 PROCEDURE — 1123F ACP DISCUSS/DSCN MKR DOCD: CPT | Performed by: FAMILY MEDICINE

## 2025-07-08 RX ORDER — PRIMIDONE 50 MG/1
100 TABLET ORAL 2 TIMES DAILY
Status: DISCONTINUED | OUTPATIENT
Start: 2025-07-08 | End: 2025-07-17 | Stop reason: HOSPADM

## 2025-07-08 RX ORDER — ONDANSETRON 2 MG/ML
4 INJECTION INTRAMUSCULAR; INTRAVENOUS EVERY 6 HOURS PRN
Status: DISCONTINUED | OUTPATIENT
Start: 2025-07-08 | End: 2025-07-15

## 2025-07-08 RX ORDER — 0.9 % SODIUM CHLORIDE 0.9 %
80 INTRAVENOUS SOLUTION INTRAVENOUS ONCE
Status: DISCONTINUED | OUTPATIENT
Start: 2025-07-09 | End: 2025-07-17 | Stop reason: HOSPADM

## 2025-07-08 RX ORDER — SODIUM CHLORIDE 9 MG/ML
INJECTION, SOLUTION INTRAVENOUS CONTINUOUS
Status: ACTIVE | OUTPATIENT
Start: 2025-07-08 | End: 2025-07-09

## 2025-07-08 RX ORDER — PHENOL 1.4 %
1 AEROSOL, SPRAY (ML) MUCOUS MEMBRANE DAILY
COMMUNITY
End: 2025-07-21 | Stop reason: ALTCHOICE

## 2025-07-08 RX ORDER — SODIUM CHLORIDE 0.9 % (FLUSH) 0.9 %
5-40 SYRINGE (ML) INJECTION PRN
Status: DISCONTINUED | OUTPATIENT
Start: 2025-07-08 | End: 2025-07-17 | Stop reason: HOSPADM

## 2025-07-08 RX ORDER — POLYETHYLENE GLYCOL 3350 17 G/17G
17 POWDER, FOR SOLUTION ORAL DAILY PRN
Status: DISCONTINUED | OUTPATIENT
Start: 2025-07-08 | End: 2025-07-17 | Stop reason: HOSPADM

## 2025-07-08 RX ORDER — ONDANSETRON 4 MG/1
4 TABLET, ORALLY DISINTEGRATING ORAL EVERY 8 HOURS PRN
Status: DISCONTINUED | OUTPATIENT
Start: 2025-07-08 | End: 2025-07-15

## 2025-07-08 RX ORDER — LANOLIN ALCOHOL/MO/W.PET/CERES
200 CREAM (GRAM) TOPICAL DAILY
COMMUNITY
End: 2025-07-21 | Stop reason: ALTCHOICE

## 2025-07-08 RX ORDER — METOPROLOL SUCCINATE 50 MG/1
50 TABLET, EXTENDED RELEASE ORAL DAILY
Status: DISCONTINUED | OUTPATIENT
Start: 2025-07-09 | End: 2025-07-12

## 2025-07-08 RX ORDER — TAMSULOSIN HYDROCHLORIDE 0.4 MG/1
0.4 CAPSULE ORAL DAILY
Status: DISCONTINUED | OUTPATIENT
Start: 2025-07-09 | End: 2025-07-17 | Stop reason: HOSPADM

## 2025-07-08 RX ORDER — PRIMIDONE 50 MG/1
50 TABLET ORAL
Status: DISCONTINUED | OUTPATIENT
Start: 2025-07-08 | End: 2025-07-17 | Stop reason: HOSPADM

## 2025-07-08 RX ORDER — MAGNESIUM SULFATE IN WATER 40 MG/ML
2000 INJECTION, SOLUTION INTRAVENOUS PRN
Status: DISCONTINUED | OUTPATIENT
Start: 2025-07-08 | End: 2025-07-17 | Stop reason: HOSPADM

## 2025-07-08 RX ORDER — SODIUM CHLORIDE 0.9 % (FLUSH) 0.9 %
5-40 SYRINGE (ML) INJECTION EVERY 12 HOURS SCHEDULED
Status: DISCONTINUED | OUTPATIENT
Start: 2025-07-08 | End: 2025-07-17 | Stop reason: HOSPADM

## 2025-07-08 RX ORDER — ACETAMINOPHEN 650 MG/1
650 SUPPOSITORY RECTAL EVERY 6 HOURS PRN
Status: DISCONTINUED | OUTPATIENT
Start: 2025-07-08 | End: 2025-07-17 | Stop reason: HOSPADM

## 2025-07-08 RX ORDER — M-VIT,TX,IRON,MINS/CALC/FOLIC 27MG-0.4MG
1 TABLET ORAL DAILY
Status: DISCONTINUED | OUTPATIENT
Start: 2025-07-09 | End: 2025-07-17 | Stop reason: HOSPADM

## 2025-07-08 RX ORDER — FAMOTIDINE 20 MG/1
20 TABLET, FILM COATED ORAL 2 TIMES DAILY
Status: DISCONTINUED | OUTPATIENT
Start: 2025-07-08 | End: 2025-07-17 | Stop reason: HOSPADM

## 2025-07-08 RX ORDER — SODIUM CHLORIDE 0.9 % (FLUSH) 0.9 %
10 SYRINGE (ML) INJECTION PRN
Status: DISCONTINUED | OUTPATIENT
Start: 2025-07-08 | End: 2025-07-17 | Stop reason: HOSPADM

## 2025-07-08 RX ORDER — ATORVASTATIN CALCIUM 40 MG/1
80 TABLET, FILM COATED ORAL NIGHTLY
Status: DISCONTINUED | OUTPATIENT
Start: 2025-07-08 | End: 2025-07-17 | Stop reason: HOSPADM

## 2025-07-08 RX ORDER — IOPAMIDOL 755 MG/ML
75 INJECTION, SOLUTION INTRAVASCULAR
Status: COMPLETED | OUTPATIENT
Start: 2025-07-08 | End: 2025-07-08

## 2025-07-08 RX ORDER — POTASSIUM CHLORIDE 7.45 MG/ML
10 INJECTION INTRAVENOUS PRN
Status: DISCONTINUED | OUTPATIENT
Start: 2025-07-08 | End: 2025-07-17 | Stop reason: HOSPADM

## 2025-07-08 RX ORDER — SODIUM CHLORIDE 9 MG/ML
INJECTION, SOLUTION INTRAVENOUS PRN
Status: DISCONTINUED | OUTPATIENT
Start: 2025-07-08 | End: 2025-07-17 | Stop reason: HOSPADM

## 2025-07-08 RX ORDER — LANOLIN ALCOHOL/MO/W.PET/CERES
200 CREAM (GRAM) TOPICAL DAILY
Status: DISCONTINUED | OUTPATIENT
Start: 2025-07-09 | End: 2025-07-17 | Stop reason: HOSPADM

## 2025-07-08 RX ORDER — POTASSIUM CHLORIDE 1500 MG/1
40 TABLET, EXTENDED RELEASE ORAL PRN
Status: DISCONTINUED | OUTPATIENT
Start: 2025-07-08 | End: 2025-07-17 | Stop reason: HOSPADM

## 2025-07-08 RX ORDER — ACETAMINOPHEN 325 MG/1
650 TABLET ORAL EVERY 6 HOURS PRN
Status: DISCONTINUED | OUTPATIENT
Start: 2025-07-08 | End: 2025-07-17 | Stop reason: HOSPADM

## 2025-07-08 RX ORDER — LATANOPROST 50 UG/ML
1 SOLUTION/ DROPS OPHTHALMIC NIGHTLY
Status: DISCONTINUED | OUTPATIENT
Start: 2025-07-08 | End: 2025-07-17 | Stop reason: HOSPADM

## 2025-07-08 RX ADMIN — ATORVASTATIN CALCIUM 80 MG: 40 TABLET, FILM COATED ORAL at 21:38

## 2025-07-08 RX ADMIN — FAMOTIDINE 20 MG: 20 TABLET, FILM COATED ORAL at 13:08

## 2025-07-08 RX ADMIN — LATANOPROST 1 DROP: 50 SOLUTION OPHTHALMIC at 21:38

## 2025-07-08 RX ADMIN — SODIUM CHLORIDE: 0.9 INJECTION, SOLUTION INTRAVENOUS at 12:59

## 2025-07-08 RX ADMIN — IOPAMIDOL 75 ML: 755 INJECTION, SOLUTION INTRAVENOUS at 23:46

## 2025-07-08 RX ADMIN — FAMOTIDINE 20 MG: 20 TABLET, FILM COATED ORAL at 20:26

## 2025-07-08 RX ADMIN — SODIUM CHLORIDE, PRESERVATIVE FREE 40 MG: 5 INJECTION INTRAVENOUS at 13:07

## 2025-07-08 RX ADMIN — Medication 80 ML: at 23:46

## 2025-07-08 RX ADMIN — PRIMIDONE 50 MG: 50 TABLET ORAL at 21:38

## 2025-07-08 RX ADMIN — Medication 3 MG: at 21:38

## 2025-07-08 RX ADMIN — SODIUM CHLORIDE, PRESERVATIVE FREE 10 ML: 5 INJECTION INTRAVENOUS at 23:46

## 2025-07-08 ASSESSMENT — PAIN SCALES - GENERAL
PAINLEVEL_OUTOF10: 0
PAINLEVEL_OUTOF10: 0

## 2025-07-08 NOTE — PROGRESS NOTES
procedure.  - Continuous monitoring of his heart condition will be conducted during his hospital stay.    4. Fungal nail infection.  - The patient presents with a fungal nail infection.  - He is advised to show this to the oncologist to rule out any relation to his current condition.  - The infection appears to be unrelated to his current symptoms but will be monitored.  - Treatment options for the fungal infection will be discussed with the oncologist.    5. Headaches.  - The patient reports new-onset headaches and kaleidoscope visual phenomenon over the past couple of weeks.  - A brain scan will be ordered to rule out any serious conditions such as ocular migraines or mini-strokes.  - The headaches could be a new symptom related to his current condition.  - Further evaluation will be conducted based on the results of the brain scan.      60 min spent reviewing pt chart, documents, ER labs,imaging results, discussion with family and coordinating care with consultants for admission to hospital.          Reviewed with the patient: current clinical status,medications, activities and diet.     Side effects, adverse effects of the medication prescribed today, as well as treatment plan/ rationale and result expectations have been discussed with the patient who expresses understanding and desires to proceed.    Close follow up to evaluate treatment results and for coordination of care.  I have reviewed the patient's medical history in detail and updated the computerized patient record.    Please note, this report has been partially produced using speech recognition software and may cause  and /or contain errors related to that system including grammar, punctuation and spelling as well as words and phrases that may seem inappropriate. If there are questions or concerns please feel free to contact me to clarify.    The patient (or guardian, if applicable) and other individuals in attendance with the patient were advised

## 2025-07-08 NOTE — PROGRESS NOTES
Spiritual Health History and Assessment/Progress Note  [facility]    (P) Spiritual/Emotional Needs,  ,  ,      Name: Prince Gutierrez MRN: [id number]    Age: 80 y.o.     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 Pt shared about his recently being diagnosed with \"stomach cancer\" Pt shared that he has a a lot of people praying for him. Pt's spouse shared that she is hoping that pt can receive treatments here at [facility] rather than the  of . Pt said that he appreciates all the people praying for him and that he is \"trying to accept\" \"whatever happens\" Writer provided a ministry of presence, active/empathic listening, and prayed with pt and pt's family.    Abbey, Belief, Meaning:   Patient identifies as spiritual and is connected with a abbey tradition or spiritual practice  Family/Friends identify as spiritual and are connected with a abbey tradition or spiritual practice      Importance and Influence:  Patient has spiritual/personal beliefs that influence decisions regarding their health  Family/Friends have spiritual/personal beliefs that influence decisions regarding the patient's health    Community:  Patient feels well-supported. Support system includes: Spouse/Partner, Children, and Friends  Family/Friends feel well-supported. Support system includes: Children    Assessment and Plan of Care:     Patient Interventions include: Facilitated expression of thoughts and feelings, Explored spiritual coping/struggle/distress, and Affirmed coping skills/support systems  Family/Friends Interventions include: Facilitated

## 2025-07-08 NOTE — PLAN OF CARE
Problem: Discharge Planning  Goal: Discharge to home or other facility with appropriate resources  Outcome: Progressing  Flowsheets (Taken 7/8/2025 1127 by Sarai Jackson, RN)  Discharge to home or other facility with appropriate resources:   Identify barriers to discharge with patient and caregiver   Identify discharge learning needs (meds, wound care, etc)   Refer to discharge planning if patient needs post-hospital services based on physician order or complex needs related to functional status, cognitive ability or social support system   Arrange for needed discharge resources and transportation as appropriate     Problem: Safety - Adult  Goal: Free from fall injury  Outcome: Progressing     Problem: ABCDS Injury Assessment  Goal: Absence of physical injury  Outcome: Progressing

## 2025-07-08 NOTE — CONSULTS
ONCOLOGY NOTE    PCP: Mulugeta Bruce MD  Referring Provider: Mulugeta Burce MD    VISIT DIAGNOSIS:  There were no encounter diagnoses.    STAGING:   Cancer Staging   No matching staging information was found for the patient.      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) 07/08/2025    Acute anemia 07/08/2025    Metastatic cancer to liver (HCC) 07/08/2025    Dyslipidemia 01/24/2024    Hypothyroidism 01/24/2024    Hyperglycemia 01/24/2024    Essential hypertension 01/24/2024    Venous insufficiency of both lower extremities 01/24/2024    History of total right hip replacement 01/24/2024    Essential tremor 01/24/2024    Coronary artery disease involving native coronary artery of native heart without angina pectoris 01/24/2024    Paroxysmal atrial fibrillation (HCC) 01/24/2024    Osteoarthritis of right hip, unspecified osteoarthritis type 10/25/2023       SUBJECTIVE/HPI:  Prince Gutierrez is a very pleasant 80 y.o. male who is admitted to [facility].  Oncology consultation was kindly requested in lieu of his new radiographic findings highly suggestive of advanced malignancy.  Patient was seen and assessed at bedside.  Wife, son and daughter were all present at bedside.    About 2 weeks ago or so, the patient presented to the emergency department with abdominal pain which occurred after eating.  Prior to these events, he was in relatively good health.  His workup at the emergency department included a CT scan.  The CT scan was grossly abnormal.  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  The patient was discharged and referred to [facility].  He was seen in the emergency department a week later but was discharged with outpatient follow-up.  Currently, he is scheduled for percutaneous liver biopsy at [facility] on 7/15/2025 at noon.  He is scheduled to meet with one of their

## 2025-07-09 ENCOUNTER — APPOINTMENT (OUTPATIENT)
Dept: MRI IMAGING | Age: 80
End: 2025-07-09
Attending: FAMILY MEDICINE
Payer: MEDICARE

## 2025-07-09 ENCOUNTER — ANESTHESIA (OUTPATIENT)
Dept: OPERATING ROOM | Age: 80
End: 2025-07-09
Payer: MEDICARE

## 2025-07-09 ENCOUNTER — ANESTHESIA EVENT (OUTPATIENT)
Dept: OPERATING ROOM | Age: 80
End: 2025-07-09
Payer: MEDICARE

## 2025-07-09 PROBLEM — K31.89 GASTRIC MASS: Status: ACTIVE | Noted: 2025-07-09

## 2025-07-09 LAB
AFP SERPL-MCNC: 2.6 UG/L
ALBUMIN SERPL-MCNC: 3.7 G/DL (ref 3.5–5.2)
ALBUMIN/GLOB SERPL: 1.3 {RATIO} (ref 1–2.5)
ALP SERPL-CCNC: 127 U/L (ref 40–129)
ALT SERPL-CCNC: 16 U/L (ref 10–50)
ANION GAP SERPL CALCULATED.3IONS-SCNC: 12 MMOL/L (ref 9–16)
AST SERPL-CCNC: 52 U/L (ref 10–50)
BASOPHILS # BLD: 0 K/UL (ref 0–0.2)
BASOPHILS NFR BLD: 1 % (ref 0–2)
BILIRUB SERPL-MCNC: 0.7 MG/DL (ref 0–1.2)
BUN SERPL-MCNC: 21 MG/DL (ref 8–23)
CALCIUM SERPL-MCNC: 9.9 MG/DL (ref 8.6–10.4)
CANCER AG19-9 SERPL IA-ACNC: 5 U/ML (ref 0–35)
CEA SERPL-MCNC: 3.4 NG/ML (ref 0–3.8)
CHLORIDE SERPL-SCNC: 100 MMOL/L (ref 98–107)
CO2 SERPL-SCNC: 22 MMOL/L (ref 20–31)
CREAT SERPL-MCNC: 0.8 MG/DL (ref 0.7–1.2)
EOSINOPHIL # BLD: 0.1 K/UL (ref 0–0.4)
EOSINOPHILS RELATIVE PERCENT: 3 % (ref 1–4)
ERYTHROCYTE [DISTWIDTH] IN BLOOD BY AUTOMATED COUNT: 12.8 % (ref 12.5–15.4)
FERRITIN SERPL-MCNC: 834 NG/ML
FOLATE SERPL-MCNC: 24.7 NG/ML (ref 4.8–24.2)
GFR, ESTIMATED: 89 ML/MIN/1.73M2
GLUCOSE SERPL-MCNC: 96 MG/DL (ref 74–99)
HCT VFR BLD AUTO: 34.7 % (ref 41–53)
HCT VFR BLD AUTO: 34.8 % (ref 41–53)
HCT VFR BLD AUTO: 37.5 % (ref 41–53)
HGB BLD-MCNC: 12.2 G/DL (ref 13.5–17.5)
HGB BLD-MCNC: 12.4 G/DL (ref 13.5–17.5)
HGB BLD-MCNC: 12.8 G/DL (ref 13.5–17.5)
IRON SATN MFR SERPL: 27 % (ref 20–55)
IRON SERPL-MCNC: 38 UG/DL (ref 61–157)
LYMPHOCYTES NFR BLD: 0.7 K/UL (ref 1–4.8)
LYMPHOCYTES RELATIVE PERCENT: 19 % (ref 24–44)
MCH RBC QN AUTO: 30.6 PG (ref 26–34)
MCHC RBC AUTO-ENTMCNC: 35.1 G/DL (ref 31–37)
MCV RBC AUTO: 87.2 FL (ref 80–100)
MONOCYTES NFR BLD: 0.4 K/UL (ref 0.1–1.2)
MONOCYTES NFR BLD: 11 % (ref 2–11)
NEUTROPHILS NFR BLD: 66 % (ref 36–66)
NEUTS SEG NFR BLD: 2.6 K/UL (ref 1.8–7.7)
PLATELET # BLD AUTO: 196 K/UL (ref 140–450)
PMV BLD AUTO: 7.7 FL (ref 6–12)
POTASSIUM SERPL-SCNC: 4.2 MMOL/L (ref 3.7–5.3)
PROT SERPL-MCNC: 6.6 G/DL (ref 6.6–8.7)
RBC # BLD AUTO: 3.99 M/UL (ref 4.5–5.9)
SODIUM SERPL-SCNC: 134 MMOL/L (ref 136–145)
TIBC SERPL-MCNC: 142 UG/DL (ref 250–450)
TRANSFERRIN SERPL-MCNC: 118 MG/DL (ref 200–360)
UNSATURATED IRON BINDING CAPACITY: 104 UG/DL (ref 112–347)
VIT B12 SERPL-MCNC: <150 PG/ML (ref 232–1245)
WBC OTHER # BLD: 3.9 K/UL (ref 3.5–11)

## 2025-07-09 PROCEDURE — 82607 VITAMIN B-12: CPT

## 2025-07-09 PROCEDURE — 2500000003 HC RX 250 WO HCPCS: Performed by: FAMILY MEDICINE

## 2025-07-09 PROCEDURE — 2500000003 HC RX 250 WO HCPCS: Performed by: ANESTHESIOLOGY

## 2025-07-09 PROCEDURE — 82378 CARCINOEMBRYONIC ANTIGEN: CPT

## 2025-07-09 PROCEDURE — A9579 GAD-BASE MR CONTRAST NOS,1ML: HCPCS | Performed by: NURSE PRACTITIONER

## 2025-07-09 PROCEDURE — 43239 EGD BIOPSY SINGLE/MULTIPLE: CPT | Performed by: INTERNAL MEDICINE

## 2025-07-09 PROCEDURE — 2060000000 HC ICU INTERMEDIATE R&B

## 2025-07-09 PROCEDURE — 82728 ASSAY OF FERRITIN: CPT

## 2025-07-09 PROCEDURE — 88342 IMHCHEM/IMCYTCHM 1ST ANTB: CPT

## 2025-07-09 PROCEDURE — 85018 HEMOGLOBIN: CPT

## 2025-07-09 PROCEDURE — 6360000002 HC RX W HCPCS: Performed by: NURSE PRACTITIONER

## 2025-07-09 PROCEDURE — 6360000002 HC RX W HCPCS: Performed by: NURSE ANESTHETIST, CERTIFIED REGISTERED

## 2025-07-09 PROCEDURE — 85025 COMPLETE CBC W/AUTO DIFF WBC: CPT

## 2025-07-09 PROCEDURE — 6370000000 HC RX 637 (ALT 250 FOR IP): Performed by: FAMILY MEDICINE

## 2025-07-09 PROCEDURE — 2580000003 HC RX 258: Performed by: ANESTHESIOLOGY

## 2025-07-09 PROCEDURE — 6360000002 HC RX W HCPCS: Performed by: INTERNAL MEDICINE

## 2025-07-09 PROCEDURE — 6370000000 HC RX 637 (ALT 250 FOR IP): Performed by: INTERNAL MEDICINE

## 2025-07-09 PROCEDURE — 84466 ASSAY OF TRANSFERRIN: CPT

## 2025-07-09 PROCEDURE — 36415 COLL VENOUS BLD VENIPUNCTURE: CPT

## 2025-07-09 PROCEDURE — 6360000002 HC RX W HCPCS: Performed by: ANESTHESIOLOGY

## 2025-07-09 PROCEDURE — 0DB68ZX EXCISION OF STOMACH, VIA NATURAL OR ARTIFICIAL OPENING ENDOSCOPIC, DIAGNOSTIC: ICD-10-PCS | Performed by: INTERNAL MEDICINE

## 2025-07-09 PROCEDURE — 2500000003 HC RX 250 WO HCPCS: Performed by: NURSE PRACTITIONER

## 2025-07-09 PROCEDURE — 97535 SELF CARE MNGMENT TRAINING: CPT

## 2025-07-09 PROCEDURE — 3609012400 HC EGD TRANSORAL BIOPSY SINGLE/MULTIPLE: Performed by: INTERNAL MEDICINE

## 2025-07-09 PROCEDURE — 97161 PT EVAL LOW COMPLEX 20 MIN: CPT

## 2025-07-09 PROCEDURE — 7100000000 HC PACU RECOVERY - FIRST 15 MIN: Performed by: INTERNAL MEDICINE

## 2025-07-09 PROCEDURE — 6360000002 HC RX W HCPCS: Performed by: FAMILY MEDICINE

## 2025-07-09 PROCEDURE — 7100000001 HC PACU RECOVERY - ADDTL 15 MIN: Performed by: INTERNAL MEDICINE

## 2025-07-09 PROCEDURE — 82105 ALPHA-FETOPROTEIN SERUM: CPT

## 2025-07-09 PROCEDURE — 82746 ASSAY OF FOLIC ACID SERUM: CPT

## 2025-07-09 PROCEDURE — 83550 IRON BINDING TEST: CPT

## 2025-07-09 PROCEDURE — 3700000000 HC ANESTHESIA ATTENDED CARE: Performed by: INTERNAL MEDICINE

## 2025-07-09 PROCEDURE — 99223 1ST HOSP IP/OBS HIGH 75: CPT | Performed by: STUDENT IN AN ORGANIZED HEALTH CARE EDUCATION/TRAINING PROGRAM

## 2025-07-09 PROCEDURE — 6360000002 HC RX W HCPCS: Performed by: STUDENT IN AN ORGANIZED HEALTH CARE EDUCATION/TRAINING PROGRAM

## 2025-07-09 PROCEDURE — 85014 HEMATOCRIT: CPT

## 2025-07-09 PROCEDURE — 99222 1ST HOSP IP/OBS MODERATE 55: CPT | Performed by: INTERNAL MEDICINE

## 2025-07-09 PROCEDURE — 83540 ASSAY OF IRON: CPT

## 2025-07-09 PROCEDURE — 97116 GAIT TRAINING THERAPY: CPT

## 2025-07-09 PROCEDURE — 2709999900 HC NON-CHARGEABLE SUPPLY: Performed by: INTERNAL MEDICINE

## 2025-07-09 PROCEDURE — 86301 IMMUNOASSAY TUMOR CA 19-9: CPT

## 2025-07-09 PROCEDURE — 6360000004 HC RX CONTRAST MEDICATION: Performed by: NURSE PRACTITIONER

## 2025-07-09 PROCEDURE — APPNB60 APP NON BILLABLE TIME 46-60 MINS: Performed by: NURSE PRACTITIONER

## 2025-07-09 PROCEDURE — 70553 MRI BRAIN STEM W/O & W/DYE: CPT

## 2025-07-09 PROCEDURE — 97166 OT EVAL MOD COMPLEX 45 MIN: CPT

## 2025-07-09 PROCEDURE — 2580000003 HC RX 258: Performed by: FAMILY MEDICINE

## 2025-07-09 PROCEDURE — 80053 COMPREHEN METABOLIC PANEL: CPT

## 2025-07-09 PROCEDURE — 88341 IMHCHEM/IMCYTCHM EA ADD ANTB: CPT

## 2025-07-09 PROCEDURE — 88305 TISSUE EXAM BY PATHOLOGIST: CPT

## 2025-07-09 RX ORDER — IPRATROPIUM BROMIDE AND ALBUTEROL SULFATE 2.5; .5 MG/3ML; MG/3ML
1 SOLUTION RESPIRATORY (INHALATION)
Status: DISCONTINUED | OUTPATIENT
Start: 2025-07-09 | End: 2025-07-10 | Stop reason: HOSPADM

## 2025-07-09 RX ORDER — OXYCODONE AND ACETAMINOPHEN 5; 325 MG/1; MG/1
2 TABLET ORAL
Status: DISCONTINUED | OUTPATIENT
Start: 2025-07-09 | End: 2025-07-10 | Stop reason: HOSPADM

## 2025-07-09 RX ORDER — SODIUM CHLORIDE 0.9 % (FLUSH) 0.9 %
5-40 SYRINGE (ML) INJECTION EVERY 12 HOURS SCHEDULED
Status: DISCONTINUED | OUTPATIENT
Start: 2025-07-09 | End: 2025-07-10 | Stop reason: HOSPADM

## 2025-07-09 RX ORDER — MORPHINE SULFATE 2 MG/ML
2 INJECTION, SOLUTION INTRAMUSCULAR; INTRAVENOUS EVERY 5 MIN PRN
Status: DISCONTINUED | OUTPATIENT
Start: 2025-07-09 | End: 2025-07-10 | Stop reason: HOSPADM

## 2025-07-09 RX ORDER — MIDAZOLAM HYDROCHLORIDE 2 MG/2ML
2 INJECTION, SOLUTION INTRAMUSCULAR; INTRAVENOUS
Status: DISCONTINUED | OUTPATIENT
Start: 2025-07-09 | End: 2025-07-10 | Stop reason: SDUPTHER

## 2025-07-09 RX ORDER — ONDANSETRON 2 MG/ML
4 INJECTION INTRAMUSCULAR; INTRAVENOUS
Status: DISCONTINUED | OUTPATIENT
Start: 2025-07-09 | End: 2025-07-10 | Stop reason: SDUPTHER

## 2025-07-09 RX ORDER — SODIUM CHLORIDE 9 MG/ML
INJECTION, SOLUTION INTRAVENOUS PRN
Status: DISCONTINUED | OUTPATIENT
Start: 2025-07-09 | End: 2025-07-10 | Stop reason: HOSPADM

## 2025-07-09 RX ORDER — DIPHENHYDRAMINE HYDROCHLORIDE 50 MG/ML
12.5 INJECTION, SOLUTION INTRAMUSCULAR; INTRAVENOUS
Status: DISCONTINUED | OUTPATIENT
Start: 2025-07-09 | End: 2025-07-10 | Stop reason: SDUPTHER

## 2025-07-09 RX ORDER — MEPERIDINE HYDROCHLORIDE 50 MG/ML
12.5 INJECTION INTRAMUSCULAR; INTRAVENOUS; SUBCUTANEOUS EVERY 5 MIN PRN
Status: DISCONTINUED | OUTPATIENT
Start: 2025-07-09 | End: 2025-07-10 | Stop reason: HOSPADM

## 2025-07-09 RX ORDER — OXYCODONE AND ACETAMINOPHEN 5; 325 MG/1; MG/1
1 TABLET ORAL
Status: DISCONTINUED | OUTPATIENT
Start: 2025-07-09 | End: 2025-07-10 | Stop reason: HOSPADM

## 2025-07-09 RX ORDER — SODIUM CHLORIDE 0.9 % (FLUSH) 0.9 %
5-40 SYRINGE (ML) INJECTION PRN
Status: DISCONTINUED | OUTPATIENT
Start: 2025-07-09 | End: 2025-07-09 | Stop reason: HOSPADM

## 2025-07-09 RX ORDER — GLYCOPYRROLATE 0.2 MG/ML
0.4 INJECTION INTRAMUSCULAR; INTRAVENOUS ONCE
Status: DISCONTINUED | OUTPATIENT
Start: 2025-07-09 | End: 2025-07-10 | Stop reason: HOSPADM

## 2025-07-09 RX ORDER — DEXAMETHASONE SODIUM PHOSPHATE 10 MG/ML
4 INJECTION, SOLUTION INTRA-ARTICULAR; INTRALESIONAL; INTRAMUSCULAR; INTRAVENOUS; SOFT TISSUE EVERY 8 HOURS
Status: DISCONTINUED | OUTPATIENT
Start: 2025-07-09 | End: 2025-07-11

## 2025-07-09 RX ORDER — SODIUM CHLORIDE 0.9 % (FLUSH) 0.9 %
5-40 SYRINGE (ML) INJECTION EVERY 12 HOURS SCHEDULED
Status: DISCONTINUED | OUTPATIENT
Start: 2025-07-09 | End: 2025-07-09 | Stop reason: HOSPADM

## 2025-07-09 RX ORDER — GADOTERIDOL 279.3 MG/ML
17 INJECTION INTRAVENOUS
Status: COMPLETED | OUTPATIENT
Start: 2025-07-09 | End: 2025-07-09

## 2025-07-09 RX ORDER — SODIUM CHLORIDE 9 MG/ML
INJECTION, SOLUTION INTRAVENOUS PRN
Status: DISCONTINUED | OUTPATIENT
Start: 2025-07-09 | End: 2025-07-09 | Stop reason: HOSPADM

## 2025-07-09 RX ORDER — PROPOFOL 10 MG/ML
INJECTION, EMULSION INTRAVENOUS
Status: DISCONTINUED | OUTPATIENT
Start: 2025-07-09 | End: 2025-07-09 | Stop reason: SDUPTHER

## 2025-07-09 RX ORDER — METOCLOPRAMIDE HYDROCHLORIDE 5 MG/ML
10 INJECTION INTRAMUSCULAR; INTRAVENOUS
Status: DISCONTINUED | OUTPATIENT
Start: 2025-07-09 | End: 2025-07-10 | Stop reason: SDUPTHER

## 2025-07-09 RX ORDER — LABETALOL HYDROCHLORIDE 5 MG/ML
10 INJECTION, SOLUTION INTRAVENOUS
Status: DISCONTINUED | OUTPATIENT
Start: 2025-07-09 | End: 2025-07-10 | Stop reason: SDUPTHER

## 2025-07-09 RX ORDER — SODIUM CHLORIDE, SODIUM LACTATE, POTASSIUM CHLORIDE, CALCIUM CHLORIDE 600; 310; 30; 20 MG/100ML; MG/100ML; MG/100ML; MG/100ML
INJECTION, SOLUTION INTRAVENOUS CONTINUOUS
Status: DISCONTINUED | OUTPATIENT
Start: 2025-07-09 | End: 2025-07-09 | Stop reason: HOSPADM

## 2025-07-09 RX ORDER — SODIUM CHLORIDE 0.9 % (FLUSH) 0.9 %
5-40 SYRINGE (ML) INJECTION PRN
Status: DISCONTINUED | OUTPATIENT
Start: 2025-07-09 | End: 2025-07-10 | Stop reason: HOSPADM

## 2025-07-09 RX ORDER — MORPHINE SULFATE 2 MG/ML
2 INJECTION, SOLUTION INTRAMUSCULAR; INTRAVENOUS EVERY 4 HOURS PRN
Status: DISCONTINUED | OUTPATIENT
Start: 2025-07-09 | End: 2025-07-10

## 2025-07-09 RX ORDER — NALOXONE HYDROCHLORIDE 1 MG/ML
0.4 INJECTION INTRAMUSCULAR; INTRAVENOUS; SUBCUTANEOUS PRN
Status: DISCONTINUED | OUTPATIENT
Start: 2025-07-09 | End: 2025-07-17 | Stop reason: HOSPADM

## 2025-07-09 RX ORDER — HYDRALAZINE HYDROCHLORIDE 20 MG/ML
10 INJECTION INTRAMUSCULAR; INTRAVENOUS
Status: DISCONTINUED | OUTPATIENT
Start: 2025-07-09 | End: 2025-07-10 | Stop reason: SDUPTHER

## 2025-07-09 RX ORDER — LIDOCAINE HYDROCHLORIDE 10 MG/ML
INJECTION, SOLUTION EPIDURAL; INFILTRATION; INTRACAUDAL; PERINEURAL
Status: DISCONTINUED | OUTPATIENT
Start: 2025-07-09 | End: 2025-07-09 | Stop reason: SDUPTHER

## 2025-07-09 RX ORDER — LIDOCAINE HYDROCHLORIDE 10 MG/ML
1 INJECTION, SOLUTION EPIDURAL; INFILTRATION; INTRACAUDAL; PERINEURAL
Status: DISCONTINUED | OUTPATIENT
Start: 2025-07-09 | End: 2025-07-09 | Stop reason: HOSPADM

## 2025-07-09 RX ORDER — SODIUM CHLORIDE 0.9 % (FLUSH) 0.9 %
10 SYRINGE (ML) INJECTION ONCE
Status: COMPLETED | OUTPATIENT
Start: 2025-07-09 | End: 2025-07-09

## 2025-07-09 RX ADMIN — LIDOCAINE HYDROCHLORIDE 100 MG: 10 INJECTION, SOLUTION EPIDURAL; INFILTRATION; INTRACAUDAL; PERINEURAL at 14:12

## 2025-07-09 RX ADMIN — PRIMIDONE 50 MG: 50 TABLET ORAL at 20:51

## 2025-07-09 RX ADMIN — Medication 3 MG: at 20:51

## 2025-07-09 RX ADMIN — Medication 1 TABLET: at 09:54

## 2025-07-09 RX ADMIN — SODIUM CHLORIDE, PRESERVATIVE FREE 10 ML: 5 INJECTION INTRAVENOUS at 11:16

## 2025-07-09 RX ADMIN — PROPOFOL 20 MG: 10 INJECTION, EMULSION INTRAVENOUS at 14:14

## 2025-07-09 RX ADMIN — ATORVASTATIN CALCIUM 80 MG: 40 TABLET, FILM COATED ORAL at 20:50

## 2025-07-09 RX ADMIN — MAGNESIUM GLUCONATE 500 MG ORAL TABLET 200 MG: 500 TABLET ORAL at 09:54

## 2025-07-09 RX ADMIN — SODIUM CHLORIDE, PRESERVATIVE FREE 10 ML: 5 INJECTION INTRAVENOUS at 12:57

## 2025-07-09 RX ADMIN — SODIUM CHLORIDE, PRESERVATIVE FREE 40 MG: 5 INJECTION INTRAVENOUS at 09:54

## 2025-07-09 RX ADMIN — DEXAMETHASONE SODIUM PHOSPHATE 4 MG: 10 INJECTION INTRAMUSCULAR; INTRAVENOUS at 17:15

## 2025-07-09 RX ADMIN — METOPROLOL SUCCINATE 50 MG: 50 TABLET, EXTENDED RELEASE ORAL at 09:54

## 2025-07-09 RX ADMIN — GADOTERIDOL 17 ML: 279.3 INJECTION, SOLUTION INTRAVENOUS at 11:15

## 2025-07-09 RX ADMIN — PRIMIDONE 100 MG: 50 TABLET ORAL at 09:54

## 2025-07-09 RX ADMIN — HYDROMORPHONE HYDROCHLORIDE 0.5 MG: 1 INJECTION, SOLUTION INTRAMUSCULAR; INTRAVENOUS; SUBCUTANEOUS at 23:19

## 2025-07-09 RX ADMIN — SODIUM CHLORIDE, PRESERVATIVE FREE 10 ML: 5 INJECTION INTRAVENOUS at 20:51

## 2025-07-09 RX ADMIN — TAMSULOSIN HYDROCHLORIDE 0.4 MG: 0.4 CAPSULE ORAL at 09:54

## 2025-07-09 RX ADMIN — LATANOPROST 1 DROP: 50 SOLUTION OPHTHALMIC at 20:51

## 2025-07-09 RX ADMIN — SODIUM CHLORIDE: 0.9 INJECTION, SOLUTION INTRAVENOUS at 01:22

## 2025-07-09 RX ADMIN — MORPHINE SULFATE 2 MG: 2 INJECTION, SOLUTION INTRAMUSCULAR; INTRAVENOUS at 17:36

## 2025-07-09 RX ADMIN — MORPHINE SULFATE 2 MG: 2 INJECTION, SOLUTION INTRAMUSCULAR; INTRAVENOUS at 05:08

## 2025-07-09 RX ADMIN — FAMOTIDINE 20 MG: 20 TABLET, FILM COATED ORAL at 09:54

## 2025-07-09 RX ADMIN — SODIUM CHLORIDE, SODIUM LACTATE, POTASSIUM CHLORIDE, AND CALCIUM CHLORIDE: .6; .31; .03; .02 INJECTION, SOLUTION INTRAVENOUS at 12:59

## 2025-07-09 RX ADMIN — SODIUM CHLORIDE, PRESERVATIVE FREE 10 ML: 5 INJECTION INTRAVENOUS at 09:54

## 2025-07-09 RX ADMIN — FAMOTIDINE 20 MG: 20 TABLET, FILM COATED ORAL at 20:51

## 2025-07-09 RX ADMIN — PROPOFOL 50 MG: 10 INJECTION, EMULSION INTRAVENOUS at 14:12

## 2025-07-09 RX ADMIN — DEXAMETHASONE SODIUM PHOSPHATE 4 MG: 10 INJECTION INTRAMUSCULAR; INTRAVENOUS at 09:54

## 2025-07-09 ASSESSMENT — PAIN DESCRIPTION - LOCATION
LOCATION: ABDOMEN

## 2025-07-09 ASSESSMENT — PAIN SCALES - GENERAL
PAINLEVEL_OUTOF10: 0
PAINLEVEL_OUTOF10: 7
PAINLEVEL_OUTOF10: 0
PAINLEVEL_OUTOF10: 7
PAINLEVEL_OUTOF10: 10
PAINLEVEL_OUTOF10: 10
PAINLEVEL_OUTOF10: 0

## 2025-07-09 ASSESSMENT — PAIN DESCRIPTION - DESCRIPTORS
DESCRIPTORS: BURNING
DESCRIPTORS: BURNING;CRAMPING
DESCRIPTORS: SHARP

## 2025-07-09 ASSESSMENT — PAIN - FUNCTIONAL ASSESSMENT
PAIN_FUNCTIONAL_ASSESSMENT: 0-10
PAIN_FUNCTIONAL_ASSESSMENT: NONE - DENIES PAIN
PAIN_FUNCTIONAL_ASSESSMENT: ACTIVITIES ARE NOT PREVENTED

## 2025-07-09 ASSESSMENT — PAIN DESCRIPTION - ORIENTATION
ORIENTATION: MID
ORIENTATION: LOWER

## 2025-07-09 NOTE — PROGRESS NOTES
Physical Therapy  Facility/Department: [address]   Physical Therapy Initial Evaluation    Patient Name: Prince Gutierrez        MRN: [id number]    : 1945    Date of Service: 2025    chief complaint: SOB and upper abdominal pain    Past Medical History:  has a past medical history of Arthritis, Atrial fibrillation (HCC), BPH (benign prostatic hyperplasia), CAD (coronary artery disease), Essential tremor, Glaucoma, Hyperlipidemia, Hypertension, Hypothyroid, Scoliosis, TIA (transient ischemic attack), and Under care of team.  Past Surgical History:  has a past surgical history that includes Coronary artery bypass graft (2022); joint replacement (Left); Tonsillectomy; Coronary angioplasty with stent (2022); Cholecystectomy; Colonoscopy (2015); Total hip arthroplasty (Right, 10/25/2023); Total hip arthroplasty (Right, 10/25/2023); Cardioversion (2022); Eye surgery (Right, 2024); and Cataract extraction (Right, 2024).    Discharge Recommendations  Discharge Recommendations: Patient would benefit from continued therapy after discharge  PT Equipment Recommendations  Equipment Needed: No  Other: has RW    Assessment  Body Structures, Functions, Activity Limitations Requiring Skilled Therapeutic Intervention: Decreased functional mobility , Decreased balance, Decreased safe awareness, Decreased endurance  Assessment: The patient was admitted due to SOB and upper abdominal pain. At baseline, the patient lives wife and requires no assistance with ADLs and functional mobility with gait no device, but in last few weeks began using RW per daughter's (who is a PT) request as pt decreased appetite and loss of weight. The patient resides in 2 story home w/ basement with bedroom on 2nd floor but pt sleeping in recliner due to abdominal symptoms; does have access to 1st floor bed. During PT evaluation, the patient was able to perform bed mobility with SBA, transfers and gait with CGA 200ft with RW

## 2025-07-09 NOTE — H&P
[facility]   IN-PATIENT [facility]     HISTORY AND PHYSICAL EXAMINATION            Date:   7/9/2025  Patient name:  Prince Gutierrez  Date of admission:  7/8/2025 11:20 AM  MRN:   [id number]  Account:  [id number]  YOB: 1945  PCP:    Mulugeta Bruce MD  Room:   [address] [place] [address]  Code Status:    Full Code      History Obtained From:     patient    History of Present Illness:     Prince Gutierrez is a 80 y.o. Non- / non  male who presents with No chief complaint on file.   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    This is a very pleasant 80-year-old with a history of A-fib on Eliquis, BPH, CAD, hyperlipidemia, hypertension.  He was seen as an outpatient on 7/8/2025, he had some recent imaging in the ER that showed a suspicious lesion of the GE junction.  He had plans to get a biopsy at [facility] later on this month but he has been having some kaleidoscope vision on the left visual field for the past 2 weeks and was overall not feeling well so he was directly admitted to the hospital for suspected metastatic cancer and concerns for possible GI bleed.  His Eliquis was held as well.  In the hospital he had a CT scan of the head that showed a metastatic lesion and then an MRI brain that showed at least 6 metastatic lesions with microhemorrhages noted, he did have a consult from GI and heme-onc.  He had an EGD today with the biopsy taken but could not get a liver biopsy until he is off his Eliquis for longer.   Overall patient seems to be in good spirits and has no specific questions he had some belly pain last night which was addressed with morphine and overall stable and as well as could be today.    Review of Systems   Constitutional:  Negative for chills and fever.   HENT:  Negative for hearing loss, postnasal drip, rhinorrhea and sore throat.    Eyes:  Negative for visual disturbance.   Respiratory:

## 2025-07-09 NOTE — ANESTHESIA POSTPROCEDURE EVALUATION
Department of Anesthesiology  Postprocedure Note    Patient: Prince Gutierrez  MRN: [id number]  YOB: 1945  Date of evaluation: 7/9/2025    Procedure Summary       Date: 07/09/25 Room / Location: [facility] PROCEDURE ROOM / [facility]    Anesthesia Start: 1410 Anesthesia Stop: 1427    Procedure: ESOPHAGOGASTRODUODENOSCOPY BIOPSY (Esophagus) Diagnosis:       Gastric mass      (Gastric mass [K31.89])    Surgeons: Juaquin Vidal MD Responsible Provider: Oscar Dunn MD    Anesthesia Type: MAC ASA Status: 4            Anesthesia Type: No value filed.    Jase Phase I: Jase Score: 10    Jase Phase II:      Anesthesia Post Evaluation    Patient location during evaluation: PACU  Patient participation: complete - patient participated  Level of consciousness: awake and alert  Airway patency: patent  Nausea & Vomiting: no nausea and no vomiting  Cardiovascular status: hemodynamically stable  Respiratory status: room air and spontaneous ventilation  Hydration status: euvolemic  Multimodal analgesia pain management approach  Pain management: adequate    No notable events documented.

## 2025-07-09 NOTE — PROGRESS NOTES
Patient had received aspirin and eliquis on 7/8/25. Per Dr. Florence the liver biopsy procedure can be scheduled outpatient as early as Monday. Spoke with DICK Madsen an she is aware of the plan of care.

## 2025-07-09 NOTE — PROGRESS NOTES
Patient is not able to have liver biopsy until Monday. He needs to be 5 days without aspirin and eliquis. His last dose was 7/8/2025. Patient will need to transfer to [facility] to have this completed on Monday. Otherwise he will need an order for it to be done as OP.

## 2025-07-09 NOTE — CONSULTS
General Surgery: Consult Note  Fabio Jiang and Corrie        PATIENT NAME: Prince Gutierrez   YOB: 1945    ADMISSION DATE: 7/8/2025 11:20 AM     Admitting Provider: Dr. Bruce    Consulted Physician: Dr. Denton     TODAY'S DATE: 7/9/2025    Consult Regarding:  feeding tube    HISTORY OF PRESENT ILLNESS:  The patient is a 80 y.o. male who was admitted on 7/9/2025 with a history of CAD s/p CABG (2022), HLD, HTN, Afib (on Eliquis) and newly discovered gastric mass w/ suspected liver, adrenal and brain metastasis. Over last several weeks patient has been seen in the ER for shortness of breath and upper abdominal pain along with decreased appetite and weight loss; patient was found to have a suspicious mass in the GE junction and stomach along with metastatic lesions.  Patient was initially set up to see surgery and Oncology at [facility] but unfortunately his symptoms progressed and went back to the ER.  Patient states that he has lost approximately 40 lb in the last several weeks due to poor food intake.  States that he previously did not have much pain but does have upper abdominal pain with eating.  States that boost plus was not working well for stomach and has had to thin it out or diluted to avoid much abdominal pain.  Patient denies any nausea.  General surgery was consulted for consideration of a feeding tube given patient's quick decline in nutrition with the malignancy.  Abdominal surgical history of laparoscopic cholecystectomy in 2004.    Past Medical History:        Diagnosis Date    Arthritis     Atrial fibrillation (HCC)     BPH (benign prostatic hyperplasia)     CAD (coronary artery disease) 02/2022    x3 stents    Essential tremor     Glaucoma     Hyperlipidemia     Hypertension     Hypothyroid     Scoliosis     TIA (transient ischemic attack)     Under care of team     cardiology- Dr. Mchugh       Past Surgical History:        Procedure Laterality Date    CARDIOVERSION

## 2025-07-09 NOTE — ANESTHESIA PRE PROCEDURE
Department of Anesthesiology  Preprocedure Note       Name:  Prince Gutierrez   Age:  80 y.o.  :  1945                                          MRN:  [id number]         Date:  2025      Surgeon: Surgeon(s):  Juaquin Vidal MD    Procedure: Procedure(s):  ESOPHAGOGASTRODUODENOSCOPY BIOPSY  possible ESOPHAGOGASTRODUODENOSCOPY PERCUTANEOUS ENDOSCOPIC GASTROSTOMY TUBE PLACEMENT    Medications prior to admission:   Prior to Admission medications    Medication Sig Start Date End Date Taking? Authorizing Provider   calcium carbonate 600 MG TABS tablet Take 1 tablet by mouth daily   Yes Marva Meza MD   magnesium oxide (MAG-OX) 400 (240 Mg) MG tablet Take 0.5 tablets by mouth daily   Yes Marva Meza MD   melatonin 3 MG TABS tablet Take 1 tablet by mouth nightly   Yes Marva Meza MD   levothyroxine (SYNTHROID) 200 MCG tablet Take 1 tablet by mouth Daily 25  Yes Mulugeta Bruce MD   primidone (MYSOLINE) 50 MG tablet TAKE 2 TABLETS BY MOUTH TWICE  DAILY AND 1 TABLET BY MOUTH AT  BEDTIME AS DIRECTED 24  Yes Mulugeta Bruce MD   alfuzosin (UROXATRAL) 10 MG extended release tablet Take 1 tablet by mouth daily   Yes Marva Meza MD   Multiple Vitamins-Minerals (MULTIVITAMIN ADULTS PO) Take 1 tablet by mouth daily   Yes Marva Meza MD   Ferrous Sulfate (IRON) 325 (65 Fe) MG TABS Take 1 tablet by mouth Every Day   Yes Marva Meza MD   aspirin 81 MG EC tablet Take 1 tablet by mouth daily   Yes Marva Meza MD   apixaban (ELIQUIS) 5 MG TABS tablet Take 1 tablet by mouth 2 times daily 10/27/23  Yes Howard Knott MD   atorvastatin (LIPITOR) 80 MG tablet Take 1 tablet by mouth at bedtime 7/10/23  Yes Marva Meza MD   latanoprost (XALATAN) 0.005 % ophthalmic solution Place 1 drop into both eyes nightly 23  Yes Marva Meza MD   metoprolol succinate (TOPROL XL) 50 MG extended release tablet Take 1 tablet by mouth daily

## 2025-07-09 NOTE — PROGRESS NOTES
Occupational Therapy  Occupational Therapy Initial Evaluation  Facility/Department: [address]   Patient Name: Prince Gutierrez        MRN: [id number]    : 1945    Date of Service: 2025    No chief complaint on file.    Past Medical History:  has a past medical history of Arthritis, Atrial fibrillation (HCC), BPH (benign prostatic hyperplasia), CAD (coronary artery disease), Essential tremor, Glaucoma, Hyperlipidemia, Hypertension, Hypothyroid, Scoliosis, TIA (transient ischemic attack), and Under care of team.  Past Surgical History:  has a past surgical history that includes Coronary artery bypass graft (2022); joint replacement (Left); Tonsillectomy; Coronary angioplasty with stent (2022); Cholecystectomy; Colonoscopy (2015); Total hip arthroplasty (Right, 10/25/2023); Total hip arthroplasty (Right, 10/25/2023); Cardioversion (2022); Eye surgery (Right, 2024); and Cataract extraction (Right, 2024).    Discharge Recommendations   Pt may benefit for OT after discharge  OT Equipment Recommendations  Other: AE/DME recommendations TBD    Assessment  Performance deficits / Impairments: Decreased functional mobility ;Decreased ADL status;Decreased ROM;Decreased strength;Decreased safe awareness;Decreased endurance;Decreased balance;Decreased fine motor control;Decreased high-level IADLs  Assessment: Prior to recent admission, the pt was completing all daily acitivities and routnies indpendently. Due to the deficits listed above, the pt is requiring an increased level of assistance to perform ADLs, functional mobility, and transfers. The pt would benefit from skill OT services to improve independence, improve safety, increased engagement in ADLs, and return to OF.  Prognosis: Good  Decision Making: Medium Complexity  REQUIRES OT FOLLOW-UP: Yes  Activity Tolerance  Activity Tolerance: Patient Tolerated treatment well  Safety Devices  Type of Devices: Call light within reach;Gait

## 2025-07-09 NOTE — PLAN OF CARE
Problem: Discharge Planning  Goal: Discharge to home or other facility with appropriate resources  7/9/2025 1148 by Tena Choe RN  Outcome: Progressing  7/9/2025 0318 by Pauline Bruce RN  Outcome: Progressing  Flowsheets (Taken 7/9/2025 0318)  Discharge to home or other facility with appropriate resources: Identify barriers to discharge with patient and caregiver     Problem: Safety - Adult  Goal: Free from fall injury  7/9/2025 1148 by Tena Choe RN  Outcome: Progressing  7/9/2025 0318 by Pauline Bruce RN  Outcome: Progressing  Flowsheets (Taken 7/9/2025 0318)  Free From Fall Injury: Instruct family/caregiver on patient safety     Problem: ABCDS Injury Assessment  Goal: Absence of physical injury  7/9/2025 1148 by Tena Choe RN  Outcome: Progressing  7/9/2025 0318 by Pauline Bruce RN  Outcome: Progressing  Flowsheets (Taken 7/9/2025 0318)  Absence of Physical Injury: Implement safety measures based on patient assessment     Problem: Gastrointestinal - Adult  Goal: Minimal or absence of nausea and vomiting  7/9/2025 1148 by Tena Choe RN  Outcome: Progressing  7/9/2025 0318 by Pauline Bruce RN  Outcome: Progressing  Flowsheets (Taken 7/9/2025 0318)  Minimal or absence of nausea and vomiting:   Administer IV fluids as ordered to ensure adequate hydration   Administer ordered antiemetic medications as needed     Problem: Hematologic - Adult  Goal: Maintains hematologic stability  7/9/2025 1148 by Tena Choe RN  Outcome: Progressing  7/9/2025 0318 by Pauline Bruce RN  Outcome: Progressing  Flowsheets (Taken 7/9/2025 0318)  Maintains hematologic stability:   Assess for signs and symptoms of bleeding or hemorrhage   Monitor labs for bleeding or clotting disorders     Problem: Pain  Goal: Verbalizes/displays adequate comfort level or baseline comfort level  Outcome: Progressing

## 2025-07-09 NOTE — PLAN OF CARE
Problem: Discharge Planning  Goal: Discharge to home or other facility with appropriate resources  7/9/2025 0318 by Pauline Bruce RN  Outcome: Progressing  Flowsheets (Taken 7/9/2025 0318)  Discharge to home or other facility with appropriate resources: Identify barriers to discharge with patient and caregiver  7/8/2025 1409 by Trisha York RN  Outcome: Progressing  Flowsheets (Taken 7/8/2025 1127 by Sarai Jackson RN)  Discharge to home or other facility with appropriate resources:   Identify barriers to discharge with patient and caregiver   Identify discharge learning needs (meds, wound care, etc)   Refer to discharge planning if patient needs post-hospital services based on physician order or complex needs related to functional status, cognitive ability or social support system   Arrange for needed discharge resources and transportation as appropriate     Problem: Safety - Adult  Goal: Free from fall injury  7/9/2025 0318 by Pauline Bruce RN  Outcome: Progressing  Flowsheets (Taken 7/9/2025 0318)  Free From Fall Injury: Instruct family/caregiver on patient safety  7/8/2025 1409 by Trisha York RN  Outcome: Progressing     Problem: ABCDS Injury Assessment  Goal: Absence of physical injury  7/9/2025 0318 by Pauline Bruce RN  Outcome: Progressing  Flowsheets (Taken 7/9/2025 0318)  Absence of Physical Injury: Implement safety measures based on patient assessment  7/8/2025 1409 by Trisha York RN  Outcome: Progressing     Problem: Gastrointestinal - Adult  Goal: Minimal or absence of nausea and vomiting  Outcome: Progressing  Flowsheets (Taken 7/9/2025 0318)  Minimal or absence of nausea and vomiting:   Administer IV fluids as ordered to ensure adequate hydration   Administer ordered antiemetic medications as needed     Problem: Hematologic - Adult  Goal: Maintains hematologic stability  Outcome: Progressing  Flowsheets (Taken 7/9/2025 0318)  Maintains hematologic stability:   Assess for signs

## 2025-07-09 NOTE — CARE COORDINATION
Dr. Thompson in speaking with patient and his family when CM attempted to see for transitional planning. Initial transitional assessment to be completed at a later time.    1246- Patient off unit when CM attempted to see. [facility] list left at bedside for patient to review.

## 2025-07-09 NOTE — PROGRESS NOTES
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 Metastases involving the liver, left adrenal gland, and involving a periaortic lymph node. Area of nodularity in the left lower lobe   periphery which may also represent a metastasis  -CTH shows 1.5 cm heterogeneous cortical and subcortical mass in the right parietal lobe  with associated vasogenic edema consistent with metastatic lesion  -MRI brain ordered  -Dexamethasone started (IV given decreased swallow ability)  -IR consulted for liver mass biopsy  -GI consulted for EGD/EUS/biopsy  -Tumor markers pending  -General surgery planning port placement  -Further recommendations forthcoming pending pathology     Difficulty swallowing, Malnutrition  -Secondary to GE junction stenosis versus gastric outlet obstruction, in setting of above  -GI consulted, EGD planned  -Would benefit from feeding tube if aggressive cancer treatment is to be pursued--> general surgery consulted  -?J tube vs PEG tube pending EGD findings  -Nutritional services consult    Deconditioning/debility  -PT/OT    Anemia  -Mild. Suspect bleeding gastroesophageal mass is contributory given reported melena on Eliquis  -Hgb=12.4(12.2) normocytic  -Will send basic nutritional labs  -Eliquis presently on hold  -Transfuse for hgb<7    Afib  -Eliquis presently on hold. Await GI recs following EGD re: resuming therapy  -Rest as per other services    Continue management of other medical problems as per appropriate services.     Patient discussed with Dr. Cerda. Please call with questions.     Subjective/Interval History:   Patient seen and examined at bedside with

## 2025-07-09 NOTE — OP NOTE
PROCEDURE NOTE    DATE OF PROCEDURE: 7/9/2025     SURGEON: Juaquin Vidal MD  Facility: [facility]   ASSISTANT: None  Anesthesia: Monitored anesthesia care  PREOPERATIVE DIAGNOSIS: GE junction mass    Diagnosis:    POSTOPERATIVE DIAGNOSIS: As described below    OPERATION: Upper GI endoscopy with Biopsy    ANESTHESIA: Moderate Sedation     ESTIMATED BLOOD LOSS: Less than 50 ml    COMPLICATIONS: None.     SPECIMENS:  Was Obtained: Mass in gastric cardia    HISTORY: The patient is a 80 y.o. year old male with history of above preop diagnosis.  I recommended esophagogastroduodenoscopy with possible biopsy and I explained the risk, benefits, expected outcome, and alternatives to the procedure.  Risks included but are not limited to bleeding, infection, respiratory distress, hypotension, and perforation of the esophagus, stomach, or duodenum.  Patient understands and is in agreement.      PROCEDURE: The patient was given IV conscious sedation.  The patient's SPO2 remained above 90% throughout the procedure.The gastroscope was inserted orally and advanced under direct vision through the esophagus, through the stomach, through the pylorus, and into the descending duodenum.      Post sedation note :The patient's SPO2 remained above 90% throughout the procedure.the vital signs remained stable , and no immediate complication form the procedure noted, patient will be ready for d/c when criteria is met .      Findings:    Retropharyngeal area was grossly normal appearing    Esophagus: abnormal: mass at GE junction extending from cardia and fundus of stomach;      Esophagogastric markings: Diaphragmatic hiatus- 40 cm; GE junction- 40 cm; Squamo-columnar junction- 40 cm    Stomach:    Fundus: abnormal: large friable mass at gastric cardia and fundus, biopsy obtained, R/o- GIST    Body: normal    Antrum: normal    Duodenum:     Descending: normal    Bulb: normal      The scope was removed and the patient tolerated the procedure

## 2025-07-09 NOTE — CONSULTS
Gastroenterology Consult Note    Patient:   Prince Gutierrez   Admit date:  7/8/2025  Facility:   [facility]  Referring/PCP: Mulugeta Bruce MD  Date:     7/9/2025  Consultant:   DOMO Chilel NP, Juaquin Vidal MD    Subjective:     This 80 y.o. 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    80/M w/ pmhx of arthritis, A.fib on Eliquis, BPH, CAD, HLD, HTN, TIA presents to ED with progressive shortness of breath, decreased appetite, postprandial epigastric pain.  Patient was recently admitted for similar symptoms and found to have suspected lesion GE junction and stomach as well as lesions in liver, adrenal glands, lungs, retroperitoneal lymph nodes.  Patient had consultation with general surgery who referred patient to [facility] and was told that he would have to have a complicated surgery of the abdomen area and that [facility] would be necessary for this kind of surgery.  He did present to [facility] ER and was discharged but assigned a case coordinator and liver biopsy was scheduled for 7/15/25 and and oncology appt was made for 7/29/25. Patient unfortunately had progressive symptoms and came to ED for further evaluation.  Patient reports that he has been feeling dizzy, lightheaded over the last several days.  He reports that he is only able to drink fluids and eat yogurt.  He reports feeling as if he food is getting \"stacked up\".  He reports bowel movements are infrequent and are typically dark in color.  He reports lower abdominal pain last evening that has resolved.    Patient is on Eliquis for A.fib.  Last west 7/7/25    GI consulted for mass at GE junction.    Past Medical History:  Past Medical History:   Diagnosis Date    Arthritis     Atrial fibrillation (HCC)     BPH (benign prostatic hyperplasia)     CAD (coronary artery disease)

## 2025-07-10 ENCOUNTER — APPOINTMENT (OUTPATIENT)
Dept: GENERAL RADIOLOGY | Age: 80
End: 2025-07-10
Attending: FAMILY MEDICINE
Payer: MEDICARE

## 2025-07-10 ENCOUNTER — ANESTHESIA (OUTPATIENT)
Dept: OPERATING ROOM | Age: 80
End: 2025-07-10
Payer: MEDICARE

## 2025-07-10 ENCOUNTER — ANESTHESIA EVENT (OUTPATIENT)
Dept: OPERATING ROOM | Age: 80
End: 2025-07-10
Payer: MEDICARE

## 2025-07-10 ENCOUNTER — TELEPHONE (OUTPATIENT)
Age: 80
End: 2025-07-10

## 2025-07-10 PROBLEM — E44.0 MODERATE PROTEIN MALNUTRITION: Status: ACTIVE | Noted: 2025-07-10

## 2025-07-10 LAB
ALBUMIN SERPL-MCNC: 3.8 G/DL (ref 3.5–5.2)
ALBUMIN/GLOB SERPL: 1.2 {RATIO} (ref 1–2.5)
ALP SERPL-CCNC: 152 U/L (ref 40–129)
ALT SERPL-CCNC: 19 U/L (ref 10–50)
ANION GAP SERPL CALCULATED.3IONS-SCNC: 14 MMOL/L (ref 9–16)
AST SERPL-CCNC: 54 U/L (ref 10–50)
BASOPHILS # BLD: 0 K/UL (ref 0–0.2)
BASOPHILS NFR BLD: 1 % (ref 0–2)
BILIRUB SERPL-MCNC: 0.5 MG/DL (ref 0–1.2)
BUN SERPL-MCNC: 22 MG/DL (ref 8–23)
CALCIUM SERPL-MCNC: 10.5 MG/DL (ref 8.6–10.4)
CHLORIDE SERPL-SCNC: 102 MMOL/L (ref 98–107)
CO2 SERPL-SCNC: 21 MMOL/L (ref 20–31)
CREAT SERPL-MCNC: 0.9 MG/DL (ref 0.7–1.2)
EOSINOPHIL # BLD: 0 K/UL (ref 0–0.4)
EOSINOPHILS RELATIVE PERCENT: 1 % (ref 1–4)
ERYTHROCYTE [DISTWIDTH] IN BLOOD BY AUTOMATED COUNT: 13.1 % (ref 12.5–15.4)
GFR, ESTIMATED: 86 ML/MIN/1.73M2
GLUCOSE SERPL-MCNC: 109 MG/DL (ref 74–99)
HCT VFR BLD AUTO: 39.6 % (ref 41–53)
HGB BLD-MCNC: 13.5 G/DL (ref 13.5–17.5)
INR PPP: 1.3 (ref 0.9–1.2)
LYMPHOCYTES NFR BLD: 0.8 K/UL (ref 1–4.8)
LYMPHOCYTES RELATIVE PERCENT: 13 % (ref 24–44)
MCH RBC QN AUTO: 29.9 PG (ref 26–34)
MCHC RBC AUTO-ENTMCNC: 34.1 G/DL (ref 31–37)
MCV RBC AUTO: 87.9 FL (ref 80–100)
MONOCYTES NFR BLD: 0.5 K/UL (ref 0.1–1.2)
MONOCYTES NFR BLD: 8 % (ref 2–11)
NEUTROPHILS NFR BLD: 77 % (ref 36–66)
NEUTS SEG NFR BLD: 4.8 K/UL (ref 1.8–7.7)
PLATELET # BLD AUTO: 224 K/UL (ref 140–450)
PMV BLD AUTO: 7.8 FL (ref 6–12)
POTASSIUM SERPL-SCNC: 4.2 MMOL/L (ref 3.7–5.3)
PROT SERPL-MCNC: 7 G/DL (ref 6.6–8.7)
PROTHROMBIN TIME: 16.9 SEC (ref 11.8–14.6)
RBC # BLD AUTO: 4.51 M/UL (ref 4.5–5.9)
SODIUM SERPL-SCNC: 137 MMOL/L (ref 136–145)
WBC OTHER # BLD: 6.1 K/UL (ref 3.5–11)

## 2025-07-10 PROCEDURE — 2720000010 HC SURG SUPPLY STERILE: Performed by: SURGERY

## 2025-07-10 PROCEDURE — 6360000002 HC RX W HCPCS: Performed by: SURGERY

## 2025-07-10 PROCEDURE — 6370000000 HC RX 637 (ALT 250 FOR IP): Performed by: INTERNAL MEDICINE

## 2025-07-10 PROCEDURE — APPSS30 APP SPLIT SHARED TIME 16-30 MINUTES: Performed by: NURSE PRACTITIONER

## 2025-07-10 PROCEDURE — 2500000003 HC RX 250 WO HCPCS: Performed by: INTERNAL MEDICINE

## 2025-07-10 PROCEDURE — 6370000000 HC RX 637 (ALT 250 FOR IP): Performed by: SURGERY

## 2025-07-10 PROCEDURE — 0JH60WZ INSERTION OF TOTALLY IMPLANTABLE VASCULAR ACCESS DEVICE INTO CHEST SUBCUTANEOUS TISSUE AND FASCIA, OPEN APPROACH: ICD-10-PCS | Performed by: SURGERY

## 2025-07-10 PROCEDURE — 36415 COLL VENOUS BLD VENIPUNCTURE: CPT

## 2025-07-10 PROCEDURE — 85025 COMPLETE CBC W/AUTO DIFF WBC: CPT

## 2025-07-10 PROCEDURE — 2500000003 HC RX 250 WO HCPCS: Performed by: ANESTHESIOLOGY

## 2025-07-10 PROCEDURE — 99232 SBSQ HOSP IP/OBS MODERATE 35: CPT | Performed by: INTERNAL MEDICINE

## 2025-07-10 PROCEDURE — 7100000000 HC PACU RECOVERY - FIRST 15 MIN: Performed by: SURGERY

## 2025-07-10 PROCEDURE — 2500000003 HC RX 250 WO HCPCS: Performed by: SURGERY

## 2025-07-10 PROCEDURE — 6360000002 HC RX W HCPCS: Performed by: INTERNAL MEDICINE

## 2025-07-10 PROCEDURE — 3600000003 HC SURGERY LEVEL 3 BASE: Performed by: SURGERY

## 2025-07-10 PROCEDURE — 2709999900 HC NON-CHARGEABLE SUPPLY: Performed by: SURGERY

## 2025-07-10 PROCEDURE — 71045 X-RAY EXAM CHEST 1 VIEW: CPT

## 2025-07-10 PROCEDURE — 7100000001 HC PACU RECOVERY - ADDTL 15 MIN: Performed by: SURGERY

## 2025-07-10 PROCEDURE — C1788 PORT, INDWELLING, IMP: HCPCS | Performed by: SURGERY

## 2025-07-10 PROCEDURE — 3600000013 HC SURGERY LEVEL 3 ADDTL 15MIN: Performed by: SURGERY

## 2025-07-10 PROCEDURE — 6360000002 HC RX W HCPCS: Performed by: ANESTHESIOLOGY

## 2025-07-10 PROCEDURE — 3700000001 HC ADD 15 MINUTES (ANESTHESIA): Performed by: SURGERY

## 2025-07-10 PROCEDURE — 3700000000 HC ANESTHESIA ATTENDED CARE: Performed by: SURGERY

## 2025-07-10 PROCEDURE — 0DH63UZ INSERTION OF FEEDING DEVICE INTO STOMACH, PERCUTANEOUS APPROACH: ICD-10-PCS | Performed by: SURGERY

## 2025-07-10 PROCEDURE — 80053 COMPREHEN METABOLIC PANEL: CPT

## 2025-07-10 PROCEDURE — 6360000002 HC RX W HCPCS: Performed by: NURSE PRACTITIONER

## 2025-07-10 PROCEDURE — 02HV33Z INSERTION OF INFUSION DEVICE INTO SUPERIOR VENA CAVA, PERCUTANEOUS APPROACH: ICD-10-PCS | Performed by: SURGERY

## 2025-07-10 PROCEDURE — 2060000000 HC ICU INTERMEDIATE R&B

## 2025-07-10 PROCEDURE — 99232 SBSQ HOSP IP/OBS MODERATE 35: CPT | Performed by: FAMILY MEDICINE

## 2025-07-10 PROCEDURE — 85610 PROTHROMBIN TIME: CPT

## 2025-07-10 PROCEDURE — 6360000002 HC RX W HCPCS: Performed by: NURSE ANESTHETIST, CERTIFIED REGISTERED

## 2025-07-10 DEVICE — POWERPORT CLEARVUE ISP IMPLANTABLE PORT WITH ATTACHABLE 8F POLYURETHANE OPEN-ENDED SINGLE-LUMEN VENOUS CATHETER PROCEDURAL KIT
Type: IMPLANTABLE DEVICE | Site: OTHER | Status: FUNCTIONAL
Brand: POWERPORT CLEARVUE

## 2025-07-10 RX ORDER — GINSENG 100 MG
CAPSULE ORAL
Status: DISCONTINUED
Start: 2025-07-10 | End: 2025-07-10

## 2025-07-10 RX ORDER — LABETALOL HYDROCHLORIDE 5 MG/ML
10 INJECTION, SOLUTION INTRAVENOUS
Status: DISCONTINUED | OUTPATIENT
Start: 2025-07-10 | End: 2025-07-10 | Stop reason: HOSPADM

## 2025-07-10 RX ORDER — CYANOCOBALAMIN 1000 UG/ML
1000 INJECTION, SOLUTION INTRAMUSCULAR; SUBCUTANEOUS DAILY
Status: COMPLETED | OUTPATIENT
Start: 2025-07-10 | End: 2025-07-14

## 2025-07-10 RX ORDER — MIDAZOLAM HYDROCHLORIDE 2 MG/2ML
2 INJECTION, SOLUTION INTRAMUSCULAR; INTRAVENOUS
Status: DISCONTINUED | OUTPATIENT
Start: 2025-07-10 | End: 2025-07-10 | Stop reason: HOSPADM

## 2025-07-10 RX ORDER — OXYCODONE HYDROCHLORIDE 5 MG/1
5 TABLET ORAL PRN
Status: DISCONTINUED | OUTPATIENT
Start: 2025-07-10 | End: 2025-07-10 | Stop reason: HOSPADM

## 2025-07-10 RX ORDER — MIDAZOLAM HYDROCHLORIDE 1 MG/ML
INJECTION, SOLUTION INTRAMUSCULAR; INTRAVENOUS
Status: DISCONTINUED | OUTPATIENT
Start: 2025-07-10 | End: 2025-07-10 | Stop reason: SDUPTHER

## 2025-07-10 RX ORDER — OXYCODONE HYDROCHLORIDE 5 MG/1
10 TABLET ORAL PRN
Status: DISCONTINUED | OUTPATIENT
Start: 2025-07-10 | End: 2025-07-10 | Stop reason: HOSPADM

## 2025-07-10 RX ORDER — DIPHENHYDRAMINE HYDROCHLORIDE 50 MG/ML
12.5 INJECTION, SOLUTION INTRAMUSCULAR; INTRAVENOUS
Status: DISCONTINUED | OUTPATIENT
Start: 2025-07-10 | End: 2025-07-10 | Stop reason: HOSPADM

## 2025-07-10 RX ORDER — MORPHINE SULFATE 2 MG/ML
1 INJECTION, SOLUTION INTRAMUSCULAR; INTRAVENOUS EVERY 5 MIN PRN
Status: DISCONTINUED | OUTPATIENT
Start: 2025-07-10 | End: 2025-07-10 | Stop reason: HOSPADM

## 2025-07-10 RX ORDER — CEFAZOLIN SODIUM 1 G/3ML
INJECTION, POWDER, FOR SOLUTION INTRAMUSCULAR; INTRAVENOUS
Status: DISCONTINUED | OUTPATIENT
Start: 2025-07-10 | End: 2025-07-10 | Stop reason: SDUPTHER

## 2025-07-10 RX ORDER — ONDANSETRON 2 MG/ML
4 INJECTION INTRAMUSCULAR; INTRAVENOUS
Status: DISCONTINUED | OUTPATIENT
Start: 2025-07-10 | End: 2025-07-10 | Stop reason: HOSPADM

## 2025-07-10 RX ORDER — BUPIVACAINE HCL/EPINEPHRINE 0.5-1:200K
VIAL (ML) INJECTION
Status: DISCONTINUED
Start: 2025-07-10 | End: 2025-07-10

## 2025-07-10 RX ORDER — METOCLOPRAMIDE HYDROCHLORIDE 5 MG/ML
10 INJECTION INTRAMUSCULAR; INTRAVENOUS
Status: DISCONTINUED | OUTPATIENT
Start: 2025-07-10 | End: 2025-07-10 | Stop reason: HOSPADM

## 2025-07-10 RX ORDER — SODIUM CHLORIDE 0.9 % (FLUSH) 0.9 %
5-40 SYRINGE (ML) INJECTION PRN
Status: DISCONTINUED | OUTPATIENT
Start: 2025-07-10 | End: 2025-07-10 | Stop reason: HOSPADM

## 2025-07-10 RX ORDER — BUPIVACAINE HYDROCHLORIDE 5 MG/ML
INJECTION, SOLUTION EPIDURAL; INTRACAUDAL; PERINEURAL
Status: DISCONTINUED
Start: 2025-07-10 | End: 2025-07-10

## 2025-07-10 RX ORDER — MORPHINE SULFATE 2 MG/ML
2 INJECTION, SOLUTION INTRAMUSCULAR; INTRAVENOUS
Status: DISCONTINUED | OUTPATIENT
Start: 2025-07-10 | End: 2025-07-17 | Stop reason: HOSPADM

## 2025-07-10 RX ORDER — HEPARIN SODIUM 1000 [USP'U]/ML
INJECTION, SOLUTION INTRAVENOUS; SUBCUTANEOUS
Status: DISCONTINUED
Start: 2025-07-10 | End: 2025-07-10

## 2025-07-10 RX ORDER — MEPERIDINE HYDROCHLORIDE 50 MG/ML
12.5 INJECTION INTRAMUSCULAR; INTRAVENOUS; SUBCUTANEOUS ONCE
Status: DISCONTINUED | OUTPATIENT
Start: 2025-07-10 | End: 2025-07-10 | Stop reason: HOSPADM

## 2025-07-10 RX ORDER — SODIUM CHLORIDE 0.9 % (FLUSH) 0.9 %
5-40 SYRINGE (ML) INJECTION EVERY 12 HOURS SCHEDULED
Status: DISCONTINUED | OUTPATIENT
Start: 2025-07-10 | End: 2025-07-10 | Stop reason: HOSPADM

## 2025-07-10 RX ORDER — FENTANYL CITRATE 50 UG/ML
INJECTION, SOLUTION INTRAMUSCULAR; INTRAVENOUS
Status: DISCONTINUED | OUTPATIENT
Start: 2025-07-10 | End: 2025-07-10 | Stop reason: SDUPTHER

## 2025-07-10 RX ORDER — BUPIVACAINE HYDROCHLORIDE 5 MG/ML
INJECTION, SOLUTION EPIDURAL; INTRACAUDAL; PERINEURAL
Status: DISPENSED
Start: 2025-07-10 | End: 2025-07-11

## 2025-07-10 RX ORDER — LIDOCAINE HYDROCHLORIDE AND EPINEPHRINE 10; 10 MG/ML; UG/ML
INJECTION, SOLUTION INFILTRATION; PERINEURAL
Status: DISCONTINUED
Start: 2025-07-10 | End: 2025-07-10

## 2025-07-10 RX ORDER — SODIUM CHLORIDE 9 MG/ML
INJECTION, SOLUTION INTRAVENOUS PRN
Status: DISCONTINUED | OUTPATIENT
Start: 2025-07-10 | End: 2025-07-10 | Stop reason: HOSPADM

## 2025-07-10 RX ORDER — PROPOFOL 10 MG/ML
INJECTION, EMULSION INTRAVENOUS
Status: DISCONTINUED | OUTPATIENT
Start: 2025-07-10 | End: 2025-07-10 | Stop reason: SDUPTHER

## 2025-07-10 RX ORDER — HYDRALAZINE HYDROCHLORIDE 20 MG/ML
10 INJECTION INTRAMUSCULAR; INTRAVENOUS
Status: DISCONTINUED | OUTPATIENT
Start: 2025-07-10 | End: 2025-07-10 | Stop reason: HOSPADM

## 2025-07-10 RX ADMIN — METOPROLOL SUCCINATE 50 MG: 50 TABLET, EXTENDED RELEASE ORAL at 09:18

## 2025-07-10 RX ADMIN — PROPOFOL 180 MCG/KG/MIN: 10 INJECTION, EMULSION INTRAVENOUS at 15:45

## 2025-07-10 RX ADMIN — MAGNESIUM GLUCONATE 500 MG ORAL TABLET 200 MG: 500 TABLET ORAL at 09:19

## 2025-07-10 RX ADMIN — TAMSULOSIN HYDROCHLORIDE 0.4 MG: 0.4 CAPSULE ORAL at 09:19

## 2025-07-10 RX ADMIN — DEXAMETHASONE SODIUM PHOSPHATE 4 MG: 10 INJECTION INTRAMUSCULAR; INTRAVENOUS at 09:19

## 2025-07-10 RX ADMIN — LEVOTHYROXINE SODIUM 200 MCG: 125 TABLET ORAL at 05:31

## 2025-07-10 RX ADMIN — SODIUM CHLORIDE, PRESERVATIVE FREE 10 ML: 5 INJECTION INTRAVENOUS at 09:20

## 2025-07-10 RX ADMIN — MIDAZOLAM 1 MG: 1 INJECTION INTRAMUSCULAR; INTRAVENOUS at 16:10

## 2025-07-10 RX ADMIN — Medication 3 MG: at 22:55

## 2025-07-10 RX ADMIN — CEFAZOLIN 2 G: 1 INJECTION, POWDER, FOR SOLUTION INTRAMUSCULAR; INTRAVENOUS at 15:45

## 2025-07-10 RX ADMIN — SODIUM CHLORIDE, PRESERVATIVE FREE 10 ML: 5 INJECTION INTRAVENOUS at 22:50

## 2025-07-10 RX ADMIN — CYANOCOBALAMIN 1000 MCG: 1000 INJECTION, SOLUTION INTRAMUSCULAR; SUBCUTANEOUS at 12:29

## 2025-07-10 RX ADMIN — MIDAZOLAM 1 MG: 1 INJECTION INTRAMUSCULAR; INTRAVENOUS at 15:45

## 2025-07-10 RX ADMIN — FAMOTIDINE 20 MG: 20 TABLET, FILM COATED ORAL at 22:54

## 2025-07-10 RX ADMIN — FAMOTIDINE 20 MG: 20 TABLET, FILM COATED ORAL at 09:19

## 2025-07-10 RX ADMIN — SODIUM CHLORIDE, PRESERVATIVE FREE 40 MG: 5 INJECTION INTRAVENOUS at 09:19

## 2025-07-10 RX ADMIN — PRIMIDONE 50 MG: 50 TABLET ORAL at 22:55

## 2025-07-10 RX ADMIN — MORPHINE SULFATE 2 MG: 2 INJECTION, SOLUTION INTRAMUSCULAR; INTRAVENOUS at 08:59

## 2025-07-10 RX ADMIN — FENTANYL CITRATE 25 MCG: 50 INJECTION, SOLUTION INTRAMUSCULAR; INTRAVENOUS at 16:23

## 2025-07-10 RX ADMIN — Medication 1 TABLET: at 09:19

## 2025-07-10 RX ADMIN — PRIMIDONE 100 MG: 50 TABLET ORAL at 09:18

## 2025-07-10 RX ADMIN — LATANOPROST 1 DROP: 50 SOLUTION OPHTHALMIC at 22:50

## 2025-07-10 RX ADMIN — MORPHINE SULFATE 2 MG: 2 INJECTION, SOLUTION INTRAMUSCULAR; INTRAVENOUS at 17:35

## 2025-07-10 RX ADMIN — MORPHINE SULFATE 2 MG: 2 INJECTION, SOLUTION INTRAMUSCULAR; INTRAVENOUS at 22:47

## 2025-07-10 RX ADMIN — ATORVASTATIN CALCIUM 80 MG: 40 TABLET, FILM COATED ORAL at 22:54

## 2025-07-10 RX ADMIN — DEXAMETHASONE SODIUM PHOSPHATE 4 MG: 10 INJECTION INTRAMUSCULAR; INTRAVENOUS at 23:08

## 2025-07-10 RX ADMIN — FENTANYL CITRATE 25 MCG: 50 INJECTION, SOLUTION INTRAMUSCULAR; INTRAVENOUS at 15:45

## 2025-07-10 ASSESSMENT — PAIN SCALES - GENERAL
PAINLEVEL_OUTOF10: 3
PAINLEVEL_OUTOF10: 0
PAINLEVEL_OUTOF10: 7
PAINLEVEL_OUTOF10: 6
PAINLEVEL_OUTOF10: 0
PAINLEVEL_OUTOF10: 8

## 2025-07-10 ASSESSMENT — PAIN DESCRIPTION - LOCATION
LOCATION: ABDOMEN;INCISION;CHEST
LOCATION: ABDOMEN
LOCATION: ABDOMEN
LOCATION: INCISION;CHEST

## 2025-07-10 ASSESSMENT — PAIN DESCRIPTION - DESCRIPTORS
DESCRIPTORS: SORE;TENDER
DESCRIPTORS: SHARP;ACHING
DESCRIPTORS: ACHING
DESCRIPTORS: TENDER

## 2025-07-10 ASSESSMENT — PAIN - FUNCTIONAL ASSESSMENT
PAIN_FUNCTIONAL_ASSESSMENT: NONE - DENIES PAIN
PAIN_FUNCTIONAL_ASSESSMENT: PREVENTS OR INTERFERES SOME ACTIVE ACTIVITIES AND ADLS
PAIN_FUNCTIONAL_ASSESSMENT: NONE - DENIES PAIN

## 2025-07-10 ASSESSMENT — PAIN DESCRIPTION - ORIENTATION
ORIENTATION: RIGHT;LEFT;LOWER
ORIENTATION: MID

## 2025-07-10 NOTE — PLAN OF CARE
Problem: Discharge Planning  Goal: Discharge to home or other facility with appropriate resources  Outcome: Progressing  Flowsheets  Taken 7/10/2025 1803  Discharge to home or other facility with appropriate resources:   Identify barriers to discharge with patient and caregiver   Identify discharge learning needs (meds, wound care, etc)   Arrange for needed discharge resources and transportation as appropriate   Refer to discharge planning if patient needs post-hospital services based on physician order or complex needs related to functional status, cognitive ability or social support system  Taken 7/10/2025 1115  Discharge to home or other facility with appropriate resources:   Identify barriers to discharge with patient and caregiver   Arrange for needed discharge resources and transportation as appropriate   Identify discharge learning needs (meds, wound care, etc)   Refer to discharge planning if patient needs post-hospital services based on physician order or complex needs related to functional status, cognitive ability or social support system  Taken 7/10/2025 0736  Discharge to home or other facility with appropriate resources:   Identify barriers to discharge with patient and caregiver   Arrange for needed discharge resources and transportation as appropriate   Identify discharge learning needs (meds, wound care, etc)   Refer to discharge planning if patient needs post-hospital services based on physician order or complex needs related to functional status, cognitive ability or social support system     Problem: Safety - Adult  Goal: Free from fall injury  Outcome: Progressing     Problem: ABCDS Injury Assessment  Goal: Absence of physical injury  Outcome: Progressing     Problem: Gastrointestinal - Adult  Goal: Minimal or absence of nausea and vomiting  Outcome: Progressing  Flowsheets  Taken 7/10/2025 1803  Minimal or absence of nausea and vomiting:   Administer IV fluids as ordered to ensure adequate

## 2025-07-10 NOTE — OP NOTE
Operative Note      Patient: Prince Gutierrez  YOB: 1945  MRN: [id number]    Date of Procedure: 7/10/2025    Pre-Op Diagnosis Codes:      * Malignant neoplasm of stomach, unspecified location (HCC) [C16.9]    Post-Op Diagnosis: Same       Procedure(s):  ESOPHAGOGASTRODUODENOSCOPY PERCUTANEOUS ENDOSCOPIC GASTROSTOMY TUBE INSERTION  PORT INSERTION    Surgeon(s):  Ashutosh Mccarthy MD Palmer, Robert, MD    Assistant:   * No surgical staff found *    Anesthesia: General    Estimated Blood Loss (mL): Minimal    Complications: None    Specimens:   * No specimens in log *    Implants:  Implant Name Type Inv. Item Serial No.  Lot No. LRB No. Used Action   PORT INFUS SGL LUMN ATTCH POLYUR OPN END CATH 8FR POWERPRT - [id number]  PORT INFUS SGL LUMN ATTCH POLYUR OPN END CATH 8FR POWERPRT  [employer] [id number] N/A 1 Implanted         Drains:   Gastrostomy/Enterostomy/Jejunostomy Tube Percutaneous Endoscopic Gastrostomy (PEG) LUQ 1 20 fr (Active)   Drainage Appearance Bloody 07/10/25 1637   Site Description Clean, dry & intact 07/10/25 1637   Surrounding Skin Clean, dry & intact 07/10/25 1637   Dressing Status New drainage noted 07/10/25 1637   Dressing Type Split gauze 07/10/25 1637       Findings:  Present At Time Of Surgery (PATOS) (choose all levels that have infection present):  No infection present  Other Findings: Large friable proximal gastric mass    Indications for procedure: This is a very unfortunate 80-year-old gentleman with a history of metastatic gastric cancer.  The patient is noted to have evidence of a friable gastric mass.  The patient has developed severe protein calorie malnutrition and is in need of long-term intravenous access for chemotherapy.  The risks and benefits of Port-A-Cath insertion and EGD with percutaneous gastrostomy tube insertion were discussed with the patient and verbal and written consent was obtained.  We elected to proceed with a percutaneous

## 2025-07-10 NOTE — ANESTHESIA POSTPROCEDURE EVALUATION
Department of Anesthesiology  Postprocedure Note    Patient: Prince Gutierrez  MRN: [id number]  YOB: 1945  Date of evaluation: 7/10/2025    Procedure Summary       Date: 07/10/25 Room / Location: [address]    Anesthesia Start: 1543 Anesthesia Stop: 1642    Procedures:       ESOPHAGOGASTRODUODENOSCOPY PERCUTANEOUS ENDOSCOPIC GASTROSTOMY TUBE INSERTION (Esophagus)      PORT INSERTION Diagnosis:       Malignant neoplasm of stomach, unspecified location (HCC)      (Malignant neoplasm of stomach, unspecified location (HCC) [C16.9])    Surgeons: Ashutosh Mccarthy MD Responsible Provider: Tahmina Crandall MD    Anesthesia Type: MAC, general ASA Status: 4            Anesthesia Type: No value filed.    Jase Phase I: Jase Score: 9    Jase Phase II:      Anesthesia Post Evaluation    Patient location during evaluation: PACU  Patient participation: complete - patient participated  Level of consciousness: awake and alert  Airway patency: patent  Nausea & Vomiting: no nausea and no vomiting  Cardiovascular status: blood pressure returned to baseline  Respiratory status: acceptable and room air  Hydration status: euvolemic  Pain management: adequate and satisfactory to patient    No notable events documented.

## 2025-07-10 NOTE — PLAN OF CARE
Problem: Discharge Planning  Goal: Discharge to home or other facility with appropriate resources  Outcome: Progressing     Problem: Safety - Adult  Goal: Free from fall injury  Outcome: Progressing     Problem: ABCDS Injury Assessment  Goal: Absence of physical injury  Outcome: Progressing     Problem: Gastrointestinal - Adult  Goal: Minimal or absence of nausea and vomiting  Outcome: Progressing     Problem: Hematologic - Adult  Goal: Maintains hematologic stability  Outcome: Progressing     Problem: Pain  Goal: Verbalizes/displays adequate comfort level or baseline comfort level  Outcome: Progressing

## 2025-07-10 NOTE — PROGRESS NOTES
Comprehensive Nutrition Assessment    Type and Reason for Visit:  Initial, Positive nutrition screen, Consult, NPO/clear liquid, Nutrition support    Nutrition Recommendations/Plan:   Obtain current weight  Monitor for initiation of nutrition;possible PEG placement   3.   While inpatient: Peptide based formula continuous at 58 mL/hr ATC w/  mL q 6 hrs; transition to bolus as tolerated for discharge vital AF 1.2 237 mL via syringe 6x/day w/ FWF 50 mL before and after each bolus        Malnutrition Assessment:  Malnutrition Status:  Insufficient data (07/10/25 1028)    Context:  Chronic Illness     Findings of the 6 clinical characteristics of malnutrition:  Energy Intake:  Mild decrease in energy intake  Weight Loss:  Greater than 10% over 6 months     Body Fat Loss:  Unable to assess     Muscle Mass Loss:  Unable to assess    Fluid Accumulation:  Unable to assess     Strength:  Not Performed    Nutrition Assessment:    Consult to Registered Dietitian for unintentional weight loss. Plan is for patient to get PEG today. Admitted for metastasis from gastric cancer. Dysphagia diet w/ ONS BID p/t placement. NPO for EGD/possible PEG placement. PNS for reported weight loss, decreased appetite. Noted significant weight loss from weight hx available; patient w/ approx 28 lb weight loss x 5 months. Writer spoke w/ case management this date who relates patient will probably need bolus TF order at discharge. Recommend to obtain current weight if/when PEG placement completed. Recommend to initiate peptide based tube feed Vital AF if patient gets PEG placed today - w/ goal rate while inpatient of 58 mL/hr ATC then transition to bolus feeds as tolerated: Vital AF 1.2 bolus via syringe 6x/day 237 mL w/ 50 mL free water flush before and after each bolus. See EN/PN flowsheet for macro/fluid breakdown of enteral regimens. Will continue to follow for updates. RDN follow up PRN and per protocol.    Nutrition Related Findings:

## 2025-07-10 NOTE — PROGRESS NOTES
[facility]  IN-PATIENT SERVICE   [facility] - Location: [place]    Progress Note    7/10/2025    10:57 AM    Name:   Prince Gutierrez  MRN:     [id number]     Acct:      [id number]   Room:   [facility]331-01   Day:  2  Admit Date:  7/8/2025 11:20 AM    PCP:   Mulugeta Bruce MD  Code Status:  Full Code    Subjective:     Patient doing okay considering shockable recent information to him and family.  The morphine helps his abdominal pain.  This morning he was having lower abdominal pain which is new.  Very little stools and very little p.o. intake.  He had a little loose stool yesterday.  No nausea vomiting chest pain or shortness of breath.  Daughter reports he did appear weak getting to the bathroom.  He is getting up with PT and using walker.  Plans for EGD and possible GR J-tube placement this afternoon with surgery and Port-A-Cath placement    Medications:     Allergies:    Allergies   Allergen Reactions    Ropinirole Other (See Comments)     Dizziness - syncope       Current Meds:   Scheduled Meds:    cyanocobalamin  1,000 mcg IntraMUSCular Daily    dexAMETHasone  4 mg Oral Q8H    sodium chloride flush  5-40 mL IntraVENous 2 times per day    glycopyrrolate  0.4 mg IntraVENous Once    sodium chloride flush  5-40 mL IntraVENous 2 times per day    famotidine  20 mg Oral BID    pantoprazole (PROTONIX) 40 mg in sodium chloride (PF) 0.9 % 10 mL injection  40 mg IntraVENous Daily    tamsulosin  0.4 mg Oral Daily    atorvastatin  80 mg Oral Nightly    latanoprost  1 drop Both Eyes Nightly    levothyroxine  200 mcg Oral Daily    magnesium oxide  200 mg Oral Daily    melatonin  3 mg Oral Nightly    metoprolol succinate  50 mg Oral Daily    therapeutic multivitamin-minerals  1 tablet Oral Daily    primidone  100 mg Oral BID    primidone  50 mg Oral QHS    sodium chloride  80 mL IntraVENous Once     Continuous Infusions:    sodium chloride      sodium chloride       PRN Meds: morphine, naloxone,

## 2025-07-10 NOTE — DISCHARGE INSTR - COC
Continuity of Care Form    Patient Name: Prince Gutierrez   :  1945  MRN:  [id number]    Admit date:  2025  Discharge date:  25      Code Status Order: Full Code   Advance Directives:    Date/Time Healthcare Directive Type of Healthcare Directive Copy in Chart Healthcare Agent Appointed Healthcare Agent's Name Healthcare Agent's Phone Number    25 [phone] Yes, patient has an advance directive for healthcare treatment  Living will;Durable power of  for health care  --  Spouse  Prisca Gutierrez wife  --             Admitting Physician:  Mulugeta Bruce MD  PCP: Mulugeta Bruce MD    Discharging Nurse: Cristine JENNINGS  Discharging Hospital Unit/Room#: 331/331-01  Discharging Unit Phone Number: [phone]    Emergency Contact:   Extended Emergency Contact Information  Primary Emergency Contact: Prisca Gutierrez  Home Phone: [phone]  Mobile Phone: [phone]  Relation: Spouse  Secondary Emergency Contact: Ira Smiley  Home Phone: [phone]  Mobile Phone: [phone]  Relation: Child    Past Surgical History:  Past Surgical History:   Procedure Laterality Date    CARDIOVERSION  2022    CATARACT EXTRACTION Right 2024    CHOLECYSTECTOMY      COLONOSCOPY  2015    luz marina, repeat 10yrs    CORONARY ANGIOPLASTY WITH STENT PLACEMENT  2022    Jeison, LAD 80-90%, 1st diag 50%, 2nd diag 60%, 1st obtuse 90%, RCA 60%, EF 55-60%    CORONARY ARTERY BYPASS GRAFT  02/2022    x3, Dr. Salas, [facility]    EYE SURGERY Right 2024    EYE CATARACT EMULSIFICATION INTRAOCULAR LENS IMPLANT RIGHT performed by Garry Bright MD at [facility] OR    JOINT REPLACEMENT Left     hip    TONSILLECTOMY      TOTAL HIP ARTHROPLASTY Right 10/25/2023    RIGHT HIP ANTERIOR TOTAL ARTHROPLASTY WITH BIOMET AND PRE-OP CPNB    TOTAL HIP ARTHROPLASTY Right 10/25/2023    RIGHT HIP ANTERIOR TOTAL ARTHROPLASTY WITH BIOMET AND PRE-OP CPNB performed by Howard Knott MD at [facility] OR       Immunization History:

## 2025-07-10 NOTE — ANESTHESIA PRE PROCEDURE
tablet Take 1 tablet by mouth daily 7/10/23  Yes ProviderMarva MD   traZODone (DESYREL) 50 MG tablet TAKE 1 OR 2 TABS AT BEDTIME AS NEEDED FOR SLEEP  Patient not taking: Reported on 7/8/2025 12/18/24   Percy Thompson MD   calcium 600 MG TABS tablet Take 1 tablet by mouth daily  Patient not taking: Reported on 7/8/2025 7/26/16   ProviderMarva MD   Handicap Placard MISC by Does not apply route Handicap placard to be given for 6 (six) months. 1/18/24   Howard Knott MD       Current medications:    Current Facility-Administered Medications   Medication Dose Route Frequency Provider Last Rate Last Admin   • cyanocobalamin injection 1,000 mcg  1,000 mcg IntraMUSCular Daily Robert Hartman APRN - CNP   1,000 mcg at 07/10/25 1229   • morphine (PF) injection 2 mg  2 mg IntraVENous Q4H PRN Juaquin Vidal MD   2 mg at 07/10/25 0859   • naloxone (NARCAN) injection 0.4 mg  0.4 mg IntraVENous PRN Juaquin Vidal MD       • dexAMETHasone (DECADRON) Oral 4 mg  4 mg Oral Q8H Juaquin Vidal MD   4 mg at 07/10/25 0919   • naloxone 0.4 mg in 10 mL sodium chloride syringe   IntraVENous PRN Oscar Dunn MD       • sodium chloride flush 0.9 % injection 5-40 mL  5-40 mL IntraVENous 2 times per day Oscar Dunn MD   10 mL at 07/10/25 0920   • sodium chloride flush 0.9 % injection 5-40 mL  5-40 mL IntraVENous PRN Oscar Dunn MD       • 0.9 % sodium chloride infusion   IntraVENous PRN Oscar Dunn MD       • meperidine (DEMEROL) injection 12.5 mg  12.5 mg IntraVENous Q5 Min PRN Oscar Dunn MD       • morphine (PF) injection 2 mg  2 mg IntraVENous Q5 Min PRN Oscar Dunn MD       • HYDROmorphone (DILAUDID) injection 0.5 mg  0.5 mg IntraVENous Q5 Min PRN Oscar Dunn MD   0.5 mg at 07/09/25 7094   • ondansetron (ZOFRAN) injection 4 mg  4 mg IntraVENous Once PRN Oscar Dunn MD       • metoclopramide (REGLAN) injection 10 mg  10 mg IntraVENous Once PRN Mona,

## 2025-07-10 NOTE — PROGRESS NOTES
Surgical Progress Note    Chief complaint: Mild abdominal pain    HPI: The patient has suspected metastatic gastric cancer.  EGD yesterday by GI demonstrated a friable mass at the GE junction.  The patient has brain metastases in addition to liver lesions.  He unfortunately is surgically unresectable.  The patient has had progressive weight loss since hospitalization.  Long-term feeding access as well as intravenous access for potential chemotherapy have been requested.  The patient describes some mild intermittent abdominal discomfort.  He has been receiving morphine intermittently.  He has not had any documented fevers or chills.  He is mildly anemic.    Patient doing fair    Vitals:    07/09/25 2341   BP: 116/71   Pulse: 66   Resp: 16   Temp: 97.5 °F (36.4 °C)   SpO2: 95%        Review of Systems - History obtained from chart review and the patient  General ROS: negative for - chills or fever  Psychological ROS: negative for - disorientation, hallucinations, or hostility  Ophthalmic ROS: negative for - dry eyes or eye pain  ENT ROS: negative for - epistaxis or oral lesions  Allergy and Immunology ROS: negative for - hives or itchy/watery eyes  Hematological and Lymphatic ROS: positive for - fatigue and weight loss  Endocrine ROS: negative for -palpitations, polyuria  Respiratory ROS: no cough, shortness of breath, or wheezing  Cardiovascular ROS: no chest pain or dyspnea on exertion  Gastrointestinal ROS: positive for - abdominal pain and appetite loss  Genito-Urinary ROS: no dysuria, trouble voiding, or hematuria  Musculoskeletal ROS: positive for - joint pain and joint stiffness  Neurological ROS: no TIA or stroke symptoms  Dermatological ROS: negative for - pruritus, rash, or skin lesion changes    Exam: General Appearance: alert and oriented to person, place and time, well-developed and well-nourished, in no acute distress  Skin: warm and dry, no rash or erythema  Head: normocephalic and atraumatic  Eyes:

## 2025-07-10 NOTE — PROGRESS NOTES
Spiritual Health History and Assessment/Progress Note  [facility]    (P) Follow-up,  ,  ,      Name: Prince Gutierrez MRN: [id number]    Age: 80 y.o.     Sex: male   Language: English   Yarsani: Synagogue   Metastasis from gastric cancer (HCC)     Date: 7/10/2025            Total Time Calculated: (P) 10 min              Spiritual Assessment began in [facility]        Referral/Consult From: (P) Rounding   Encounter Overview/Reason: (P) Follow-up  Service Provided For: (P) Patient and family together    Writer met w/ pt and pt's family. Pt shared that he received difficult news from doctor earlier today. Pt expressed gratitude for family as well as the many people praying for him. Pt said that his parish  visited earlier today and administered Sacrament of the Sick. Writer provided a ministry presence and engaged in conversation with pt and family. Writer assured pt and family of continued support/prayers.     Abbey, Belief, Meaning:   Patient identifies as spiritual and is connected with a abbey tradition or spiritual practice  Family/Friends identify as spiritual and are connected with a abbey tradition or spiritual practice      Importance and Influence:  Patient has spiritual/personal beliefs that influence decisions regarding their health  Family/Friends have spiritual/personal beliefs that influence decisions regarding the patient's health    Community:  Patient feels well-supported. Support system includes: Spouse/Partner and Children  Family/Friends feel well-supported. Support system includes: Extended family    Assessment and Plan of Care:     Patient Interventions include: Facilitated expression of thoughts and feelings, Explored spiritual coping/struggle/distress, and Affirmed coping skills/support systems  Family/Friends Interventions include: Facilitated expression of thoughts and feelings    Patient Plan of Care: Spiritual Care available upon further referral  Family/Friends

## 2025-07-10 NOTE — CARE COORDINATION
Case Management Assessment  Initial Evaluation    Date/Time of Evaluation: 7/10/2025 9:18 AM  Assessment Completed by: Paige Fonseca RN    If patient is discharged prior to next notation, then this note serves as note for discharge by case management.    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 Yes    Chart Reviewed: Yes      History Provided by: Patient  Patient Orientation: Alert and Oriented    Patient Cognition: Alert    Hospitalization in the last 30 days (Readmission):  No    If yes, Readmission Assessment in CM Navigator will be completed.    Advance Directives:      Code Status: Full Code   Patient's Primary Decision Maker is: Legal Next of Kin    Primary Decision Maker: Prisca Gutierrez - Spouse - [phone]    Discharge Planning:    Patient lives with: Spouse/Significant Other Type of Home: House  Primary Care Giver: Self  Patient Support Systems include: Family Members, Children   Current Financial resources: Medicare  Current community resources: None  Current services prior to admission: Durable Medical Equipment            Current DME: Cane, Walker, Shower Chair            Type of Home Care services:  None    ADLS  Prior functional level: Independent in ADLs/IADLs  Current functional level: Assistance with the following:, Other (see comment) (Await PT/OT eval)    PT AM-PAC: 19 /24  OT AM-PAC: 19 /24    Family can provide assistance at DC: Yes  Would you like Case Management to discuss the discharge plan with any other family members/significant others, and if so, who? Yes  Plans to Return to Present Housing: Yes  Other Identified

## 2025-07-10 NOTE — PROGRESS NOTES
GI Progress notes    7/10/2025   10:16 AM    Name:  Prince Gutierrez  MRN:    [id number]     Acct:     [id number]   Room:  [address] [place] [address] Day: 2     Admit Date: 7/8/2025 11:20 AM  PCP: Mulugeta Bruce MD    Subjective:     C/C: No chief complaint on file.      Interval History: Status: not changed.     Patient seen and examined.  No acute events overnight.  S/p EGD yesterday revealing large mass with necrosis at gastric cardia and fundus,  bx obtained r/o- GIST     ROS:  Constitutional: negative for chills, fevers and sweats  Respiratory: negative for cough, dyspnea on exertion, hemoptysis, shortness of breath and wheezing  Cardiovascular: negative for chest pain, chest pressure/discomfort, dyspnea, lower extremity edema and palpitations  Gastrointestinal: negative for abdominal pain, constipation, diarrhea, nausea and vomiting  Neurological: negative for dizziness and headaches    Medications:     Allergies:   Allergies   Allergen Reactions    Ropinirole Other (See Comments)     Dizziness - syncope       Current Meds: morphine (PF) injection 2 mg, Q4H PRN  naloxone (NARCAN) injection 0.4 mg, PRN  dexAMETHasone (DECADRON) Oral 4 mg, Q8H  naloxone 0.4 mg in 10 mL sodium chloride syringe, PRN  sodium chloride flush 0.9 % injection 5-40 mL, 2 times per day  sodium chloride flush 0.9 % injection 5-40 mL, PRN  0.9 % sodium chloride infusion, PRN  meperidine (DEMEROL) injection 12.5 mg, Q5 Min PRN  morphine (PF) injection 2 mg, Q5 Min PRN  HYDROmorphone (DILAUDID) injection 0.5 mg, Q5 Min PRN  ondansetron (ZOFRAN) injection 4 mg, Once PRN  metoclopramide (REGLAN) injection 10 mg, Once PRN  midazolam PF (VERSED) IntraVENous 2 mg, Once PRN  diphenhydrAMINE (BENADRYL) injection 12.5 mg, Once PRN  labetalol (NORMODYNE;TRANDATE) injection 10 mg, Q15 Min PRN   Or  hydrALAZINE (APRESOLINE) injection 10 mg, Q15 Min PRN  ipratropium 0.5 mg-albuterol 2.5 mg (DUONEB) nebulizer solution 1 Dose, Once PRN  oxyCODONE-acetaminophen  nodular wall thickening left lateral wall of the distal esophagus measuring up to 9 mm extending into the gastric cardia. Thoracic Spine & Chest Wall: Mild degenerative changes with normal thoracic vertebral body heights. No destructive lytic or sclerotic lesions. Status post anterior sternotomy Other Lines/Tubes/Devices/Hardware: Intact sternotomy wires. Visualized Upper Abdomen: Status post cholecystectomy. Large mildly hypoattenuating lesion right hepatic lobe measuring 6.7 cm. Hypoattenuating 12 mm left hepatic lobe lesion. Masslike thickening of the gastroesophageal junction/gastric cardia with stranding of the surrounding fat. Enlarged gastrohepatic interval lymph nodes measuring up to 20 mm. Nodular thickening of the left adrenal gland partially imaged measuring 18 mm in the transverse plane. _______________________________    IMPRESSION: 1. No acute pulmonary embolism. 2. Two indeterminate pulmonary nodules. 3. Dilation of the proximal descending aorta measuring 36 mm. 4. Masslike thickening of the gastroesophageal junction/gastric cardia, surrounding fat stranding, enlarged gastrohepatic lymph nodes and hepatic lesions better evaluated on recent CT abdomen/pelvis. _______________________________    XR CHEST (2 VW)  Result Date: 6/30/2025  PROCEDURE: CHEST, TWO VIEWS (PA and Lateral), 06/30/2025 10:27 hours CLINICAL INDICATION: sob, known ge junction mass COMPARISON: None    IMPRESSION: 1. Streaky retrocardiac left lower lobe opacity possibly atelectasis, scar or infection. Lungs otherwise clear. No, pleural effusion, or pneumothorax. 2. Normal heart size and mediastinal contour. 3. Intact median sternotomy wires. _______________________________    CTA CHEST ABDOMEN PELVIS W CONTRAST  Result Date: 6/23/2025  EXAM: CTA CHEST, ABDOMEN AND PELVIS WITH AND WITHOUT AND WITH CONTRAST 06/23/2025 10:06:00 AM TECHNIQUE: CTA of the chest was performed with and without the administration of intravenous contrast. CTA of

## 2025-07-10 NOTE — PROGRESS NOTES
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 Metastases involving the liver, left adrenal gland, and involving a periaortic lymph node. Area of nodularity in the left lower lobe   periphery which may also represent a metastasis  -CTH shows 1.5 cm heterogeneous cortical and subcortical mass in the right parietal lobe  with associated vasogenic edema consistent with metastatic lesion  -MRI brain shows at least 6 brain metastases with internal microhemorrhage  -Dexamethasone started (IV given decreased swallow ability)  -S/p EGD w/ biopsy 7/9/25--> await pathology  -IR consulted for liver mass biopsy, not yet completed. Due to Eliquis, cannot be done until Monday  -Tumor markers not useful  -General surgery planning port placement, PEG vs J tube placement  -Will need radiation oncology consultation likely as outpatient   -Further recommendations forthcoming pending pathology     Difficulty swallowing, Malnutrition  -Secondary to GE junction stenosis versus gastric outlet obstruction, in setting of above  -General surgery planning J tube vs PEG tube placement  -Nutritional services following    Deconditioning/debility  -In setting of advanced malignancy and decreased nutrition  -PT/OT    Anemia  -Mild. Secondary to B12 deficiency and bleeding gastroesophageal mass  -Hgb=13.5(12.8, 12.4, 12.2) normocytic  -Started IM B12 replacement  -No other nutritional deficiencies noted. Hemolysis not suspected  -Eliquis presently on hold for procedures. Would have no objection to resuming when/if cleared by GI/surgery  -Transfuse for hgb<7    Afib  -Eliquis presently on hold. Await  02/21/2025       Medications:      cyanocobalamin  1,000 mcg IntraMUSCular Daily    dexAMETHasone  4 mg Oral Q8H    sodium chloride flush  5-40 mL IntraVENous 2 times per day    glycopyrrolate  0.4 mg IntraVENous Once    sodium chloride flush  5-40 mL IntraVENous 2 times per day    famotidine  20 mg Oral BID    pantoprazole (PROTONIX) 40 mg in sodium chloride (PF) 0.9 % 10 mL injection  40 mg IntraVENous Daily    tamsulosin  0.4 mg Oral Daily    atorvastatin  80 mg Oral Nightly    latanoprost  1 drop Both Eyes Nightly    levothyroxine  200 mcg Oral Daily    magnesium oxide  200 mg Oral Daily    melatonin  3 mg Oral Nightly    metoprolol succinate  50 mg Oral Daily    therapeutic multivitamin-minerals  1 tablet Oral Daily    primidone  100 mg Oral BID    primidone  50 mg Oral QHS    sodium chloride  80 mL IntraVENous Once     DOMO Naidu - CNP  [facility] Hematology and Oncology

## 2025-07-10 NOTE — PROGRESS NOTES
Physical Therapy        Physical Therapy Cancel Note      DATE: 7/10/2025    NAME: Prince Gutierrez  MRN: [id number]   : 1945      Patient not seen this date for Physical Therapy due to:    Surgery/Procedure: OR for PEG/port placement today.  Will continue to pursue for PT treatment as able beginning 25.      Electronically signed by Johana Hurst PT on 7/10/2025 at 3:26 PM

## 2025-07-10 NOTE — PROGRESS NOTES
Occupational Therapy    [facility]  Occupational Therapy Not Seen Note    DATE: 7/10/2025    NAME: Prince Gutierrez  MRN: [id number]   : 1945      Patient not seen this date for Occupational Therapy due to:    Surgery/Procedure:OR for PEG/port placement today, Will continue to follow.    Next Scheduled Treatment: 25    Electronically signed by HUGO GUILLORY on 7/10/2025 at 3:02 PM

## 2025-07-11 LAB
ALBUMIN SERPL-MCNC: 3.6 G/DL (ref 3.5–5.2)
ALBUMIN/GLOB SERPL: 1.2 {RATIO} (ref 1–2.5)
ALP SERPL-CCNC: 134 U/L (ref 40–129)
ALT SERPL-CCNC: 18 U/L (ref 10–50)
ANION GAP SERPL CALCULATED.3IONS-SCNC: 13 MMOL/L (ref 9–16)
AST SERPL-CCNC: 54 U/L (ref 10–50)
BASOPHILS # BLD: 0 K/UL (ref 0–0.2)
BASOPHILS NFR BLD: 0 % (ref 0–2)
BILIRUB SERPL-MCNC: 0.5 MG/DL (ref 0–1.2)
BUN SERPL-MCNC: 23 MG/DL (ref 8–23)
CALCIUM SERPL-MCNC: 10.6 MG/DL (ref 8.6–10.4)
CHLORIDE SERPL-SCNC: 102 MMOL/L (ref 98–107)
CO2 SERPL-SCNC: 23 MMOL/L (ref 20–31)
CREAT SERPL-MCNC: 0.9 MG/DL (ref 0.7–1.2)
EOSINOPHIL # BLD: 0.2 K/UL (ref 0–0.4)
EOSINOPHILS RELATIVE PERCENT: 2 % (ref 1–4)
ERYTHROCYTE [DISTWIDTH] IN BLOOD BY AUTOMATED COUNT: 13.1 % (ref 12.5–15.4)
GFR, ESTIMATED: 86 ML/MIN/1.73M2
GLUCOSE SERPL-MCNC: 116 MG/DL (ref 74–99)
HCT VFR BLD AUTO: 39.1 % (ref 41–53)
HGB BLD-MCNC: 13.1 G/DL (ref 13.5–17.5)
LYMPHOCYTES NFR BLD: 0.5 K/UL (ref 1–4.8)
LYMPHOCYTES RELATIVE PERCENT: 7 % (ref 24–44)
MCH RBC QN AUTO: 29.3 PG (ref 26–34)
MCHC RBC AUTO-ENTMCNC: 33.6 G/DL (ref 31–37)
MCV RBC AUTO: 87.1 FL (ref 80–100)
MONOCYTES NFR BLD: 0.5 K/UL (ref 0.1–1.2)
MONOCYTES NFR BLD: 7 % (ref 2–11)
NEUTROPHILS NFR BLD: 84 % (ref 36–66)
NEUTS SEG NFR BLD: 6.8 K/UL (ref 1.8–7.7)
PLATELET # BLD AUTO: 229 K/UL (ref 140–450)
PMV BLD AUTO: 7.8 FL (ref 6–12)
POTASSIUM SERPL-SCNC: 4.3 MMOL/L (ref 3.7–5.3)
PROT SERPL-MCNC: 6.7 G/DL (ref 6.6–8.7)
RBC # BLD AUTO: 4.48 M/UL (ref 4.5–5.9)
SODIUM SERPL-SCNC: 138 MMOL/L (ref 136–145)
WBC OTHER # BLD: 8 K/UL (ref 3.5–11)

## 2025-07-11 PROCEDURE — 6370000000 HC RX 637 (ALT 250 FOR IP): Performed by: SURGERY

## 2025-07-11 PROCEDURE — 97116 GAIT TRAINING THERAPY: CPT

## 2025-07-11 PROCEDURE — 2500000003 HC RX 250 WO HCPCS: Performed by: SURGERY

## 2025-07-11 PROCEDURE — 2060000000 HC ICU INTERMEDIATE R&B

## 2025-07-11 PROCEDURE — 36415 COLL VENOUS BLD VENIPUNCTURE: CPT

## 2025-07-11 PROCEDURE — 6360000002 HC RX W HCPCS: Performed by: SURGERY

## 2025-07-11 PROCEDURE — 97530 THERAPEUTIC ACTIVITIES: CPT

## 2025-07-11 PROCEDURE — 85025 COMPLETE CBC W/AUTO DIFF WBC: CPT

## 2025-07-11 PROCEDURE — 6360000002 HC RX W HCPCS: Performed by: INTERNAL MEDICINE

## 2025-07-11 PROCEDURE — 99232 SBSQ HOSP IP/OBS MODERATE 35: CPT | Performed by: FAMILY MEDICINE

## 2025-07-11 PROCEDURE — 80053 COMPREHEN METABOLIC PANEL: CPT

## 2025-07-11 RX ORDER — DEXAMETHASONE SODIUM PHOSPHATE 4 MG/ML
4 INJECTION, SOLUTION INTRA-ARTICULAR; INTRALESIONAL; INTRAMUSCULAR; INTRAVENOUS; SOFT TISSUE EVERY 6 HOURS
Status: DISCONTINUED | OUTPATIENT
Start: 2025-07-11 | End: 2025-07-17 | Stop reason: HOSPADM

## 2025-07-11 RX ORDER — OXYCODONE HYDROCHLORIDE 5 MG/1
5 TABLET ORAL EVERY 4 HOURS PRN
Refills: 0 | Status: DISCONTINUED | OUTPATIENT
Start: 2025-07-11 | End: 2025-07-17 | Stop reason: HOSPADM

## 2025-07-11 RX ADMIN — PRIMIDONE 50 MG: 50 TABLET ORAL at 22:00

## 2025-07-11 RX ADMIN — LATANOPROST 1 DROP: 50 SOLUTION OPHTHALMIC at 22:00

## 2025-07-11 RX ADMIN — CYANOCOBALAMIN 1000 MCG: 1000 INJECTION, SOLUTION INTRAMUSCULAR; SUBCUTANEOUS at 10:34

## 2025-07-11 RX ADMIN — DEXAMETHASONE SODIUM PHOSPHATE 4 MG: 10 INJECTION INTRAMUSCULAR; INTRAVENOUS at 06:05

## 2025-07-11 RX ADMIN — FAMOTIDINE 20 MG: 20 TABLET, FILM COATED ORAL at 10:15

## 2025-07-11 RX ADMIN — DEXAMETHASONE SODIUM PHOSPHATE 4 MG: 4 INJECTION, SOLUTION INTRA-ARTICULAR; INTRALESIONAL; INTRAMUSCULAR; INTRAVENOUS; SOFT TISSUE at 18:37

## 2025-07-11 RX ADMIN — SODIUM CHLORIDE, PRESERVATIVE FREE 10 ML: 5 INJECTION INTRAVENOUS at 12:40

## 2025-07-11 RX ADMIN — Medication 1 TABLET: at 10:15

## 2025-07-11 RX ADMIN — Medication 3 MG: at 22:14

## 2025-07-11 RX ADMIN — SODIUM CHLORIDE, PRESERVATIVE FREE 10 ML: 5 INJECTION INTRAVENOUS at 22:01

## 2025-07-11 RX ADMIN — SODIUM CHLORIDE, PRESERVATIVE FREE 40 MG: 5 INJECTION INTRAVENOUS at 09:45

## 2025-07-11 RX ADMIN — ATORVASTATIN CALCIUM 80 MG: 40 TABLET, FILM COATED ORAL at 22:01

## 2025-07-11 RX ADMIN — DEXAMETHASONE SODIUM PHOSPHATE 4 MG: 4 INJECTION, SOLUTION INTRA-ARTICULAR; INTRALESIONAL; INTRAMUSCULAR; INTRAVENOUS; SOFT TISSUE at 12:48

## 2025-07-11 RX ADMIN — FAMOTIDINE 20 MG: 20 TABLET, FILM COATED ORAL at 22:01

## 2025-07-11 RX ADMIN — PRIMIDONE 100 MG: 50 TABLET ORAL at 10:15

## 2025-07-11 RX ADMIN — LEVOTHYROXINE SODIUM 200 MCG: 125 TABLET ORAL at 06:06

## 2025-07-11 RX ADMIN — METOPROLOL SUCCINATE 50 MG: 50 TABLET, EXTENDED RELEASE ORAL at 10:15

## 2025-07-11 RX ADMIN — TAMSULOSIN HYDROCHLORIDE 0.4 MG: 0.4 CAPSULE ORAL at 10:14

## 2025-07-11 RX ADMIN — MAGNESIUM GLUCONATE 500 MG ORAL TABLET 200 MG: 500 TABLET ORAL at 10:15

## 2025-07-11 RX ADMIN — SODIUM CHLORIDE, PRESERVATIVE FREE 10 ML: 5 INJECTION INTRAVENOUS at 06:07

## 2025-07-11 RX ADMIN — PRIMIDONE 100 MG: 50 TABLET ORAL at 14:27

## 2025-07-11 NOTE — PROGRESS NOTES
[facility] Medicine  IN-PATIENT SERVICE   [facility]    Progress Note    2025    10:40 AM    Name:   Prince Gutierrez  MRN:     [id number]     Acct:      [id number]   Room:   [address] [place] [address] Day:  3  Admit Date:  2025 11:20 AM    PCP:   Mulugeta Bruce MD  Code Status:  Full Code    Subjective:     Patient is doing well today. He has PEG tube placed yesterday. Plans to start tube feeding today.  He does have some pain from surgery and has had prn morphine.  He is ambulating with walker.      Medications:     Allergies:    Allergies   Allergen Reactions    Ropinirole Other (See Comments)     Dizziness - syncope       Current Meds:   Scheduled Meds:    cyanocobalamin  1,000 mcg IntraMUSCular Daily    dexAMETHasone  4 mg Oral Q8H    sodium chloride flush  5-40 mL IntraVENous 2 times per day    famotidine  20 mg Oral BID    pantoprazole (PROTONIX) 40 mg in sodium chloride (PF) 0.9 % 10 mL injection  40 mg IntraVENous Daily    tamsulosin  0.4 mg Oral Daily    atorvastatin  80 mg Oral Nightly    latanoprost  1 drop Both Eyes Nightly    levothyroxine  200 mcg Oral Daily    magnesium oxide  200 mg Oral Daily    melatonin  3 mg Oral Nightly    metoprolol succinate  50 mg Oral Daily    therapeutic multivitamin-minerals  1 tablet Oral Daily    primidone  100 mg Oral BID    primidone  50 mg Oral QHS    sodium chloride  80 mL IntraVENous Once     Continuous Infusions:    sodium chloride       PRN Meds: oxyCODONE, morphine, naloxone, sodium chloride flush, sodium chloride, potassium chloride **OR** potassium alternative oral replacement **OR** potassium chloride, magnesium sulfate, ondansetron **OR** ondansetron, polyethylene glycol, acetaminophen **OR** acetaminophen, sodium chloride flush    Data:     Vitals:  /72   Pulse 71   Temp 98.2 °F (36.8 °C) (Oral)   Resp 16   Ht 1.88 m (6' 2.02\")   Wt 83.5 kg (184 lb 1.4 oz)   SpO2 95%   BMI 23.61 kg/m²   Temp (24hrs), Av.9

## 2025-07-11 NOTE — PLAN OF CARE
Problem: Safety - Adult  Goal: Free from fall injury  7/11/2025 0118 by Tena Hernandez, RN  Outcome: Progressing   No falls/injuries this shift, bed in lowest position, brakes on, bed alarm on, call light in reach, side rails up x2  Problem: Gastrointestinal - Adult  Goal: Minimal or absence of nausea and vomiting  7/11/2025 0118 by Tena Hernandez, RN  Outcome: Progressing   No nausea/vomiting this shift  Problem: Pain  Goal: Verbalizes/displays adequate comfort level or baseline comfort level  7/11/2025 0118 by Tena eHrnandez, RN  Outcome: Progressing   No new signs/symptoms of pain noted, pain rating < 3 on scale 0-10, pain controlled with medication/repositioning  Problem: Nutrition Deficit:  Goal: Optimize nutritional status  7/11/2025 0118 by Tena Hernandez, RN  Outcome: Progressing   PEG tube insertion completed today, tube feeding to start 07/11/2025 in AM

## 2025-07-11 NOTE — PROGRESS NOTES
Surgical Progress Note    POD: 1    Chief complaint:  No acute events overnight.  The patient states that his pain has been reasonably controlled.  Plans for tube feeds to start this morning.  We will initially begin trickle tube feeds and hopefully can transitioned to bolus feeds by the time he is discharge.  Nursing instruction is being provided regarding PEG tube site care.    Patient doing fairly well    Vitals:    07/11/25 0009   BP: 115/65   Pulse: 80   Resp: 16   Temp: 97.7 °F (36.5 °C)   SpO2: 93%        Review of Systems - History obtained from chart review and the patient    Exam: General Appearance: alert and oriented to person, place and time, well-developed and well-nourished, in no acute distress  Skin: warm and dry, no rash or erythema,  skin glue intact at port catheter insertion site  Head: normocephalic and atraumatic  Eyes: pupils equal, round, and reactive to light, extraocular eye movements intact, conjunctivae normal  ENT: hearing grossly normal bilaterally  Neck: neck supple and non tender without mass, no thyromegaly or thyroid nodules, no cervical lymphadenopathy   Pulmonary/Chest: clear to auscultation bilaterally- no wheezes, rales or rhonchi, normal air movement, no respiratory distress  Cardiovascular: normal rate and normal S1 and S2  Abdomen:   Soft, appropriately tender, surgical incisions clean/dry /intact, bowel sounds active    I/O last 3 completed shifts:  In: 530 [P.O.:30; I.V.:500]  Out: -     Hemoglobin   Date Value Ref Range Status   07/10/2025 13.5 13.5 - 17.5 g/dL Final     Hematocrit   Date Value Ref Range Status   07/10/2025 39.6 (L) 41 - 53 % Final     WBC   Date Value Ref Range Status   07/10/2025 6.1 3.5 - 11.0 k/uL Final     Sodium   Date Value Ref Range Status   07/10/2025 137 136 - 145 mmol/L Final     Potassium   Date Value Ref Range Status   07/10/2025 4.2 3.7 - 5.3 mmol/L Final     Chloride   Date Value Ref Range Status   07/10/2025 102 98 - 107 mmol/L Final

## 2025-07-11 NOTE — PLAN OF CARE
Problem: Discharge Planning  Goal: Discharge to home or other facility with appropriate resources  Outcome: Progressing  Flowsheets  Taken 7/11/2025 1632  Discharge to home or other facility with appropriate resources:   Identify barriers to discharge with patient and caregiver   Arrange for needed discharge resources and transportation as appropriate   Identify discharge learning needs (meds, wound care, etc)   Refer to discharge planning if patient needs post-hospital services based on physician order or complex needs related to functional status, cognitive ability or social support system  Taken 7/11/2025 1235  Discharge to home or other facility with appropriate resources:   Identify barriers to discharge with patient and caregiver   Arrange for needed discharge resources and transportation as appropriate   Identify discharge learning needs (meds, wound care, etc)   Refer to discharge planning if patient needs post-hospital services based on physician order or complex needs related to functional status, cognitive ability or social support system  Taken 7/11/2025 0745  Discharge to home or other facility with appropriate resources:   Identify barriers to discharge with patient and caregiver   Arrange for needed discharge resources and transportation as appropriate   Identify discharge learning needs (meds, wound care, etc)   Refer to discharge planning if patient needs post-hospital services based on physician order or complex needs related to functional status, cognitive ability or social support system     Problem: Safety - Adult  Goal: Free from fall injury  Outcome: Progressing     Problem: ABCDS Injury Assessment  Goal: Absence of physical injury  Outcome: Progressing     Problem: Gastrointestinal - Adult  Goal: Minimal or absence of nausea and vomiting  Outcome: Progressing  Flowsheets  Taken 7/11/2025 1632  Minimal or absence of nausea and vomiting:   Provide nonpharmacologic comfort measures as

## 2025-07-11 NOTE — PROGRESS NOTES
Physical Therapy  Facility/Department: [address]   Physical Therapy Daily Treatment Note    Patient Name: Prince Gutierrez        MRN: [id number]    : 1945    Date of Service: 2025    No chief complaint on file.    Past Medical History:  has a past medical history of Arthritis, Atrial fibrillation (HCC), BPH (benign prostatic hyperplasia), CAD (coronary artery disease), Essential tremor, Glaucoma, Hyperlipidemia, Hypertension, Hypothyroid, Scoliosis, TIA (transient ischemic attack), and Under care of team.  Past Surgical History:  has a past surgical history that includes Coronary artery bypass graft (2022); joint replacement (Left); Tonsillectomy; Coronary angioplasty with stent (2022); Cholecystectomy; Colonoscopy (2015); Total hip arthroplasty (Right, 10/25/2023); Total hip arthroplasty (Right, 10/25/2023); Cardioversion (2022); Eye surgery (Right, 2024); Cataract extraction (Right, 2024); Upper gastrointestinal endoscopy (N/A, 2025); Upper gastrointestinal endoscopy (N/A, 7/10/2025); and Port Surgery (N/A, 7/10/2025).    Discharge Recommendations  Discharge Recommendations: Patient would benefit from continued therapy after discharge  PT Equipment Recommendations  Equipment Needed: No  Other: has RW    Assessment  Body Structures, Functions, Activity Limitations Requiring Skilled Therapeutic Intervention: Decreased functional mobility ;Decreased balance;Decreased safe awareness;Decreased endurance  Assessment: The patient was admitted due to SOB and upper abdominal pain. At baseline, the patient lives wife and requires no assistance with ADLs and functional mobility with gait no device, but in last few weeks began using RW per daughter's (who is a PT) request as pt decreased appetite and loss of weight. The patient resides in 2 story home w/ basement with bedroom on 2nd floor but pt sleeping in recliner due to abdominal symptoms; does have access to 1st floor bed. During

## 2025-07-11 NOTE — PROGRESS NOTES
Pt scheduled for IR guided liver biopsy on Monday July 14th at 10am. Transport to be requested by CM.     Pt will need to be NPO at midnight prior and IV access for procedure.     Family and Dr. Emery notified.

## 2025-07-11 NOTE — PROGRESS NOTES
Occupational Therapy  Occupational Therapy Daily Treatment Note  Facility/Department: [address]   Patient Name: Prince Gutierrez        MRN: [id number]    : 1945    Date of Service: 2025    Past Medical History:  has a past medical history of Arthritis, Atrial fibrillation (HCC), BPH (benign prostatic hyperplasia), CAD (coronary artery disease), Essential tremor, Glaucoma, Hyperlipidemia, Hypertension, Hypothyroid, Scoliosis, TIA (transient ischemic attack), and Under care of team.  Past Surgical History:  has a past surgical history that includes Coronary artery bypass graft (2022); joint replacement (Left); Tonsillectomy; Coronary angioplasty with stent (2022); Cholecystectomy; Colonoscopy (2015); Total hip arthroplasty (Right, 10/25/2023); Total hip arthroplasty (Right, 10/25/2023); Cardioversion (2022); Eye surgery (Right, 2024); Cataract extraction (Right, 2024); Upper gastrointestinal endoscopy (N/A, 2025); Upper gastrointestinal endoscopy (N/A, 7/10/2025); and Port Surgery (N/A, 7/10/2025).    Discharge Recommendations  Pt will likely not be in need of OT services following discharge  OT Equipment Recommendations  Other: AE/DME recommendations TBD    Assessment  Performance deficits / Impairments: Decreased functional mobility ;Decreased ADL status;Decreased ROM;Decreased strength;Decreased safe awareness;Decreased endurance;Decreased balance;Decreased fine motor control;Decreased high-level IADLs    Assessment: Prior to recent admission, the pt was completing all daily acitivities and routnies indpendently. Due to the deficits listed above, the pt is requiring an increased level of assistance to perform ADLs, functional mobility, and transfers. The pt would benefit from skill OT services to improve independence, improve safety, increased engagement in ADLs, and return to OF.    Prognosis: Good  REQUIRES OT FOLLOW-UP: Yes    Activity Tolerance  Activity Tolerance:

## 2025-07-11 NOTE — CARE COORDINATION
Dietitian recommendations and tube feed orders uploaded to [facility] referral for home tube feed. PS message sent to Dr. Lucio notifying that  length of need statement for tube feed is needed from the MD to send to Medicare to make sure he qualifies per Medicare guidelines.

## 2025-07-11 NOTE — PROGRESS NOTES
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 Metastases involving the liver, left adrenal gland, and involving a periaortic lymph node. Area of nodularity in the left lower lobe   periphery which may also represent a metastasis  -CTH shows 1.5 cm heterogeneous cortical and subcortical mass in the right parietal lobe  with associated vasogenic edema consistent with metastatic lesion  -MRI brain shows at least 6 brain metastases with internal microhemorrhage  -Dexamethasone started (IV given decreased swallow ability)  -S/p EGD w/ biopsy 7/9/25--> await pathology  -IR consulted for liver mass biopsy, not yet completed. Due to Eliquis, cannot be done until Monday at [facility]  -Tumor markers not useful  -General surgery placed PEG 7/10  -Will need radiation oncology consultation likely as outpatient   -Further recommendations forthcoming pending pathology     Difficulty swallowing, Malnutrition  -Secondary to GE junction stenosis versus gastric outlet obstruction, in setting of above  -General surgery placed Gtube 7/10, have signed off  -Nutritional services following    Anemia  -Mild. Secondary to B12 deficiency and bleeding gastroesophageal mass  -Hgb=13.1(13.5, 12.8, 12.4, 12.2) normocytic  -Started IM B12 replacement  -No other nutritional deficiencies noted. Hemolysis not suspected  -Eliquis presently on hold for procedures. Would have no objection to resuming when/if cleared by GI/surgery  -Transfuse for hgb<7    Afib  -Eliquis presently on hold. Await GI/surgery clearance to resume  -Rest as per other services    Deconditioning/debility  -In setting of advanced

## 2025-07-11 NOTE — PROGRESS NOTES
IR department contacted regarding liver biopsy. Information given and awaiting call back to confirm schedule for Monday.

## 2025-07-11 NOTE — CARE COORDINATION
Transportation request faxed to [facility] for 9am on 7/14 for 10am IR liver biopsy.

## 2025-07-12 LAB
ALBUMIN SERPL-MCNC: 3.6 G/DL (ref 3.5–5.2)
ALBUMIN/GLOB SERPL: 1.2 {RATIO} (ref 1–2.5)
ALP SERPL-CCNC: 140 U/L (ref 40–129)
ALT SERPL-CCNC: 17 U/L (ref 10–50)
ANION GAP SERPL CALCULATED.3IONS-SCNC: 10 MMOL/L (ref 9–16)
AST SERPL-CCNC: 54 U/L (ref 10–50)
BASOPHILS # BLD: 0 K/UL (ref 0–0.2)
BASOPHILS NFR BLD: 0 % (ref 0–2)
BILIRUB SERPL-MCNC: 0.4 MG/DL (ref 0–1.2)
BUN SERPL-MCNC: 22 MG/DL (ref 8–23)
CALCIUM SERPL-MCNC: 10.3 MG/DL (ref 8.6–10.4)
CHLORIDE SERPL-SCNC: 100 MMOL/L (ref 98–107)
CO2 SERPL-SCNC: 25 MMOL/L (ref 20–31)
CREAT SERPL-MCNC: 0.8 MG/DL (ref 0.7–1.2)
EOSINOPHIL # BLD: 0.1 K/UL (ref 0–0.4)
EOSINOPHILS RELATIVE PERCENT: 2 % (ref 1–4)
ERYTHROCYTE [DISTWIDTH] IN BLOOD BY AUTOMATED COUNT: 12.9 % (ref 12.5–15.4)
GFR, ESTIMATED: 89 ML/MIN/1.73M2
GLUCOSE SERPL-MCNC: 164 MG/DL (ref 74–99)
HCT VFR BLD AUTO: 37.3 % (ref 41–53)
HGB BLD-MCNC: 12.9 G/DL (ref 13.5–17.5)
LYMPHOCYTES NFR BLD: 0.6 K/UL (ref 1–4.8)
LYMPHOCYTES RELATIVE PERCENT: 8 % (ref 24–44)
MCH RBC QN AUTO: 29.8 PG (ref 26–34)
MCHC RBC AUTO-ENTMCNC: 34.7 G/DL (ref 31–37)
MCV RBC AUTO: 86.1 FL (ref 80–100)
MONOCYTES NFR BLD: 0.5 K/UL (ref 0.1–1.2)
MONOCYTES NFR BLD: 6 % (ref 2–11)
NEUTROPHILS NFR BLD: 84 % (ref 36–66)
NEUTS SEG NFR BLD: 6.5 K/UL (ref 1.8–7.7)
PLATELET # BLD AUTO: 196 K/UL (ref 140–450)
PMV BLD AUTO: 7.4 FL (ref 6–12)
POTASSIUM SERPL-SCNC: 4.2 MMOL/L (ref 3.7–5.3)
PROT SERPL-MCNC: 6.7 G/DL (ref 6.6–8.7)
RBC # BLD AUTO: 4.34 M/UL (ref 4.5–5.9)
SODIUM SERPL-SCNC: 135 MMOL/L (ref 136–145)
WBC OTHER # BLD: 7.7 K/UL (ref 3.5–11)

## 2025-07-12 PROCEDURE — 2500000003 HC RX 250 WO HCPCS: Performed by: SURGERY

## 2025-07-12 PROCEDURE — 2060000000 HC ICU INTERMEDIATE R&B

## 2025-07-12 PROCEDURE — 36415 COLL VENOUS BLD VENIPUNCTURE: CPT

## 2025-07-12 PROCEDURE — 85025 COMPLETE CBC W/AUTO DIFF WBC: CPT

## 2025-07-12 PROCEDURE — 6360000002 HC RX W HCPCS: Performed by: SURGERY

## 2025-07-12 PROCEDURE — 6370000000 HC RX 637 (ALT 250 FOR IP): Performed by: FAMILY MEDICINE

## 2025-07-12 PROCEDURE — 6370000000 HC RX 637 (ALT 250 FOR IP): Performed by: SURGERY

## 2025-07-12 PROCEDURE — 80053 COMPREHEN METABOLIC PANEL: CPT

## 2025-07-12 PROCEDURE — 99232 SBSQ HOSP IP/OBS MODERATE 35: CPT | Performed by: FAMILY MEDICINE

## 2025-07-12 PROCEDURE — 6360000002 HC RX W HCPCS: Performed by: INTERNAL MEDICINE

## 2025-07-12 RX ORDER — METOPROLOL TARTRATE 25 MG/1
25 TABLET, FILM COATED ORAL 2 TIMES DAILY
Status: DISCONTINUED | OUTPATIENT
Start: 2025-07-12 | End: 2025-07-17 | Stop reason: HOSPADM

## 2025-07-12 RX ADMIN — PRIMIDONE 100 MG: 50 TABLET ORAL at 15:58

## 2025-07-12 RX ADMIN — METOPROLOL TARTRATE 25 MG: 25 TABLET, FILM COATED ORAL at 09:53

## 2025-07-12 RX ADMIN — SODIUM CHLORIDE, PRESERVATIVE FREE 10 ML: 5 INJECTION INTRAVENOUS at 06:00

## 2025-07-12 RX ADMIN — TAMSULOSIN HYDROCHLORIDE 0.4 MG: 0.4 CAPSULE ORAL at 09:34

## 2025-07-12 RX ADMIN — PRIMIDONE 50 MG: 50 TABLET ORAL at 21:16

## 2025-07-12 RX ADMIN — SODIUM CHLORIDE, PRESERVATIVE FREE 40 MG: 5 INJECTION INTRAVENOUS at 09:34

## 2025-07-12 RX ADMIN — LEVOTHYROXINE SODIUM 200 MCG: 125 TABLET ORAL at 05:59

## 2025-07-12 RX ADMIN — METOPROLOL TARTRATE 25 MG: 25 TABLET, FILM COATED ORAL at 21:16

## 2025-07-12 RX ADMIN — DEXAMETHASONE SODIUM PHOSPHATE 4 MG: 4 INJECTION, SOLUTION INTRA-ARTICULAR; INTRALESIONAL; INTRAMUSCULAR; INTRAVENOUS; SOFT TISSUE at 05:59

## 2025-07-12 RX ADMIN — SODIUM CHLORIDE, PRESERVATIVE FREE 10 ML: 5 INJECTION INTRAVENOUS at 09:34

## 2025-07-12 RX ADMIN — Medication 1 TABLET: at 09:34

## 2025-07-12 RX ADMIN — ATORVASTATIN CALCIUM 80 MG: 40 TABLET, FILM COATED ORAL at 21:16

## 2025-07-12 RX ADMIN — MAGNESIUM GLUCONATE 500 MG ORAL TABLET 200 MG: 500 TABLET ORAL at 09:33

## 2025-07-12 RX ADMIN — FAMOTIDINE 20 MG: 20 TABLET, FILM COATED ORAL at 09:33

## 2025-07-12 RX ADMIN — PRIMIDONE 100 MG: 50 TABLET ORAL at 09:34

## 2025-07-12 RX ADMIN — DEXAMETHASONE SODIUM PHOSPHATE 4 MG: 4 INJECTION, SOLUTION INTRA-ARTICULAR; INTRALESIONAL; INTRAMUSCULAR; INTRAVENOUS; SOFT TISSUE at 19:55

## 2025-07-12 RX ADMIN — Medication 3 MG: at 21:16

## 2025-07-12 RX ADMIN — SODIUM CHLORIDE, PRESERVATIVE FREE 10 ML: 5 INJECTION INTRAVENOUS at 19:55

## 2025-07-12 RX ADMIN — DEXAMETHASONE SODIUM PHOSPHATE 4 MG: 4 INJECTION, SOLUTION INTRA-ARTICULAR; INTRALESIONAL; INTRAMUSCULAR; INTRAVENOUS; SOFT TISSUE at 01:25

## 2025-07-12 RX ADMIN — SODIUM CHLORIDE, PRESERVATIVE FREE 10 ML: 5 INJECTION INTRAVENOUS at 01:26

## 2025-07-12 RX ADMIN — CYANOCOBALAMIN 1000 MCG: 1000 INJECTION, SOLUTION INTRAMUSCULAR; SUBCUTANEOUS at 09:33

## 2025-07-12 RX ADMIN — DEXAMETHASONE SODIUM PHOSPHATE 4 MG: 4 INJECTION, SOLUTION INTRA-ARTICULAR; INTRALESIONAL; INTRAMUSCULAR; INTRAVENOUS; SOFT TISSUE at 13:28

## 2025-07-12 RX ADMIN — FAMOTIDINE 20 MG: 20 TABLET, FILM COATED ORAL at 21:16

## 2025-07-12 RX ADMIN — LATANOPROST 1 DROP: 50 SOLUTION OPHTHALMIC at 21:17

## 2025-07-12 NOTE — PLAN OF CARE
Problem: Discharge Planning  Goal: Discharge to home or other facility with appropriate resources  7/12/2025 0110 by Tena Hernandez RN  Outcome: Progressing   Patient actively participates in ADL's and decision making regarding plan of care  Problem: Safety - Adult  Goal: Free from fall injury  7/12/2025 0110 by Tena Hernandez RN  Outcome: Progressing   No falls/injuries this shift, bed in lowest position, brakes on, bed alarm on, call light in reach, side rails up x2  Problem: Gastrointestinal - Adult  Goal: Minimal or absence of nausea and vomiting  7/12/2025 0110 by Tena Hernandez RN  Outcome: Progressing   No nausea/vomiting this shift  Problem: Pain  Goal: Verbalizes/displays adequate comfort level or baseline comfort level  7/12/2025 0110 by Tena Hernandez RN  Outcome: Progressing   Patient denies pain this shift

## 2025-07-12 NOTE — PROGRESS NOTES
[facility]  IN-PATIENT SERVICE   [facility] - Location: [place]    Progress Note    7/12/2025    10:19 AM    Name:   Prince Gutierrez  MRN:     [id number]     Acct:      [id number]   Room:   331/331-01   Day:  4  Admit Date:  7/8/2025 11:20 AM    PCP:   Mulugeta Bruce MD  Code Status:  Full Code    Subjective:   No new healthcare issues patient is tolerating tube feeding well and is now up to almost full goal rate.  Tomorrow we will switch to bolus feeding in anticipation of home feedings.  Vital signs are stable.  He does not complain of pain he does have some nausea with eating and usually this is from taste and not from early satiety.  Certain foods like pudding and applesauce do not cause any problems.  He can swallow water quite well he does not like the taste of oral Ensure.  Patient is scheduled for liver biopsy Monday patient reports that he does have ghost images after looking at an object at send right sunlight or high contrast.  Does not seem to happen while reading    Medications:     Allergies:    Allergies   Allergen Reactions    Ropinirole Other (See Comments)     Dizziness - syncope       Current Meds:   Scheduled Meds:    metoprolol tartrate  25 mg Oral BID    dexAMETHasone  4 mg IntraVENous Q6H    cyanocobalamin  1,000 mcg IntraMUSCular Daily    sodium chloride flush  5-40 mL IntraVENous 2 times per day    famotidine  20 mg Oral BID    pantoprazole (PROTONIX) 40 mg in sodium chloride (PF) 0.9 % 10 mL injection  40 mg IntraVENous Daily    tamsulosin  0.4 mg Oral Daily    atorvastatin  80 mg Oral Nightly    latanoprost  1 drop Both Eyes Nightly    levothyroxine  200 mcg Oral Daily    magnesium oxide  200 mg Oral Daily    melatonin  3 mg Oral Nightly    therapeutic multivitamin-minerals  1 tablet Oral Daily    primidone  100 mg Oral BID    primidone  50 mg Oral QHS    sodium chloride  80 mL IntraVENous Once     Continuous Infusions:    sodium chloride       PRN Meds:  oxyCODONE, morphine, naloxone, sodium chloride flush, sodium chloride, potassium chloride **OR** potassium alternative oral replacement **OR** potassium chloride, magnesium sulfate, ondansetron **OR** ondansetron, polyethylene glycol, acetaminophen **OR** acetaminophen, sodium chloride flush    Data:     Vitals: Signs are stable.  /73   Pulse 74   Temp 98.6 °F (37 °C) (Oral)   Resp 16   Ht 1.88 m (6' 2.02\")   Wt 83.5 kg (184 lb 1.4 oz)   SpO2 95%   BMI 23.61 kg/m²   Temp (24hrs), Av.3 °F (36.8 °C), Min:97.7 °F (36.5 °C), Max:99 °F (37.2 °C)    No results for input(s): \"POCGLU\" in the last 72 hours.    I/O (24Hr):    Intake/Output Summary (Last 24 hours) at 2025 1019  Last data filed at 2025 0600  Gross per 24 hour   Intake 1391 ml   Output 500 ml   Net 891 ml       Labs:  Hematology:  Recent Labs     07/10/25  0532 25  0651 25  0510   WBC 6.1 8.0 7.7   RBC 4.51 4.48* 4.34*   HGB 13.5 13.1* 12.9*   HCT 39.6* 39.1* 37.3*   MCV 87.9 87.1 86.1   MCH 29.9 29.3 29.8   MCHC 34.1 33.6 34.7   RDW 13.1 13.1 12.9    229 196   MPV 7.8 7.8 7.4   INR 1.3*  --   --      Chemistry:  Recent Labs     07/10/25  0532 25  0651 25  0510    138 135*   K 4.2 4.3 4.2    102 100   CO2 21 23 25   GLUCOSE 109* 116* 164*   BUN 22 23 22   CREATININE 0.9 0.9 0.8   ANIONGAP 14 13 10   LABGLOM 86 86 89   CALCIUM 10.5* 10.6* 10.3     Recent Labs     07/10/25  0532 25  0651 25  0510   AST 54* 54* 54*   ALT 19 18 17   ALKPHOS 152* 134* 140*   BILITOT 0.5 0.5 0.4     ABG:No results found for: \"POCPH\", \"PHART\", \"PH\", \"POCPCO2\", \"LAH7EGW\", \"PCO2\", \"POCPO2\", \"PO2ART\", \"PO2\", \"POCHCO3\", \"NEF3TAU\", \"HCO3\", \"NBEA\", \"PBEA\", \"BEART\", \"BE\", \"THGBART\", \"THB\", \"XLE5DNX\", \"SHOL9WGX\", \"A6KHFFAZ\", \"O2SAT\", \"FIO2\"  No results found for: \"SPECIAL\"  No results found for: \"CULTURE\"    Radiology:  MRI BRAIN W WO CONTRAST  Result Date: 7/10/2025  At least 6 brain metastases with internal

## 2025-07-13 PROCEDURE — 2060000000 HC ICU INTERMEDIATE R&B

## 2025-07-13 PROCEDURE — 2500000003 HC RX 250 WO HCPCS: Performed by: SURGERY

## 2025-07-13 PROCEDURE — 6370000000 HC RX 637 (ALT 250 FOR IP): Performed by: SURGERY

## 2025-07-13 PROCEDURE — 97530 THERAPEUTIC ACTIVITIES: CPT

## 2025-07-13 PROCEDURE — 99232 SBSQ HOSP IP/OBS MODERATE 35: CPT | Performed by: FAMILY MEDICINE

## 2025-07-13 PROCEDURE — 6360000002 HC RX W HCPCS: Performed by: SURGERY

## 2025-07-13 PROCEDURE — 6370000000 HC RX 637 (ALT 250 FOR IP): Performed by: FAMILY MEDICINE

## 2025-07-13 PROCEDURE — 6360000002 HC RX W HCPCS: Performed by: INTERNAL MEDICINE

## 2025-07-13 RX ADMIN — PRIMIDONE 100 MG: 50 TABLET ORAL at 09:03

## 2025-07-13 RX ADMIN — MAGNESIUM GLUCONATE 500 MG ORAL TABLET 200 MG: 500 TABLET ORAL at 09:03

## 2025-07-13 RX ADMIN — SODIUM CHLORIDE, PRESERVATIVE FREE 10 ML: 5 INJECTION INTRAVENOUS at 09:04

## 2025-07-13 RX ADMIN — METOPROLOL TARTRATE 25 MG: 25 TABLET, FILM COATED ORAL at 22:10

## 2025-07-13 RX ADMIN — CYANOCOBALAMIN 1000 MCG: 1000 INJECTION, SOLUTION INTRAMUSCULAR; SUBCUTANEOUS at 09:03

## 2025-07-13 RX ADMIN — DEXAMETHASONE SODIUM PHOSPHATE 4 MG: 4 INJECTION, SOLUTION INTRA-ARTICULAR; INTRALESIONAL; INTRAMUSCULAR; INTRAVENOUS; SOFT TISSUE at 18:01

## 2025-07-13 RX ADMIN — PRIMIDONE 50 MG: 50 TABLET ORAL at 22:11

## 2025-07-13 RX ADMIN — Medication 1 TABLET: at 09:03

## 2025-07-13 RX ADMIN — LEVOTHYROXINE SODIUM 200 MCG: 125 TABLET ORAL at 05:03

## 2025-07-13 RX ADMIN — DEXAMETHASONE SODIUM PHOSPHATE 4 MG: 4 INJECTION, SOLUTION INTRA-ARTICULAR; INTRALESIONAL; INTRAMUSCULAR; INTRAVENOUS; SOFT TISSUE at 00:19

## 2025-07-13 RX ADMIN — ONDANSETRON 4 MG: 2 INJECTION, SOLUTION INTRAMUSCULAR; INTRAVENOUS at 06:05

## 2025-07-13 RX ADMIN — FAMOTIDINE 20 MG: 20 TABLET, FILM COATED ORAL at 09:03

## 2025-07-13 RX ADMIN — DEXAMETHASONE SODIUM PHOSPHATE 4 MG: 4 INJECTION, SOLUTION INTRA-ARTICULAR; INTRALESIONAL; INTRAMUSCULAR; INTRAVENOUS; SOFT TISSUE at 13:57

## 2025-07-13 RX ADMIN — SODIUM CHLORIDE, PRESERVATIVE FREE 10 ML: 5 INJECTION INTRAVENOUS at 00:20

## 2025-07-13 RX ADMIN — ATORVASTATIN CALCIUM 80 MG: 40 TABLET, FILM COATED ORAL at 22:10

## 2025-07-13 RX ADMIN — PRIMIDONE 100 MG: 50 TABLET ORAL at 13:57

## 2025-07-13 RX ADMIN — FAMOTIDINE 20 MG: 20 TABLET, FILM COATED ORAL at 22:10

## 2025-07-13 RX ADMIN — Medication 3 MG: at 22:11

## 2025-07-13 RX ADMIN — DEXAMETHASONE SODIUM PHOSPHATE 4 MG: 4 INJECTION, SOLUTION INTRA-ARTICULAR; INTRALESIONAL; INTRAMUSCULAR; INTRAVENOUS; SOFT TISSUE at 05:05

## 2025-07-13 RX ADMIN — SODIUM CHLORIDE, PRESERVATIVE FREE 40 MG: 5 INJECTION INTRAVENOUS at 09:04

## 2025-07-13 RX ADMIN — SODIUM CHLORIDE, PRESERVATIVE FREE 10 ML: 5 INJECTION INTRAVENOUS at 22:59

## 2025-07-13 RX ADMIN — METOPROLOL TARTRATE 25 MG: 25 TABLET, FILM COATED ORAL at 09:04

## 2025-07-13 RX ADMIN — LATANOPROST 1 DROP: 50 SOLUTION OPHTHALMIC at 22:12

## 2025-07-13 RX ADMIN — TAMSULOSIN HYDROCHLORIDE 0.4 MG: 0.4 CAPSULE ORAL at 09:03

## 2025-07-13 NOTE — PROGRESS NOTES
Patient reports tube feeding making his stomach feel bloated.  Tube feeding turned off for now to take a break.

## 2025-07-13 NOTE — PLAN OF CARE
Problem: Discharge Planning  Goal: Discharge to home or other facility with appropriate resources  Outcome: Progressing   Patient actively participates in ADL's and decision making regarding plan of care  Problem: Safety - Adult  Goal: Free from fall injury  Outcome: Progressing   No falls/injuries this shift, bed in lowest position, brakes on, bed alarm on, call light in reach, side rails up x2  Problem: Pain  Goal: Verbalizes/displays adequate comfort level or baseline comfort level  Outcome: Progressing   Denies pain this shift  Problem: Nutrition Deficit:  Goal: Optimize nutritional status  Outcome: Progressing   Tube feeding running at goal 58ml/hr  Problem: Skin/Tissue Integrity  Goal: Skin integrity remains intact  Description: 1.  Monitor for areas of redness and/or skin breakdown  2.  Assess vascular access sites hourly  3.  Every 4-6 hours minimum:  Change oxygen saturation probe site  4.  Every 4-6 hours:  If on nasal continuous positive airway pressure, respiratory therapy assess nares and determine need for appliance change or resting period  Outcome: Progressing   No new skin breakdown noted, no new signs/symptoms of infection, continue to monitor labwork including WBC, medications administered per physician orders

## 2025-07-13 NOTE — PROGRESS NOTES
Patient up to restroom, reports stomach feeling very bloated.  Tube feeding turned off to give patient a break.  Will resume after bit.      @0605 - Patient called out complaining of severe abdominal pain radiating up into his chest.  Rated pain greater than 10 on a scale of 1-10. Patient reported felt like he was going to vomit, however, he only produced a small amount of clear sputum and large belch, no emesis was noted. Also, Zofran IV administered per physician orders. Cool washcloth applied to his forehead. Within a couple of minutes patient reported feeling much better.

## 2025-07-13 NOTE — PLAN OF CARE
Problem: Discharge Planning  Goal: Discharge to home or other facility with appropriate resources  7/13/2025 0706 by Sommer Shelton RN  Outcome: Progressing  7/13/2025 0002 by Tena Hernandez RN  Outcome: Progressing     Problem: Safety - Adult  Goal: Free from fall injury  7/13/2025 0002 by Tena Hernandez RN  Outcome: Progressing     Problem: ABCDS Injury Assessment  Goal: Absence of physical injury  7/13/2025 0002 by Tena Hernandez RN  Outcome: Progressing     Problem: Gastrointestinal - Adult  Goal: Minimal or absence of nausea and vomiting  7/13/2025 0002 by Tena Hernandez RN  Outcome: Progressing     Problem: Hematologic - Adult  Goal: Maintains hematologic stability  7/13/2025 0002 by Tena Hernandez RN  Outcome: Progressing     Problem: Pain  Goal: Verbalizes/displays adequate comfort level or baseline comfort level  7/13/2025 0002 by Tena Hernandez RN  Outcome: Progressing     Problem: Nutrition Deficit:  Goal: Optimize nutritional status  7/13/2025 0002 by Tena Hernandez RN  Outcome: Progressing     Problem: Skin/Tissue Integrity  Goal: Skin integrity remains intact  Description: 1.  Monitor for areas of redness and/or skin breakdown  2.  Assess vascular access sites hourly  3.  Every 4-6 hours minimum:  Change oxygen saturation probe site  4.  Every 4-6 hours:  If on nasal continuous positive airway pressure, respiratory therapy assess nares and determine need for appliance change or resting period  7/13/2025 0002 by Tena Hernandez RN  Outcome: Progressing

## 2025-07-13 NOTE — PROGRESS NOTES
Occupational Therapy  Facility/Department: [address]  Daily Treatment Note  NAME: Prince Gutierrez  : 1945  MRN: [id number]    Date of Service: 2025    No chief complaint on file.     Discharge Recommendations:  Patient would benefit from continued therapy after discharge  OT Equipment Recommendations  Other: AE/DME recommendations TBD    Patient Diagnosis(es): The encounter diagnosis was Gastric mass.     Assessment   Activity Tolerance: Patient tolerated treatment well  Discharge Recommendations: Patient would benefit from continued therapy after discharge  Other: AE/DME recommendations TBD     Plan  Occupational Therapy Plan  Times Per Week: 5-6/wk  Times Per Day: Once a day  Current Treatment Recommendations: Strengthening;Balance training;ROM;Functional mobility training;Endurance training;Safety education & training;Patient/Caregiver education & training;Equipment evaluation, education, & procurement;Positioning;Self-Care / ADL;Home management training;Coordination training    Subjective  Subjective  Subjective: OT TX . Pt. agreeable to OT tx session and reported feel well this date. Pt. actively engaged and motivated during tx session  Pain: Pt. reported no pain during tx session  Orientation  Overall Orientation Status: Within Functional Limits  Cognition  Overall Cognitive Status: Exceptions  Arousal/Alertness: Appears intact  Following Commands: Follows multistep commands with increased time  Attention Span: Appears intact  Safety Judgement: Decreased awareness of need for safety  Problem Solving: Assistance required to identify errors made;Assistance required to generate solutions  Sequencing: Requires cues for some  Cognition Comment: Verbal cueing for MRADL/walker use/pacing     Balance  Sitting: Impaired (SBA for dynamic seated,)  Standing: Impaired     ADL  Feeding: Setup  Feeding Skilled Clinical Factors: Setup beverage mgt  Grooming: Contact guard assistance;Based on clinical  cues for safety/device management    AM-PAC - ADL  AM-PAC Daily Activity - Inpatient   How much help is needed for putting on and taking off regular lower body clothing?: A Little  How much help is needed for bathing (which includes washing, rinsing, drying)?: A Little  How much help is needed for toileting (which includes using toilet, bedpan, or urinal)?: A Little  How much help is needed for putting on and taking off regular upper body clothing?: A Little  How much help is needed for taking care of personal grooming?: A Little  How much help for eating meals?: None  AM-[facility] Inpatient Daily Activity Raw Score: 19  AM-PAC Inpatient ADL T-Scale Score : 40.22  ADL Inpatient CMS 0-100% Score: 42.8  ADL Inpatient CMS G-Code Modifier : CK    Therapy Time   Individual Concurrent Group Co-treatment   Time In 1510         Time Out 1530         Minutes 20         Timed Code Treatment Minutes: 20 Minutes       EMMANUEL LAWTON, OT

## 2025-07-13 NOTE — PROGRESS NOTES
[facility]  IN-PATIENT SERVICE   [facility] - Location: [place]    Progress Note    7/13/2025    11:53 AM    Name:   Prince Gutierrez  MRN:     [id number]     Acct:      [id number]   Room:   [facility]-01   Day:  5  Admit Date:  7/8/2025 11:20 AM    PCP:   Mulugeta Bruce MD  Code Status:  Full Code    Subjective:   Vital signs are stable.  Patient had upper abdominal cramps and nausea last night and this resolved with Zofran and cutting back tube feeding to 30 mL an hour.  He is tolerating this well.  He is having bowel movements and passing gas.  He is scheduled for liver biopsy tomorrow and transportation is in place.  Patient will be n.p.o. at midnight.  Hemoglobin 12.9 white count is normal.  Platelet count is normal    Medications:     Allergies:    Allergies   Allergen Reactions    Ropinirole Other (See Comments)     Dizziness - syncope       Current Meds:   Scheduled Meds:    metoprolol tartrate  25 mg Oral BID    dexAMETHasone  4 mg IntraVENous Q6H    cyanocobalamin  1,000 mcg IntraMUSCular Daily    sodium chloride flush  5-40 mL IntraVENous 2 times per day    famotidine  20 mg Oral BID    pantoprazole (PROTONIX) 40 mg in sodium chloride (PF) 0.9 % 10 mL injection  40 mg IntraVENous Daily    tamsulosin  0.4 mg Oral Daily    atorvastatin  80 mg Oral Nightly    latanoprost  1 drop Both Eyes Nightly    levothyroxine  200 mcg Oral Daily    magnesium oxide  200 mg Oral Daily    melatonin  3 mg Oral Nightly    therapeutic multivitamin-minerals  1 tablet Oral Daily    primidone  100 mg Oral BID    primidone  50 mg Oral QHS    sodium chloride  80 mL IntraVENous Once     Continuous Infusions:    sodium chloride       PRN Meds: oxyCODONE, morphine, naloxone, sodium chloride flush, sodium chloride, potassium chloride **OR** potassium alternative oral replacement **OR** potassium chloride, magnesium sulfate, ondansetron **OR** ondansetron, polyethylene glycol, acetaminophen **OR**     Last Modified POA    * (Principal) 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 tonight he will return here after biopsy and depending on how well he tolerates tube feedings will plan for discharge on Tuesday    Medical Decision Making: Monik Bruce MD  7/13/2025  11:53 AM     Portion of this note were dictated using Dragon speech recognition software. It has been reviewed for accuracy, but may contain grammatical and clerical errors.

## 2025-07-14 ENCOUNTER — HOSPITAL ENCOUNTER (OUTPATIENT)
Dept: ULTRASOUND IMAGING | Age: 80
Discharge: HOME | End: 2025-07-16
Payer: MEDICARE

## 2025-07-14 VITALS
RESPIRATION RATE: 16 BRPM | SYSTOLIC BLOOD PRESSURE: 122 MMHG | HEART RATE: 64 BPM | DIASTOLIC BLOOD PRESSURE: 75 MMHG | OXYGEN SATURATION: 97 %

## 2025-07-14 LAB
EKG ATRIAL RATE: 73 BPM
EKG P AXIS: 28 DEGREES
EKG P-R INTERVAL: 164 MS
EKG Q-T INTERVAL: 396 MS
EKG QRS DURATION: 144 MS
EKG QTC CALCULATION (BAZETT): 436 MS
EKG R AXIS: 5 DEGREES
EKG T AXIS: 9 DEGREES
EKG VENTRICULAR RATE: 73 BPM

## 2025-07-14 PROCEDURE — 99233 SBSQ HOSP IP/OBS HIGH 50: CPT | Performed by: FAMILY MEDICINE

## 2025-07-14 PROCEDURE — 2500000003 HC RX 250 WO HCPCS: Performed by: SURGERY

## 2025-07-14 PROCEDURE — 88307 TISSUE EXAM BY PATHOLOGIST: CPT

## 2025-07-14 PROCEDURE — 6360000002 HC RX W HCPCS: Performed by: SURGERY

## 2025-07-14 PROCEDURE — 88333 PATH CONSLTJ SURG CYTO XM 1: CPT

## 2025-07-14 PROCEDURE — 88341 IMHCHEM/IMCYTCHM EA ADD ANTB: CPT

## 2025-07-14 PROCEDURE — 88342 IMHCHEM/IMCYTCHM 1ST ANTB: CPT

## 2025-07-14 PROCEDURE — 2709999900 US BIOPSY LIVER PERCUTANEOUS

## 2025-07-14 PROCEDURE — 93010 ELECTROCARDIOGRAM REPORT: CPT | Performed by: INTERNAL MEDICINE

## 2025-07-14 PROCEDURE — 6370000000 HC RX 637 (ALT 250 FOR IP): Performed by: SURGERY

## 2025-07-14 PROCEDURE — 2580000003 HC RX 258: Performed by: FAMILY MEDICINE

## 2025-07-14 PROCEDURE — 6370000000 HC RX 637 (ALT 250 FOR IP): Performed by: FAMILY MEDICINE

## 2025-07-14 PROCEDURE — 6360000002 HC RX W HCPCS: Performed by: INTERNAL MEDICINE

## 2025-07-14 PROCEDURE — 2060000000 HC ICU INTERMEDIATE R&B

## 2025-07-14 PROCEDURE — 88334 PATH CONSLTJ SURG CYTO XM EA: CPT

## 2025-07-14 PROCEDURE — 0FB13ZX EXCISION OF RIGHT LOBE LIVER, PERCUTANEOUS APPROACH, DIAGNOSTIC: ICD-10-PCS | Performed by: RADIOLOGY

## 2025-07-14 RX ORDER — SODIUM CHLORIDE 9 MG/ML
INJECTION, SOLUTION INTRAVENOUS CONTINUOUS
Status: DISCONTINUED | OUTPATIENT
Start: 2025-07-14 | End: 2025-07-17 | Stop reason: HOSPADM

## 2025-07-14 RX ADMIN — SODIUM CHLORIDE, PRESERVATIVE FREE 10 ML: 5 INJECTION INTRAVENOUS at 08:40

## 2025-07-14 RX ADMIN — ATORVASTATIN CALCIUM 80 MG: 40 TABLET, FILM COATED ORAL at 20:11

## 2025-07-14 RX ADMIN — SODIUM CHLORIDE, PRESERVATIVE FREE 40 MG: 5 INJECTION INTRAVENOUS at 08:30

## 2025-07-14 RX ADMIN — SODIUM CHLORIDE: 0.9 INJECTION, SOLUTION INTRAVENOUS at 13:23

## 2025-07-14 RX ADMIN — FAMOTIDINE 20 MG: 20 TABLET, FILM COATED ORAL at 20:11

## 2025-07-14 RX ADMIN — METOPROLOL TARTRATE 25 MG: 25 TABLET, FILM COATED ORAL at 20:11

## 2025-07-14 RX ADMIN — DEXAMETHASONE SODIUM PHOSPHATE 4 MG: 4 INJECTION, SOLUTION INTRA-ARTICULAR; INTRALESIONAL; INTRAMUSCULAR; INTRAVENOUS; SOFT TISSUE at 20:11

## 2025-07-14 RX ADMIN — ONDANSETRON 4 MG: 2 INJECTION, SOLUTION INTRAMUSCULAR; INTRAVENOUS at 11:51

## 2025-07-14 RX ADMIN — DEXAMETHASONE SODIUM PHOSPHATE 4 MG: 4 INJECTION, SOLUTION INTRA-ARTICULAR; INTRALESIONAL; INTRAMUSCULAR; INTRAVENOUS; SOFT TISSUE at 06:37

## 2025-07-14 RX ADMIN — Medication 3 MG: at 20:11

## 2025-07-14 RX ADMIN — DEXAMETHASONE SODIUM PHOSPHATE 4 MG: 4 INJECTION, SOLUTION INTRA-ARTICULAR; INTRALESIONAL; INTRAMUSCULAR; INTRAVENOUS; SOFT TISSUE at 01:50

## 2025-07-14 RX ADMIN — CYANOCOBALAMIN 1000 MCG: 1000 INJECTION, SOLUTION INTRAMUSCULAR; SUBCUTANEOUS at 08:34

## 2025-07-14 RX ADMIN — PRIMIDONE 100 MG: 50 TABLET ORAL at 14:47

## 2025-07-14 RX ADMIN — DEXAMETHASONE SODIUM PHOSPHATE 4 MG: 4 INJECTION, SOLUTION INTRA-ARTICULAR; INTRALESIONAL; INTRAMUSCULAR; INTRAVENOUS; SOFT TISSUE at 11:51

## 2025-07-14 RX ADMIN — PRIMIDONE 50 MG: 50 TABLET ORAL at 20:11

## 2025-07-14 RX ADMIN — MORPHINE SULFATE 2 MG: 2 INJECTION, SOLUTION INTRAMUSCULAR; INTRAVENOUS at 13:16

## 2025-07-14 RX ADMIN — LATANOPROST 1 DROP: 50 SOLUTION OPHTHALMIC at 20:18

## 2025-07-14 ASSESSMENT — PAIN DESCRIPTION - ORIENTATION: ORIENTATION: LEFT

## 2025-07-14 ASSESSMENT — PAIN SCALES - GENERAL
PAINLEVEL_OUTOF10: 5
PAINLEVEL_OUTOF10: 8

## 2025-07-14 ASSESSMENT — PAIN DESCRIPTION - LOCATION: LOCATION: ABDOMEN

## 2025-07-14 NOTE — BRIEF OP NOTE
Brief Postoperative Note    Prince LORNA Gutierrez  YOB: 1945  [id number]    Pre-operative Diagnosis: Liver mass      Post-operative Diagnosis: Same    Procedure: Liver bx    Medication Given: none    Anesthesia: 1% Lidocaine     Surgeons/Assistants:  MD Abe     Estimated Blood Loss: Minimal    Complications: none    Technically successful bx of liver mass.  3 core samples were obtained and given to the onsite pathologist that confirmed tissue sampling.     Electronically signed by AMY Lorenzo on 7/14/2025 at 10:49 AM

## 2025-07-14 NOTE — PLAN OF CARE
Problem: Discharge Planning  Goal: Discharge to home or other facility with appropriate resources  7/14/2025 1534 by Saira Mtz RN  Outcome: Progressing  Flowsheets (Taken 7/14/2025 0800)  Discharge to home or other facility with appropriate resources:   Identify barriers to discharge with patient and caregiver   Arrange for needed discharge resources and transportation as appropriate   Identify discharge learning needs (meds, wound care, etc)  7/14/2025 1534 by Saira Mtz RN  Outcome: Progressing  Flowsheets (Taken 7/14/2025 0800)  Discharge to home or other facility with appropriate resources:   Identify barriers to discharge with patient and caregiver   Arrange for needed discharge resources and transportation as appropriate   Identify discharge learning needs (meds, wound care, etc)  7/14/2025 0137 by Tena Hernandez RN  Outcome: Progressing     Problem: Safety - Adult  Goal: Free from fall injury  7/14/2025 1534 by Saira Mtz RN  Outcome: Progressing  7/14/2025 1534 by Saira Mtz RN  Outcome: Progressing  7/14/2025 0137 by Tena Hernandez RN  Outcome: Progressing     Problem: ABCDS Injury Assessment  Goal: Absence of physical injury  7/14/2025 1534 by Saira Mtz RN  Outcome: Progressing  7/14/2025 1534 by Saira Mtz RN  Outcome: Progressing  7/14/2025 0137 by Tena Hernandez RN  Outcome: Progressing     Problem: Gastrointestinal - Adult  Goal: Minimal or absence of nausea and vomiting  7/14/2025 1534 by Saira Mtz RN  Outcome: Progressing  Flowsheets (Taken 7/14/2025 0800)  Minimal or absence of nausea and vomiting:   Administer IV fluids as ordered to ensure adequate hydration   Maintain NPO status until nausea and vomiting are resolved  7/14/2025 1534 by Saira Mtz RN  Outcome: Progressing  Flowsheets (Taken 7/14/2025 0800)  Minimal or absence of nausea and vomiting:   Administer IV fluids as ordered to ensure adequate hydration   Maintain NPO status until

## 2025-07-14 NOTE — CARE COORDINATION
Social work: [facility] did accept referral for hospital bed. They are checking benefits. Family advised they also have a cancer insurance policy if regular insurance does not cover something.  Lucy is working on bed. Sent face to face, order, h&P, demographics.    Marianne from [facility] has what she needs and is scheduling with them on delivery for tomorrow.  Son Gio is agreeable to stay at the house all day for deliveries of tube feedings and hospital bed deliveries if insurance approved both. Cell for son Gio [phone]  Cell for wife [phone]. Both provided to Marianne and Lucy.     Called [facility] and sent updates in [employer] all day today. Keven needs signed. They are ready to assume care. And advised discharge was likely for tomorrow.     Gave wife all phone numbers to the offices for [facility], [employer] and [facility].   Samantha martin

## 2025-07-14 NOTE — PLAN OF CARE
Problem: Discharge Planning  Goal: Discharge to home or other facility with appropriate resources  Outcome: Progressing   Patient actively participates in ADL's and decision making regarding plan of care & family also  Problem: Safety - Adult  Goal: Free from fall injury  Outcome: Progressing   No falls/injuries this shift, bed in lowest position, brakes on, bed alarm on, call light in reach, side rails up x2  Problem: ABCDS Injury Assessment  Goal: Absence of physical injury  Outcome: Progressing   No falls/injuries this shift, bed in lowest position, brakes on, bed alarm on, call light in reach, side rails up x2  Problem: Gastrointestinal - Adult  Goal: Minimal or absence of nausea and vomiting  Outcome: Progressing   No nausea/vomiting this shift, tube feeding was slowed to 30ml/hr today & patient tolerating well, NPO @ midnight for IR procedure on 07/14/2025  Problem: Pain  Goal: Verbalizes/displays adequate comfort level or baseline comfort level  Outcome: Progressing   No new signs/symptoms of pain noted, pain rating 0 on scale 0-10, pain controlled with medication/repositioning

## 2025-07-14 NOTE — PROGRESS NOTES
Pt arrives to room for liver biopsy  Dr Fish and AE RDMS to bedside  Site prepped and draped  Access obtained and specimens given to path    Tolerated well  Access removed and site covered with 2x2 and tegaderm  Report to transport   Return to urg

## 2025-07-14 NOTE — PROGRESS NOTES
Physical Therapy        Physical Therapy Cancel Note      DATE: 2025    NAME: Prince Gutierrez  MRN: [id number]   : 1945      Patient not seen this date for Physical Therapy due to:    Testing: at [facility] for liver biopsy      Electronically signed by Edith Vargas PT on 2025 at 9:56 AM

## 2025-07-14 NOTE — PROGRESS NOTES
Patient requires hospital bed due to protein calorie malnutrition, requiring tube feeds with head of bed needing elevated.  Gait instability, weakness with fall risk.  Difficulty getting in/out of bed.  Brain metasteses with gait instability.  Metastatic gastric cancer.

## 2025-07-14 NOTE — PROGRESS NOTES
Nutrition Note    Patient to transition to bolus feedings in preparation for discharge. Spoke w/ physician this AM r/t discharge enteral order. Enteral order as follows: Vital AF 1.2 bolus via syringe 6x/day @ 237 mL each bolus w/ 50 mL flush before and after each bolus, as tolerated. Will continue to follow while inpatient.    Electronically signed by Jojo Tellez RD on 7/14/25 at 10:06 AM EDT    Contact: [phone]

## 2025-07-14 NOTE — PROGRESS NOTES
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 Metastases involving the liver, left adrenal gland, and involving a periaortic lymph node. Area of nodularity in the left lower lobe   periphery which may also represent a metastasis  -CTH shows 1.5 cm heterogeneous cortical and subcortical mass in the right parietal lobe  with associated vasogenic edema consistent with metastatic lesion  -MRI brain shows at least 6 brain metastases with internal microhemorrhage  -Tumor markers not useful  -Dexamethasone started (IV), would continue PO at discharge  -S/p EGD w/ biopsy 7/9/25--> pathology still pending  -S/p IR liver biopsy 7/14/25--> pathology pending  -S/p port placement, PEG tube placement 7/10/25 by general surgery  -Follow up with radiation oncology, Dr. Gómez as outpatient   -Follow up with Dr. Cerda after discharge to discuss pathology and treatment recommendations     Difficulty swallowing, Malnutrition  -Secondary to GE junction stenosis versus gastric outlet obstruction, in setting of above  -General surgery placed PEG tube 7/10  -Nutritional services following, on TF    Deconditioning/debility  -In setting of advanced malignancy and decreased nutrition  -PT/OT following    Anemia  -Mild. Secondary to B12 deficiency and bleeding gastroesophageal mass  -Hgb=12.9(13.1) normocytic  -Given IM B12 replacement  -No other nutritional deficiencies noted. Hemolysis not suspected  -No objection to resuming Eliquis once no further procedures, if cleared by GI/surgery  -Transfuse for hgb<7  -Will follow up on labs as outpatient    Afib  -Eliquis presently on hold. Await

## 2025-07-14 NOTE — CARE COORDINATION
Social work: spoke to dietitian will send her final note with MD last note so that [facility] and [facility] have everything they need for home care. Samantha martin    Social work: forwarded notes from MD and dietitian and order for feedings to both [facility] and [facility] today. Pt at test now. Will update family on everything when they return. Samantha martin

## 2025-07-14 NOTE — PROGRESS NOTES
[facility] Medicine  IN-PATIENT SERVICE   [facility] - Location: [place]    Progress Note    7/14/2025    8:39 AM    Name:   Prince Gutierrez  MRN:     [id number]     Acct:      [id number]   Room:   [facility] Day:  6  Admit Date:  7/8/2025 11:20 AM    PCP:   Mulugeat Bruce MD  Code Status:  Full Code    Subjective:     Patient doing OK.  Stable for liver biopsy this AM.  NPO after midnight.  Tolerated TF at 30 cc/hr.  Developed horrible epigastric pain at continuous TF 60 cc/hr.  Spoke with dietary.  Bolus TF goal is 240 cc 6 times per day with flushes 50 cc H20 before and after.  Patient still taking flomax orally and tolerating thus far.  He is very weak.  Difficulty getting into/out of bed and weak.    Medications:     Allergies:    Allergies   Allergen Reactions   • Ropinirole Other (See Comments)     Dizziness - syncope       Current Meds:   Scheduled Meds:   • metoprolol tartrate  25 mg Oral BID   • dexAMETHasone  4 mg IntraVENous Q6H   • sodium chloride flush  5-40 mL IntraVENous 2 times per day   • famotidine  20 mg Oral BID   • pantoprazole (PROTONIX) 40 mg in sodium chloride (PF) 0.9 % 10 mL injection  40 mg IntraVENous Daily   • tamsulosin  0.4 mg Oral Daily   • atorvastatin  80 mg Oral Nightly   • latanoprost  1 drop Both Eyes Nightly   • levothyroxine  200 mcg Oral Daily   • magnesium oxide  200 mg Oral Daily   • melatonin  3 mg Oral Nightly   • therapeutic multivitamin-minerals  1 tablet Oral Daily   • primidone  100 mg Oral BID   • primidone  50 mg Oral QHS   • sodium chloride  80 mL IntraVENous Once     Continuous Infusions:   • sodium chloride       PRN Meds: oxyCODONE, morphine, naloxone, sodium chloride flush, sodium chloride, potassium chloride **OR** potassium alternative oral replacement **OR** potassium chloride, magnesium sulfate, ondansetron **OR** ondansetron, polyethylene glycol, acetaminophen **OR** acetaminophen, sodium chloride flush    Data:     Vitals:  BP   Temp 97.7 °F (36.5 °C) (Oral)   Resp 14   Ht 1.88 m (6' 2.02\")   Wt 83 kg (182 lb 15.7 oz)   SpO2 95%   BMI 23.48 kg/m²   Temp (24hrs), Av °F (36.7 °C), Min:97.5 °F (36.4 °C), Max:98.6 °F (37 °C)    No results for input(s): \"POCGLU\" in the last 72 hours.    I/O (24Hr):    Intake/Output Summary (Last 24 hours) at 2025 0839  Last data filed at 2025 0122  Gross per 24 hour   Intake 892 ml   Output 350 ml   Net 542 ml       Labs:  Hematology:  Recent Labs     25  0510   WBC 7.7   RBC 4.34*   HGB 12.9*   HCT 37.3*   MCV 86.1   MCH 29.8   MCHC 34.7   RDW 12.9      MPV 7.4     Chemistry:  Recent Labs     25  0510   *   K 4.2      CO2 25   GLUCOSE 164*   BUN 22   CREATININE 0.8   ANIONGAP 10   LABGLOM 89   CALCIUM 10.3     Recent Labs     25  0510   AST 54*   ALT 17   ALKPHOS 140*   BILITOT 0.4     ABG:No results found for: \"POCPH\", \"PHART\", \"PH\", \"POCPCO2\", \"VVU2TCF\", \"PCO2\", \"POCPO2\", \"PO2ART\", \"PO2\", \"POCHCO3\", \"SSA9QOU\", \"HCO3\", \"NBEA\", \"PBEA\", \"BEART\", \"BE\", \"THGBART\", \"THB\", \"BWH9ZSN\", \"ZUQU7QKE\", \"V1BEMQPY\", \"O2SAT\", \"FIO2\"  No results found for: \"SPECIAL\"  No results found for: \"CULTURE\"    Radiology:  MRI BRAIN W WO CONTRAST  Result Date: 7/10/2025  At least 6 brain metastases with internal microhemorrhage.     XR CHEST PORTABLE  Result Date: 7/10/2025  1.  No acute cardiopulmonary findings. 2.   Left-sided chest port catheter with the tip in the superior vena cava. No pneumothorax     CT HEAD W WO CONTRAST  Result Date: 2025  1.5 cm heterogeneous cortical and subcortical mass in the right parietal lobe with associated vasogenic edema consistent with metastatic lesion.       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 Cerda to discuss treatment options/plan and Dr. Rebecca Gómez as well RE: brain mets  Protein calorie malnutrition- patient unable to tolerate continuous TF 60 cc/hr- per dietary goal Tube feed is bolus around 240 cc 6 times daily with 50 cc flushes before and after.  Will start 60 cc bolus feeds with 30 cc flush after when patient returns from liver biopsy to make sure patient is tolerating bolus goal and advance as tolerated.  Pafib- eliquis on hold for liver biopsy- resume when OK with IR  - Goal discharge home tomorrow- If not tolerating flomax oral will consider switch to doxazosin which can be crushed- will plan D/C home with PRN percocet for pain; zofran sublingual for nausea and miralax PRN constipation   Generalized weakness/protein calorie malnutrition/metastatic gastric CA with brain mets- patient requires hospital bed at home due to these conditions; gait instability, fall risk; need for tube feeds with head of bed elevated.  F2F performed 07/14/2025 per medicare requirements    Medical Decision Making: High    Disposition and Communication:

## 2025-07-15 LAB
ALBUMIN SERPL-MCNC: 3.5 G/DL (ref 3.5–5.2)
ALBUMIN/GLOB SERPL: 1.1 {RATIO} (ref 1–2.5)
ALP SERPL-CCNC: 158 U/L (ref 40–129)
ALT SERPL-CCNC: 37 U/L (ref 10–50)
ANION GAP SERPL CALCULATED.3IONS-SCNC: 10 MMOL/L (ref 9–16)
AST SERPL-CCNC: 70 U/L (ref 10–50)
BASOPHILS # BLD: 0 K/UL (ref 0–0.2)
BASOPHILS NFR BLD: 0 % (ref 0–2)
BILIRUB SERPL-MCNC: 0.5 MG/DL (ref 0–1.2)
BUN SERPL-MCNC: 23 MG/DL (ref 8–23)
CALCIUM SERPL-MCNC: 10.3 MG/DL (ref 8.6–10.4)
CHLORIDE SERPL-SCNC: 101 MMOL/L (ref 98–107)
CO2 SERPL-SCNC: 24 MMOL/L (ref 20–31)
CREAT SERPL-MCNC: 0.7 MG/DL (ref 0.7–1.2)
EOSINOPHIL # BLD: 0 K/UL (ref 0–0.4)
EOSINOPHILS RELATIVE PERCENT: 0 % (ref 1–4)
ERYTHROCYTE [DISTWIDTH] IN BLOOD BY AUTOMATED COUNT: 12.8 % (ref 12.5–15.4)
GFR, ESTIMATED: >90 ML/MIN/1.73M2
GLUCOSE SERPL-MCNC: 135 MG/DL (ref 74–99)
HCT VFR BLD AUTO: 37.5 % (ref 41–53)
HGB BLD-MCNC: 13 G/DL (ref 13.5–17.5)
LYMPHOCYTES NFR BLD: 0.7 K/UL (ref 1–4.8)
LYMPHOCYTES RELATIVE PERCENT: 10 % (ref 24–44)
MCH RBC QN AUTO: 29.7 PG (ref 26–34)
MCHC RBC AUTO-ENTMCNC: 34.6 G/DL (ref 31–37)
MCV RBC AUTO: 85.9 FL (ref 80–100)
MONOCYTES NFR BLD: 0.5 K/UL (ref 0.1–1.2)
MONOCYTES NFR BLD: 7 % (ref 2–11)
NEUTROPHILS NFR BLD: 83 % (ref 36–66)
NEUTS SEG NFR BLD: 6 K/UL (ref 1.8–7.7)
PLATELET # BLD AUTO: 180 K/UL (ref 140–450)
PMV BLD AUTO: 8 FL (ref 6–12)
POTASSIUM SERPL-SCNC: 4 MMOL/L (ref 3.7–5.3)
PROT SERPL-MCNC: 6.6 G/DL (ref 6.6–8.7)
RBC # BLD AUTO: 4.37 M/UL (ref 4.5–5.9)
SODIUM SERPL-SCNC: 135 MMOL/L (ref 136–145)
WBC OTHER # BLD: 7.2 K/UL (ref 3.5–11)

## 2025-07-15 PROCEDURE — 99233 SBSQ HOSP IP/OBS HIGH 50: CPT | Performed by: FAMILY MEDICINE

## 2025-07-15 PROCEDURE — 2500000003 HC RX 250 WO HCPCS: Performed by: SURGERY

## 2025-07-15 PROCEDURE — 97116 GAIT TRAINING THERAPY: CPT

## 2025-07-15 PROCEDURE — 6370000000 HC RX 637 (ALT 250 FOR IP): Performed by: SURGERY

## 2025-07-15 PROCEDURE — 2060000000 HC ICU INTERMEDIATE R&B

## 2025-07-15 PROCEDURE — 85025 COMPLETE CBC W/AUTO DIFF WBC: CPT

## 2025-07-15 PROCEDURE — 36415 COLL VENOUS BLD VENIPUNCTURE: CPT

## 2025-07-15 PROCEDURE — 6360000002 HC RX W HCPCS: Performed by: INTERNAL MEDICINE

## 2025-07-15 PROCEDURE — 6370000000 HC RX 637 (ALT 250 FOR IP): Performed by: FAMILY MEDICINE

## 2025-07-15 PROCEDURE — 97530 THERAPEUTIC ACTIVITIES: CPT

## 2025-07-15 PROCEDURE — 97535 SELF CARE MNGMENT TRAINING: CPT

## 2025-07-15 PROCEDURE — 80053 COMPREHEN METABOLIC PANEL: CPT

## 2025-07-15 PROCEDURE — 6360000002 HC RX W HCPCS: Performed by: SURGERY

## 2025-07-15 RX ORDER — ONDANSETRON 2 MG/ML
4 INJECTION INTRAMUSCULAR; INTRAVENOUS EVERY 4 HOURS PRN
Status: DISCONTINUED | OUTPATIENT
Start: 2025-07-15 | End: 2025-07-17 | Stop reason: HOSPADM

## 2025-07-15 RX ORDER — SUCRALFATE 1 G/1
1 TABLET ORAL
Status: DISCONTINUED | OUTPATIENT
Start: 2025-07-15 | End: 2025-07-17 | Stop reason: HOSPADM

## 2025-07-15 RX ORDER — ONDANSETRON 4 MG/1
4 TABLET, ORALLY DISINTEGRATING ORAL EVERY 4 HOURS PRN
Status: DISCONTINUED | OUTPATIENT
Start: 2025-07-15 | End: 2025-07-17 | Stop reason: HOSPADM

## 2025-07-15 RX ADMIN — ATORVASTATIN CALCIUM 80 MG: 40 TABLET, FILM COATED ORAL at 20:51

## 2025-07-15 RX ADMIN — FAMOTIDINE 20 MG: 20 TABLET, FILM COATED ORAL at 09:15

## 2025-07-15 RX ADMIN — PRIMIDONE 50 MG: 50 TABLET ORAL at 20:51

## 2025-07-15 RX ADMIN — SUCRALFATE 1 G: 1 TABLET ORAL at 20:51

## 2025-07-15 RX ADMIN — DEXAMETHASONE SODIUM PHOSPHATE 4 MG: 4 INJECTION, SOLUTION INTRA-ARTICULAR; INTRALESIONAL; INTRAMUSCULAR; INTRAVENOUS; SOFT TISSUE at 06:59

## 2025-07-15 RX ADMIN — MAGNESIUM GLUCONATE 500 MG ORAL TABLET 200 MG: 500 TABLET ORAL at 09:14

## 2025-07-15 RX ADMIN — SODIUM CHLORIDE, PRESERVATIVE FREE 40 MG: 5 INJECTION INTRAVENOUS at 09:15

## 2025-07-15 RX ADMIN — Medication 1 TABLET: at 09:15

## 2025-07-15 RX ADMIN — DEXAMETHASONE SODIUM PHOSPHATE 4 MG: 4 INJECTION, SOLUTION INTRA-ARTICULAR; INTRALESIONAL; INTRAMUSCULAR; INTRAVENOUS; SOFT TISSUE at 00:55

## 2025-07-15 RX ADMIN — SUCRALFATE 1 G: 1 TABLET ORAL at 12:57

## 2025-07-15 RX ADMIN — PRIMIDONE 100 MG: 50 TABLET ORAL at 09:15

## 2025-07-15 RX ADMIN — METOPROLOL TARTRATE 25 MG: 25 TABLET, FILM COATED ORAL at 09:15

## 2025-07-15 RX ADMIN — FAMOTIDINE 20 MG: 20 TABLET, FILM COATED ORAL at 20:51

## 2025-07-15 RX ADMIN — LEVOTHYROXINE SODIUM 200 MCG: 125 TABLET ORAL at 06:56

## 2025-07-15 RX ADMIN — DEXAMETHASONE SODIUM PHOSPHATE 4 MG: 4 INJECTION, SOLUTION INTRA-ARTICULAR; INTRALESIONAL; INTRAMUSCULAR; INTRAVENOUS; SOFT TISSUE at 18:55

## 2025-07-15 RX ADMIN — TAMSULOSIN HYDROCHLORIDE 0.4 MG: 0.4 CAPSULE ORAL at 09:15

## 2025-07-15 RX ADMIN — ONDANSETRON 4 MG: 2 INJECTION, SOLUTION INTRAMUSCULAR; INTRAVENOUS at 14:49

## 2025-07-15 RX ADMIN — LATANOPROST 1 DROP: 50 SOLUTION OPHTHALMIC at 20:51

## 2025-07-15 RX ADMIN — DEXAMETHASONE SODIUM PHOSPHATE 4 MG: 4 INJECTION, SOLUTION INTRA-ARTICULAR; INTRALESIONAL; INTRAMUSCULAR; INTRAVENOUS; SOFT TISSUE at 12:59

## 2025-07-15 RX ADMIN — Medication 3 MG: at 20:51

## 2025-07-15 RX ADMIN — SODIUM CHLORIDE, PRESERVATIVE FREE 10 ML: 5 INJECTION INTRAVENOUS at 09:15

## 2025-07-15 NOTE — PROGRESS NOTES
[facility] Medicine  IN-PATIENT SERVICE   [facility] - Location: [place]    Progress Note    7/15/2025    11:38 AM    Name:   Prince Gutierrez  MRN:     [id number]     Acct:      [id number]   Room:   [facility]-01   Day:  7  Admit Date:  7/8/2025 11:20 AM    PCP:   Mulugeta Bruce MD  Code Status:  Full Code    Subjective:     Patient having difficulty tolerating tube feeds.  Even at 100 cc bolus feeds he is getting abdominal discomfort/pain.  Dietary to review his tube feeds and order concentrated formula 2.0 to hopefully reduce volume.  Pathology is still pending- I did call pathology and they are to have finalized report after some stainings tomorrow.  S/P liver biopsy yesterday.    Medications:     Allergies:    Allergies   Allergen Reactions    Ropinirole Other (See Comments)     Dizziness - syncope       Current Meds:   Scheduled Meds:    sucralfate  1 g PEG Tube 4x Daily AC & HS    metoprolol tartrate  25 mg Oral BID    dexAMETHasone  4 mg IntraVENous Q6H    sodium chloride flush  5-40 mL IntraVENous 2 times per day    famotidine  20 mg Oral BID    pantoprazole (PROTONIX) 40 mg in sodium chloride (PF) 0.9 % 10 mL injection  40 mg IntraVENous Daily    tamsulosin  0.4 mg Oral Daily    atorvastatin  80 mg Oral Nightly    latanoprost  1 drop Both Eyes Nightly    levothyroxine  200 mcg Oral Daily    magnesium oxide  200 mg Oral Daily    melatonin  3 mg Oral Nightly    therapeutic multivitamin-minerals  1 tablet Oral Daily    primidone  100 mg Oral BID    primidone  50 mg Oral QHS    sodium chloride  80 mL IntraVENous Once     Continuous Infusions:    sodium chloride 75 mL/hr at 07/15/25 1030    sodium chloride       PRN Meds: oxyCODONE, morphine, naloxone, sodium chloride flush, sodium chloride, potassium chloride **OR** potassium alternative oral replacement **OR** potassium chloride, magnesium sulfate, ondansetron **OR** ondansetron, polyethylene glycol, acetaminophen **OR** acetaminophen,  acetaminophen, sodium chloride flush    Data:     Vitals:  /76   Pulse 66   Temp 97.5 °F (36.4 °C) (Oral)   Resp 17   Ht 1.88 m (6' 2.02\")   Wt 83 kg (182 lb 15.7 oz)   SpO2 96%   BMI 23.48 kg/m²   Temp (24hrs), Av.6 °F (36.4 °C), Min:97.5 °F (36.4 °C), Max:97.7 °F (36.5 °C)    No results for input(s): \"POCGLU\" in the last 72 hours.    I/O (24Hr):    Intake/Output Summary (Last 24 hours) at 7/15/2025 1138  Last data filed at 7/15/2025 1030  Gross per 24 hour   Intake 2107.13 ml   Output --   Net 2107.13 ml       Labs:  Hematology:  Recent Labs     07/15/25  0716   WBC 7.2   RBC 4.37*   HGB 13.0*   HCT 37.5*   MCV 85.9   MCH 29.7   MCHC 34.6   RDW 12.8      MPV 8.0     Chemistry:  Recent Labs     07/15/25  0716   *   K 4.0      CO2 24   GLUCOSE 135*   BUN 23   CREATININE 0.7   ANIONGAP 10   LABGLOM >90   CALCIUM 10.3     Recent Labs     07/15/25  0716   AST 70*   ALT 37   ALKPHOS 158*   BILITOT 0.5     ABG:No results found for: \"POCPH\", \"PHART\", \"PH\", \"POCPCO2\", \"LAI5WXK\", \"PCO2\", \"POCPO2\", \"PO2ART\", \"PO2\", \"POCHCO3\", \"AVH7TAY\", \"HCO3\", \"NBEA\", \"PBEA\", \"BEART\", \"BE\", \"THGBART\", \"THB\", \"TEF4CAB\", \"LSCM3NQV\", \"O7AWTEKH\", \"O2SAT\", \"FIO2\"  No results found for: \"SPECIAL\"  No results found for: \"CULTURE\"    Radiology:  US BIOPSY LIVER PERCUTANEOUS  Result Date: 2025  Successful ultrasound-guided core biopsy of a liver mass.     MRI BRAIN W WO CONTRAST  Result Date: 7/10/2025  At least 6 brain metastases with internal microhemorrhage.     XR CHEST PORTABLE  Result Date: 7/10/2025  1.  No acute cardiopulmonary findings. 2.   Left-sided chest port catheter with the tip in the superior vena cava. No pneumothorax     CT HEAD W WO CONTRAST  Result Date: 2025  1.5 cm heterogeneous cortical and subcortical mass in the right parietal lobe with associated vasogenic edema consistent with metastatic lesion.       Physical Examination:     General appearance:  alert, cooperative and no

## 2025-07-15 NOTE — CARE COORDINATION
DENISE spoke with Marianne at [facility], notified patient will not be discharged today and will have new tube feed orders. CM will call back with updates.

## 2025-07-15 NOTE — CARE COORDINATION
[facility] Quality Flow/Interdisciplinary Rounds Progress Note    Quality Flow Rounds held on July 15, 2025 at 0930    Disciplines Attending:  Bedside Nurse, , and Nursing Unit Leadership    Barriers to Discharge: clinical status     Anticipated Discharge Date:   7/15/25    Anticipated Discharge Disposition: home with C     [facility] RISK OF UNPLANNED READMISSION 2.0             12.7 Total Score        Discussed patient goal for the day, patient clinical progression, and barriers to discharge.  [employer] and option to deliver supplies today. [facility] will see patient for [facility]. Plan is for discharge today with family.       Patricio Felix  July 15, 2025

## 2025-07-15 NOTE — PROGRESS NOTES
Comprehensive Nutrition Assessment    Type and Reason for Visit:  Reassess    Nutrition Recommendations/Plan:   Alter enteral regimen: TwoCal  mL bolus 6x/day  Fluid flush 100 mL before and after each bolus   Monitor for need to modify TF, tolerance      Nutrition Assessment:    Spoke w/ Dr Thompson this date who stated patient is not tolerating Vital AF bolus. Lower volume feedings attempted; unsuccessful - will alter enteral orders for a higher kcal, lower volume formula in attempt to mitigate abdominal discomfort/potential intolerance. Recommend to initiate: TwoCal HN bolus regimen of 160 mL 6x/day with free water flush of 100 mL before and after each bolus, as tolerated.  Esimated needs based on 25 kcal/kg r/t BMI 23.5. Patient on PO diet w/ ONS BID ordered to optimize nutrient provisions. Patient will not discharge today - possibly tomorrow pending tolerance to bolus regimen. May need to alter to cyclic if not tolerated. RD to continue to follow PRN and per protocol.    Nutrition Related Findings:    Meds and labs reviewed. Wound Type: None       Current Nutrition Intake & Therapies:    Average Meal Intake: 0%     Anthropometric Measures:  Height: 188 cm (6' 2.02\")  Ideal Body Weight (IBW): 190 lbs (86 kg)       Current Body Weight: 83.5 kg (184 lb 1.4 oz), 96.9 % IBW. Weight Source: Not specified  Current BMI (kg/m2): 23.6     Weight Adjustment For: No Adjustment     BMI Categories: Normal Weight (BMI 22.0 to 24.9) age over 65    Estimated Daily Nutrient Needs:  Energy Requirements Based On: Kcal/kg  Weight Used for Energy Requirements: Current  Energy (kcal/day): 5452-0658  Weight Used for Protein Requirements: Current  Protein (g/day): 104-114  Method Used for Fluid Requirements: 1 ml/kcal  Fluid (ml/day): 1700    Nutrition Diagnosis:   Increased nutrient needs related to catabolic illness as evidenced by NPO or clear liquid status due to medical condition, weight loss, variable po intake    Nutrition

## 2025-07-15 NOTE — PLAN OF CARE
Problem: Discharge Planning  Goal: Discharge to home or other facility with appropriate resources  7/15/2025 0024 by Paige Lay RN  Outcome: Progressing  Flowsheets (Taken 7/14/2025 1600 by Saira Mtz RN)  Discharge to home or other facility with appropriate resources:   Identify barriers to discharge with patient and caregiver   Identify discharge learning needs (meds, wound care, etc)   Arrange for needed discharge resources and transportation as appropriate  7/14/2025 1534 by Saira Mtz RN  Outcome: Progressing  Flowsheets (Taken 7/14/2025 0800)  Discharge to home or other facility with appropriate resources:   Identify barriers to discharge with patient and caregiver   Arrange for needed discharge resources and transportation as appropriate   Identify discharge learning needs (meds, wound care, etc)     Problem: Safety - Adult  Goal: Free from fall injury  7/15/2025 0024 by Paige Lay RN  Outcome: Progressing  7/14/2025 1534 by Saira Mtz RN  Outcome: Progressing     Problem: ABCDS Injury Assessment  Goal: Absence of physical injury  7/15/2025 0024 by Paige Lay RN  Outcome: Progressing  7/14/2025 1534 by Saira Mtz RN  Outcome: Progressing     Problem: Gastrointestinal - Adult  Goal: Minimal or absence of nausea and vomiting  7/15/2025 0024 by Paige Lay RN  Outcome: Progressing  Flowsheets (Taken 7/14/2025 1600 by Saira Mtz RN)  Minimal or absence of nausea and vomiting: Administer IV fluids as ordered to ensure adequate hydration  7/14/2025 1534 by Saira Mtz RN  Outcome: Progressing  Flowsheets (Taken 7/14/2025 0800)  Minimal or absence of nausea and vomiting:   Administer IV fluids as ordered to ensure adequate hydration   Maintain NPO status until nausea and vomiting are resolved     Problem: Hematologic - Adult  Goal: Maintains hematologic stability  7/15/2025 0024 by Paige Lay RN  Outcome: Progressing  Flowsheets (Taken 7/14/2025 1600 by Saira Mtz RN)  Maintains

## 2025-07-15 NOTE — PROGRESS NOTES
Occupational Therapy  Occupational Therapy Daily Treatment Note  Facility/Department: [address]   Patient Name: Prince Gutierrez        MRN: [id number]    : 1945    Date of Service: 7/15/2025    No chief complaint on file.    Past Medical History:  has a past medical history of Arthritis, Atrial fibrillation (HCC), BPH (benign prostatic hyperplasia), CAD (coronary artery disease), Essential tremor, Glaucoma, Hyperlipidemia, Hypertension, Hypothyroid, Scoliosis, TIA (transient ischemic attack), and Under care of team.  Past Surgical History:  has a past surgical history that includes Coronary artery bypass graft (2022); joint replacement (Left); Tonsillectomy; Coronary angioplasty with stent (2022); Cholecystectomy; Colonoscopy (2015); Total hip arthroplasty (Right, 10/25/2023); Total hip arthroplasty (Right, 10/25/2023); Cardioversion (2022); Eye surgery (Right, 2024); Cataract extraction (Right, 2024); Upper gastrointestinal endoscopy (N/A, 2025); Upper gastrointestinal endoscopy (N/A, 7/10/2025); Port Surgery (N/A, 7/10/2025); and US BIOPSY LIVER PERCUTANEOUS (2025).    Discharge Recommendations  Discharge Recommendations: Home with assist PRN  OT Equipment Recommendations  Other: AE/DME recommendations TBD    Assessment  Performance deficits / Impairments: Decreased functional mobility ;Decreased ADL status;Decreased ROM;Decreased strength;Decreased safe awareness;Decreased endurance;Decreased balance;Decreased fine motor control;Decreased high-level IADLs  Assessment: Pt presents to OT this date with above noted deficits. Pt is not currently functioning at [facility] but is making progress towards OT goals. At this time Pt does not demonstrate the functional ability to safely return to prior living arrangements. It is recommended that Pt recieve OT services during hospitalization to increase safety/ADLs for safe return to previous environment.  Prognosis: Good  Decision Making:

## 2025-07-15 NOTE — PLAN OF CARE
Problem: Discharge Planning  Goal: Discharge to home or other facility with appropriate resources  7/15/2025 1146 by Beba Rios RN  Outcome: Progressing     Problem: Safety - Adult  Goal: Free from fall injury  7/15/2025 1146 by Beba Rios RN  Outcome: Progressing     Problem: ABCDS Injury Assessment  Goal: Absence of physical injury  7/15/2025 1146 by Beba Rios RN  Outcome: Progressing     Problem: Gastrointestinal - Adult  Goal: Minimal or absence of nausea and vomiting  7/15/2025 1146 by Beba Rios RN  Outcome: Progressing     Problem: Hematologic - Adult  Goal: Maintains hematologic stability  7/15/2025 1146 by Beba Rios RN  Outcome: Progressing     Problem: Pain  Goal: Verbalizes/displays adequate comfort level or baseline comfort level  7/15/2025 1146 by Beba Rios RN  Outcome: Progressing     Problem: Nutrition Deficit:  Goal: Optimize nutritional status  7/15/2025 1146 by Beba Rios RN  Outcome: Progressing     Problem: Skin/Tissue Integrity  Goal: Skin integrity remains intact  Description: 1.  Monitor for areas of redness and/or skin breakdown  2.  Assess vascular access sites hourly  3.  Every 4-6 hours minimum:  Change oxygen saturation probe site  4.  Every 4-6 hours:  If on nasal continuous positive airway pressure, respiratory therapy assess nares and determine need for appliance change or resting period  7/15/2025 1146 by Beba Rios RN  Outcome: Progressing     Problem: Neurosensory - Adult  Goal: Achieves stable or improved neurological status  7/15/2025 1146 by Beba Rios RN  Outcome: Progressing     Problem: Respiratory - Adult  Goal: Achieves optimal ventilation and oxygenation  7/15/2025 1146 by Beba Rios RN  Outcome: Progressing     Problem: Skin/Tissue Integrity - Adult  Goal: Skin integrity remains intact  Description: 1.  Monitor for areas of redness and/or skin breakdown  2.  Assess vascular access sites hourly  3.  Every 4-6 hours

## 2025-07-15 NOTE — PROGRESS NOTES
Physical Therapy  Facility/Department: [address]   Physical Therapy Daily Treatment Note    Patient Name: Prince Gutierrez        MRN: [id number]    : 1945    Date of Service: 7/15/2025    Past Medical History:  has a past medical history of Arthritis, Atrial fibrillation (HCC), BPH (benign prostatic hyperplasia), CAD (coronary artery disease), Essential tremor, Glaucoma, Hyperlipidemia, Hypertension, Hypothyroid, Scoliosis, TIA (transient ischemic attack), and Under care of team.  Past Surgical History:  has a past surgical history that includes Coronary artery bypass graft (2022); joint replacement (Left); Tonsillectomy; Coronary angioplasty with stent (2022); Cholecystectomy; Colonoscopy (2015); Total hip arthroplasty (Right, 10/25/2023); Total hip arthroplasty (Right, 10/25/2023); Cardioversion (2022); Eye surgery (Right, 2024); Cataract extraction (Right, 2024); Upper gastrointestinal endoscopy (N/A, 2025); Upper gastrointestinal endoscopy (N/A, 7/10/2025); Port Surgery (N/A, 7/10/2025); and US BIOPSY LIVER PERCUTANEOUS (2025).    Discharge Recommendations  Discharge Recommendations: Patient would benefit from continued therapy after discharge  PT Equipment Recommendations  Equipment Needed: No  Other: has RW that writter recommends the patient use after discharge    Assessment  Body Structures, Functions, Activity Limitations Requiring Skilled Therapeutic Intervention: Decreased functional mobility ;Decreased balance;Decreased safe awareness;Decreased endurance  Assessment: The patient was admitted due to SOB and upper abdominal pain. At baseline, the patient lives wife and requires no assistance with ADLs and functional mobility with gait no device, but in last few weeks began using RW per daughter's (who is a PT) request as pt decreased appetite and loss of weight. The patient resides in 2 story home w/ basement with bedroom on 2nd floor but pt sleeping in recliner  modified indep 300ft with RW.  Short Term Goal 4: Pt will go up/down 1 step with RW modified indep.  Short Term Goal 5: Pt will go up/down 2 steps w/ 2 rails.  Additional Goals?: Yes  Short Term Goal 6: Pt will go up/down flight w/ rail mod indep.    Minutes  PT Individual Minutes  Time In: 1313  Time Out: 1339  Minutes: 26  Time Code Minutes  Timed Code Treatment Minutes: 26 Minutes    Electronically signed by Edith Vargas, PT on 7/15/25 at 2:31 PM EDT

## 2025-07-15 NOTE — PROGRESS NOTES
Writer messaged Dr. Belcher to let him know that the patient reported having an episode of vomiting and describes the appearance as the same color and consistency as the TF he was given this morning at 1048. Writer informed Dr. Belcher that the patient did not receive his last TF bolus d/t complaints of a stomach ache and does not want the next scheduled bolus. Zofran was effective briefly and patient states that his stomach ache symptoms have returned. Writer let Dr. Belcher know that sucralfate was ready to be given but didn't want to give and irritate his stomach more.       1822: Dr. Belcher called the writer and stated that he will make adjustments to his medications and fluids and TF. Orders sent.

## 2025-07-15 NOTE — PROGRESS NOTES
Writer messaged Dr. Thompson regarding the patient still c/o having a stomach ache. Writer let Dr. Thompson know that in attempt to give the modified diet of 2.0 Bolus, th e patient asked that we hold off and says that the stomach came on abruptly and he doesn't want his afternoon dose of the mysoline d/t his symptoms. Patient c/o mild stomach ache and denies nausea and pain.     Dr Thompson replied to try to give the Zofran to see if it helps and skip the 1430 feeding and try again at the next scheduled bolus. Dr. Thompson stated that if more that 100 cc is recommended, then only try to give 1/2 of volume bolus.       1545: Writer let Dr. Thompson know that the Zofran was effective for the patient.

## 2025-07-16 PROBLEM — C16.9 ADENOCARCINOMA OF STOMACH (HCC): Status: ACTIVE | Noted: 2025-07-16

## 2025-07-16 PROBLEM — R13.12 OROPHARYNGEAL DYSPHAGIA: Status: ACTIVE | Noted: 2025-07-16

## 2025-07-16 LAB
ALBUMIN SERPL-MCNC: 3.5 G/DL (ref 3.5–5.2)
ALBUMIN/GLOB SERPL: 1.2 {RATIO} (ref 1–2.5)
ALP SERPL-CCNC: 160 U/L (ref 40–129)
ALT SERPL-CCNC: 32 U/L (ref 10–50)
ANION GAP SERPL CALCULATED.3IONS-SCNC: 12 MMOL/L (ref 9–16)
AST SERPL-CCNC: 65 U/L (ref 10–50)
BASOPHILS # BLD: 0 K/UL (ref 0–0.2)
BASOPHILS NFR BLD: 0 % (ref 0–2)
BILIRUB SERPL-MCNC: 0.5 MG/DL (ref 0–1.2)
BUN SERPL-MCNC: 21 MG/DL (ref 8–23)
CALCIUM SERPL-MCNC: 10.3 MG/DL (ref 8.6–10.4)
CHLORIDE SERPL-SCNC: 100 MMOL/L (ref 98–107)
CO2 SERPL-SCNC: 23 MMOL/L (ref 20–31)
CREAT SERPL-MCNC: 0.7 MG/DL (ref 0.7–1.2)
EOSINOPHIL # BLD: 0 K/UL (ref 0–0.4)
EOSINOPHILS RELATIVE PERCENT: 0 % (ref 1–4)
ERYTHROCYTE [DISTWIDTH] IN BLOOD BY AUTOMATED COUNT: 13.1 % (ref 12.5–15.4)
GFR, ESTIMATED: >90 ML/MIN/1.73M2
GLUCOSE SERPL-MCNC: 117 MG/DL (ref 74–99)
HCT VFR BLD AUTO: 37.5 % (ref 41–53)
HGB BLD-MCNC: 13 G/DL (ref 13.5–17.5)
LYMPHOCYTES NFR BLD: 0.8 K/UL (ref 1–4.8)
LYMPHOCYTES RELATIVE PERCENT: 10 % (ref 24–44)
MCH RBC QN AUTO: 29.8 PG (ref 26–34)
MCHC RBC AUTO-ENTMCNC: 34.6 G/DL (ref 31–37)
MCV RBC AUTO: 86.3 FL (ref 80–100)
MONOCYTES NFR BLD: 0.6 K/UL (ref 0.1–1.2)
MONOCYTES NFR BLD: 7 % (ref 2–11)
NEUTROPHILS NFR BLD: 83 % (ref 36–66)
NEUTS SEG NFR BLD: 6.5 K/UL (ref 1.8–7.7)
PLATELET # BLD AUTO: 157 K/UL (ref 140–450)
PMV BLD AUTO: 7.8 FL (ref 6–12)
POTASSIUM SERPL-SCNC: 4.4 MMOL/L (ref 3.7–5.3)
PROT SERPL-MCNC: 6.5 G/DL (ref 6.6–8.7)
RBC # BLD AUTO: 4.34 M/UL (ref 4.5–5.9)
SODIUM SERPL-SCNC: 135 MMOL/L (ref 136–145)
SURGICAL PATHOLOGY REPORT: NORMAL
WBC OTHER # BLD: 8 K/UL (ref 3.5–11)

## 2025-07-16 PROCEDURE — 36415 COLL VENOUS BLD VENIPUNCTURE: CPT

## 2025-07-16 PROCEDURE — 97535 SELF CARE MNGMENT TRAINING: CPT

## 2025-07-16 PROCEDURE — 80053 COMPREHEN METABOLIC PANEL: CPT

## 2025-07-16 PROCEDURE — 6370000000 HC RX 637 (ALT 250 FOR IP): Performed by: SURGERY

## 2025-07-16 PROCEDURE — 97530 THERAPEUTIC ACTIVITIES: CPT

## 2025-07-16 PROCEDURE — 2580000003 HC RX 258: Performed by: FAMILY MEDICINE

## 2025-07-16 PROCEDURE — 99233 SBSQ HOSP IP/OBS HIGH 50: CPT | Performed by: FAMILY MEDICINE

## 2025-07-16 PROCEDURE — 6370000000 HC RX 637 (ALT 250 FOR IP): Performed by: FAMILY MEDICINE

## 2025-07-16 PROCEDURE — 85025 COMPLETE CBC W/AUTO DIFF WBC: CPT

## 2025-07-16 PROCEDURE — 2500000003 HC RX 250 WO HCPCS: Performed by: SURGERY

## 2025-07-16 PROCEDURE — 6360000002 HC RX W HCPCS: Performed by: SURGERY

## 2025-07-16 PROCEDURE — 2060000000 HC ICU INTERMEDIATE R&B

## 2025-07-16 PROCEDURE — 6360000002 HC RX W HCPCS: Performed by: FAMILY MEDICINE

## 2025-07-16 PROCEDURE — 6360000002 HC RX W HCPCS: Performed by: INTERNAL MEDICINE

## 2025-07-16 RX ADMIN — MAGNESIUM GLUCONATE 500 MG ORAL TABLET 200 MG: 500 TABLET ORAL at 07:55

## 2025-07-16 RX ADMIN — SUCRALFATE 1 G: 1 TABLET ORAL at 12:02

## 2025-07-16 RX ADMIN — ONDANSETRON 4 MG: 2 INJECTION, SOLUTION INTRAMUSCULAR; INTRAVENOUS at 08:09

## 2025-07-16 RX ADMIN — FAMOTIDINE 20 MG: 20 TABLET, FILM COATED ORAL at 07:54

## 2025-07-16 RX ADMIN — Medication 3 MG: at 20:49

## 2025-07-16 RX ADMIN — METOPROLOL TARTRATE 25 MG: 25 TABLET, FILM COATED ORAL at 20:49

## 2025-07-16 RX ADMIN — LATANOPROST 1 DROP: 50 SOLUTION OPHTHALMIC at 20:50

## 2025-07-16 RX ADMIN — ONDANSETRON 4 MG: 4 TABLET, ORALLY DISINTEGRATING ORAL at 20:49

## 2025-07-16 RX ADMIN — PRIMIDONE 100 MG: 50 TABLET ORAL at 07:53

## 2025-07-16 RX ADMIN — ATORVASTATIN CALCIUM 80 MG: 40 TABLET, FILM COATED ORAL at 20:49

## 2025-07-16 RX ADMIN — DEXAMETHASONE SODIUM PHOSPHATE 4 MG: 4 INJECTION, SOLUTION INTRA-ARTICULAR; INTRALESIONAL; INTRAMUSCULAR; INTRAVENOUS; SOFT TISSUE at 07:55

## 2025-07-16 RX ADMIN — SODIUM CHLORIDE: 0.9 INJECTION, SOLUTION INTRAVENOUS at 20:58

## 2025-07-16 RX ADMIN — SODIUM CHLORIDE, PRESERVATIVE FREE 40 MG: 5 INJECTION INTRAVENOUS at 07:55

## 2025-07-16 RX ADMIN — SUCRALFATE 1 G: 1 TABLET ORAL at 20:49

## 2025-07-16 RX ADMIN — SUCRALFATE 1 G: 1 TABLET ORAL at 07:54

## 2025-07-16 RX ADMIN — SUCRALFATE 1 G: 1 TABLET ORAL at 17:02

## 2025-07-16 RX ADMIN — DEXAMETHASONE SODIUM PHOSPHATE 4 MG: 4 INJECTION, SOLUTION INTRA-ARTICULAR; INTRALESIONAL; INTRAMUSCULAR; INTRAVENOUS; SOFT TISSUE at 01:27

## 2025-07-16 RX ADMIN — SODIUM CHLORIDE: 0.9 INJECTION, SOLUTION INTRAVENOUS at 11:16

## 2025-07-16 RX ADMIN — SODIUM CHLORIDE, PRESERVATIVE FREE 10 ML: 5 INJECTION INTRAVENOUS at 07:56

## 2025-07-16 RX ADMIN — DEXAMETHASONE SODIUM PHOSPHATE 4 MG: 4 INJECTION, SOLUTION INTRA-ARTICULAR; INTRALESIONAL; INTRAMUSCULAR; INTRAVENOUS; SOFT TISSUE at 14:18

## 2025-07-16 RX ADMIN — ONDANSETRON 4 MG: 2 INJECTION, SOLUTION INTRAMUSCULAR; INTRAVENOUS at 17:02

## 2025-07-16 RX ADMIN — TAMSULOSIN HYDROCHLORIDE 0.4 MG: 0.4 CAPSULE ORAL at 07:55

## 2025-07-16 RX ADMIN — PRIMIDONE 100 MG: 50 TABLET ORAL at 15:59

## 2025-07-16 RX ADMIN — Medication 1 TABLET: at 08:15

## 2025-07-16 RX ADMIN — DEXAMETHASONE SODIUM PHOSPHATE 4 MG: 4 INJECTION, SOLUTION INTRA-ARTICULAR; INTRALESIONAL; INTRAMUSCULAR; INTRAVENOUS; SOFT TISSUE at 18:44

## 2025-07-16 RX ADMIN — PRIMIDONE 50 MG: 50 TABLET ORAL at 20:49

## 2025-07-16 RX ADMIN — FAMOTIDINE 20 MG: 20 TABLET, FILM COATED ORAL at 20:49

## 2025-07-16 RX ADMIN — LEVOTHYROXINE SODIUM 200 MCG: 125 TABLET ORAL at 07:55

## 2025-07-16 RX ADMIN — SODIUM CHLORIDE: 0.9 INJECTION, SOLUTION INTRAVENOUS at 01:27

## 2025-07-16 NOTE — PLAN OF CARE
Problem: Discharge Planning  Goal: Discharge to home or other facility with appropriate resources  Outcome: Progressing  Flowsheets (Taken 7/15/2025 1954)  Discharge to home or other facility with appropriate resources:   Identify barriers to discharge with patient and caregiver   Arrange for needed discharge resources and transportation as appropriate   Identify discharge learning needs (meds, wound care, etc)   Refer to discharge planning if patient needs post-hospital services based on physician order or complex needs related to functional status, cognitive ability or social support system     Problem: Safety - Adult  Goal: Free from fall injury  Outcome: Progressing     Problem: ABCDS Injury Assessment  Goal: Absence of physical injury  Outcome: Progressing     Problem: Gastrointestinal - Adult  Goal: Minimal or absence of nausea and vomiting  Outcome: Progressing  Flowsheets (Taken 7/15/2025 1954)  Minimal or absence of nausea and vomiting:   Administer IV fluids as ordered to ensure adequate hydration   Provide nonpharmacologic comfort measures as appropriate     Problem: Hematologic - Adult  Goal: Maintains hematologic stability  Outcome: Progressing  Flowsheets (Taken 7/15/2025 1954)  Maintains hematologic stability:   Assess for signs and symptoms of bleeding or hemorrhage   Monitor labs for bleeding or clotting disorders     Problem: Pain  Goal: Verbalizes/displays adequate comfort level or baseline comfort level  Outcome: Progressing  Flowsheets (Taken 7/15/2025 1920)  Verbalizes/displays adequate comfort level or baseline comfort level:   Encourage patient to monitor pain and request assistance   Assess pain using appropriate pain scale   Administer analgesics based on type and severity of pain and evaluate response   Implement non-pharmacological measures as appropriate and evaluate response   Notify Licensed Independent Practitioner if interventions unsuccessful or patient reports new pain

## 2025-07-16 NOTE — PROGRESS NOTES
Patient and his wife and 2 of their children have had extensive verbal and demonstration education.  Patient and wife able to complete tube feed and water flushes with minimal instruction throughout the day.

## 2025-07-16 NOTE — PLAN OF CARE
Problem: Discharge Planning  Goal: Discharge to home or other facility with appropriate resources  7/16/2025 1802 by Steffany Wright RN  Outcome: Progressing     Problem: Safety - Adult  Goal: Free from fall injury  7/16/2025 1802 by Steffany Wright RN  Outcome: Progressing     Problem: ABCDS Injury Assessment  Goal: Absence of physical injury  7/16/2025 1802 by Steffany Wright RN  Outcome: Progressing     Problem: Gastrointestinal - Adult  Goal: Minimal or absence of nausea and vomiting  7/16/2025 1802 by Steffany Wrgiht RN  Outcome: Progressing     Problem: Hematologic - Adult  Goal: Maintains hematologic stability  7/16/2025 1802 by Steffany Wright RN  Outcome: Progressing     Problem: Pain  Goal: Verbalizes/displays adequate comfort level or baseline comfort level  7/16/2025 1802 by Steffany Wright RN  Outcome: Progressing     Problem: Nutrition Deficit:  Goal: Optimize nutritional status  7/16/2025 1802 by Steffany Wright RN  Outcome: Progressing     Problem: Skin/Tissue Integrity  Goal: Skin integrity remains intact  Description: 1.  Monitor for areas of redness and/or skin breakdown  2.  Assess vascular access sites hourly  3.  Every 4-6 hours minimum:  Change oxygen saturation probe site  4.  Every 4-6 hours:  If on nasal continuous positive airway pressure, respiratory therapy assess nares and determine need for appliance change or resting period  7/16/2025 1802 by Steffany Wright RN  Outcome: Progressing     Problem: Neurosensory - Adult  Goal: Achieves stable or improved neurological status  7/16/2025 1802 by Steffany Wright RN  Outcome: Progressing     Problem: Respiratory - Adult  Goal: Achieves optimal ventilation and oxygenation  7/16/2025 1802 by Steffany Wright RN  Outcome: Progressing     Problem: Skin/Tissue Integrity - Adult  Goal: Skin integrity remains intact  Description: 1.  Monitor for areas of redness and/or skin breakdown  2.  Assess vascular access sites hourly  3.  Every 4-6 hours minimum:  Change oxygen saturation

## 2025-07-16 NOTE — PROGRESS NOTES
[facility] Hematology and Oncology - Daily Progress Note  7/16/2025, 9:31 AM    Admit Date: 7/8/2025  PCP: Mulugeta Bruce MD    Impression/Plan:   Metastatic Malignancy, Clinically  -Favoring metastatic gastroesophageal cancer  -CTA c/a/p 6/23/25 showed Heterogeneous mass involving the gastroesophageal junction and gastric cardia, measuring approximately 6.8 x 4.2 cm. Metastases involving the liver, left adrenal gland, and involving a periaortic lymph node. Area of nodularity in the left lower lobe   periphery which may also represent a metastasis  -CTH shows 1.5 cm heterogeneous cortical and subcortical mass in the right parietal lobe  with associated vasogenic edema consistent with metastatic lesion  -MRI brain shows at least 6 brain metastases with internal microhemorrhage  -Tumor markers not useful  -Dexamethasone started (IV), would continue PO at discharge  -S/p EGD w/ biopsy 7/9/25--> preliminary pathology showing poorly differentiated adenocarcinoma. Additional IHC staining pending  -S/p IR liver biopsy 7/14/25--> pathology pending  -S/p port placement, PEG tube placement 7/10/25 by general surgery  -Follow up with radiation oncology, Dr. Gómez as outpatient   -Follow up with Dr. Cerda after discharge to discuss final pathology and treatment recommendations. He does not need to remain in house to await final path    Difficulty swallowing, Malnutrition  -Secondary to GE junction stenosis versus gastric outlet obstruction, in setting of above  -General surgery placed PEG tube 7/10  -Having some difficulty with tolerating TF goals, which is barrier to discharge at this time. -Consider motility agent  -Nutritional services following  -Rest as per primary team    Deconditioning/debility  -In setting of advanced malignancy and decreased nutrition  -PT/OT following    Anemia  -Mild. Secondary to B12 deficiency and bleeding gastroesophageal mass  -Hgb=13.0(13.0) normocytic  -Given IM B12

## 2025-07-16 NOTE — PROGRESS NOTES
Occupational Therapy  Occupational Therapy Daily Treatment Note  Facility/Department: [address]   Patient Name: Prince Gutierrez        MRN: [id number]    : 1945    Date of Service: 2025    No chief complaint on file.    Past Medical History:  has a past medical history of Arthritis, Atrial fibrillation (HCC), BPH (benign prostatic hyperplasia), CAD (coronary artery disease), Essential tremor, Glaucoma, Hyperlipidemia, Hypertension, Hypothyroid, Scoliosis, TIA (transient ischemic attack), and Under care of team.  Past Surgical History:  has a past surgical history that includes Coronary artery bypass graft (2022); joint replacement (Left); Tonsillectomy; Coronary angioplasty with stent (2022); Cholecystectomy; Colonoscopy (2015); Total hip arthroplasty (Right, 10/25/2023); Total hip arthroplasty (Right, 10/25/2023); Cardioversion (2022); Eye surgery (Right, 2024); Cataract extraction (Right, 2024); Upper gastrointestinal endoscopy (N/A, 2025); Upper gastrointestinal endoscopy (N/A, 7/10/2025); Port Surgery (N/A, 7/10/2025); and US BIOPSY LIVER PERCUTANEOUS (2025).    Discharge Recommendations  Discharge Recommendations: Home with assist PRN  OT Equipment Recommendations  Other: AE/DME recommendations TBD    Assessment  Performance deficits / Impairments: Decreased functional mobility ;Decreased ADL status;Decreased ROM;Decreased strength;Decreased safe awareness;Decreased endurance;Decreased balance;Decreased fine motor control;Decreased high-level IADLs  Assessment: Pt continues to demonstrate above noted deficits. Pt is not currently functioning at [facility] but is making progress towards OT goals. At this time pt does not demonstrate the functional ability to safely return to prior living arrangements. It is recommended that pt receive OT services during hospitalization to increase safety/ADLs for safe return to previous environment.  Prognosis: Good  REQUIRES OT

## 2025-07-16 NOTE — CARE COORDINATION
[facility] Quality Flow/Interdisciplinary Rounds Progress Note    Quality Flow Rounds held on July 16, 2025 at 0930    Disciplines Attending:  Bedside Nurse, , and Nursing Unit Leadership    Barriers to Discharge: clinical status     Anticipated Discharge Date:   7/16/25    Anticipated Discharge Disposition: Home with family     [facility] RISK OF UNPLANNED READMISSION 2.0             12.2 Total Score        Discussed patient goal for the day, patient clinical progression, and barriers to discharge.  Oncology consult, patient unable to tolerate nutrition yesterday, will monitor today. Plan is to discharge home with family.       Patricio Felix  July 16, 2025

## 2025-07-16 NOTE — PROGRESS NOTES
Physical Therapy        Physical Therapy Cancel Note      DATE: 2025    NAME: Prince Gutierrez  MRN: [id number]   : 1945      Patient not seen this date for Physical Therapy due to:    Pt currently sleeping. Pt's daughter requested to allow pt to rest. Pt has been up walking in hallway with family. Will continue to pursue      Electronically signed by JERSEY CANTRELL PT on 2025 at 3:31 PM

## 2025-07-16 NOTE — PROGRESS NOTES
[facility] Medicine  IN-PATIENT SERVICE   [facility]    Progress Note    7/16/2025    10:44 AM    Name:   Prince Gutierrez  MRN:     [id number]     Acct:      [id number]   Room:   331331-01   Day:  8  Admit Date:  7/8/2025 11:20 AM    PCP:   Mulugeta Bruce MD  Code Status:  Full Code    Subjective:     Patient seen for follow-up of gastric cancer.  Discussed pathology report with pathologist today and it appears to be adenocarcinoma of the stomach.  Liver biopsy results are still pending.  Shared information with oncology service.    Medications:     Allergies:    Allergies   Allergen Reactions    Ropinirole Other (See Comments)     Dizziness - syncope       Current Meds:   Scheduled Meds:    sucralfate  1 g PEG Tube 4x Daily AC & HS    metoprolol tartrate  25 mg Oral BID    dexAMETHasone  4 mg IntraVENous Q6H    sodium chloride flush  5-40 mL IntraVENous 2 times per day    famotidine  20 mg Oral BID    pantoprazole (PROTONIX) 40 mg in sodium chloride (PF) 0.9 % 10 mL injection  40 mg IntraVENous Daily    tamsulosin  0.4 mg Oral Daily    atorvastatin  80 mg Oral Nightly    latanoprost  1 drop Both Eyes Nightly    levothyroxine  200 mcg Oral Daily    magnesium oxide  200 mg Oral Daily    melatonin  3 mg Oral Nightly    therapeutic multivitamin-minerals  1 tablet Oral Daily    primidone  100 mg Oral BID    primidone  50 mg Oral QHS    sodium chloride  80 mL IntraVENous Once     Continuous Infusions:    sodium chloride 100 mL/hr at 07/16/25 0127    sodium chloride       PRN Meds: ondansetron **OR** ondansetron, oxyCODONE, morphine, naloxone, sodium chloride flush, sodium chloride, potassium chloride **OR** potassium alternative oral replacement **OR** potassium chloride, magnesium sulfate, polyethylene glycol, acetaminophen **OR** acetaminophen, sodium chloride flush      ROS:       GENERAL          denies fever/chills    sleep was fair   generalized fatigue  SKIN no rash  itching  EYE    vision unremarkable  ENT   no upper airway congestion        nose/throat/ears unremarkable  HEART   no chest pain   no dyspnea   no palpitations  LUNGS   no dyspnea       GI    mild abdominal pressure, occasional nausea, occasional emesis   no urination issues         MUSCULOSKELETAL no significant neck/back/joint  pain  NEUROLOGIC not dizzy no headache mild weakness no memory issues  PSYCHIATRIC   no significant anxiety or confusion or depression    VASCULAR no edema   bruising      Data:     Vitals:  /75   Pulse 56   Temp 97.3 °F (36.3 °C) (Oral)   Resp 16   Ht 1.88 m (6' 2.02\")   Wt 83 kg (182 lb 15.7 oz)   SpO2 96%   BMI 23.48 kg/m²   Temp (24hrs), Av.7 °F (36.5 °C), Min:97.3 °F (36.3 °C), Max:98.4 °F (36.9 °C)    No results for input(s): \"POCGLU\" in the last 72 hours.    I/O (24Hr):    Intake/Output Summary (Last 24 hours) at 2025 1044  Last data filed at 2025 0756  Gross per 24 hour   Intake 445 ml   Output --   Net 445 ml       Labs:  Hematology:  Recent Labs     07/15/25  0716 25  0714   WBC 7.2 8.0   RBC 4.37* 4.34*   HGB 13.0* 13.0*   HCT 37.5* 37.5*   MCV 85.9 86.3   MCH 29.7 29.8   MCHC 34.6 34.6   RDW 12.8 13.1    157   MPV 8.0 7.8     Chemistry:  Recent Labs     07/15/25  0716 25  0714   * 135*   K 4.0 4.4    100   CO2 24 23   GLUCOSE 135* 117*   BUN 23 21   CREATININE 0.7 0.7   ANIONGAP 10 12   LABGLOM >90 >90   CALCIUM 10.3 10.3     Recent Labs     07/15/25  0716 25  0714   AST 70* 65*   ALT 37 32   ALKPHOS 158* 160*   BILITOT 0.5 0.5     ABG:No results found for: \"POCPH\", \"PHART\", \"PH\", \"POCPCO2\", \"UCU0BSP\", \"PCO2\", \"POCPO2\", \"PO2ART\", \"PO2\", \"POCHCO3\", \"NOB2YUC\", \"HCO3\", \"NBEA\", \"PBEA\", \"BEART\", \"BE\", \"THGBART\", \"THB\", \"MVD9JPN\", \"VLHR6ILW\", \"N6PDWLVN\", \"O2SAT\", \"FIO2\"  No results found for: \"SPECIAL\"  No results found for: \"CULTURE\"    Radiology:  US BIOPSY LIVER PERCUTANEOUS  Result Date: 2025  Successful ultrasound-guided

## 2025-07-17 ENCOUNTER — TELEPHONE (OUTPATIENT)
Age: 80
End: 2025-07-17

## 2025-07-17 VITALS
DIASTOLIC BLOOD PRESSURE: 76 MMHG | HEIGHT: 74 IN | OXYGEN SATURATION: 96 % | WEIGHT: 182.98 LBS | SYSTOLIC BLOOD PRESSURE: 109 MMHG | BODY MASS INDEX: 23.48 KG/M2 | TEMPERATURE: 97.7 F | HEART RATE: 62 BPM | RESPIRATION RATE: 16 BRPM

## 2025-07-17 LAB — SURGICAL PATHOLOGY REPORT: NORMAL

## 2025-07-17 PROCEDURE — 2500000003 HC RX 250 WO HCPCS: Performed by: SURGERY

## 2025-07-17 PROCEDURE — 6370000000 HC RX 637 (ALT 250 FOR IP): Performed by: FAMILY MEDICINE

## 2025-07-17 PROCEDURE — 6360000002 HC RX W HCPCS: Performed by: INTERNAL MEDICINE

## 2025-07-17 PROCEDURE — 99239 HOSP IP/OBS DSCHRG MGMT >30: CPT | Performed by: FAMILY MEDICINE

## 2025-07-17 PROCEDURE — 6360000002 HC RX W HCPCS: Performed by: SURGERY

## 2025-07-17 PROCEDURE — 6370000000 HC RX 637 (ALT 250 FOR IP): Performed by: SURGERY

## 2025-07-17 RX ORDER — FAMOTIDINE 20 MG/1
20 TABLET, FILM COATED ORAL 2 TIMES DAILY
Qty: 60 TABLET | Refills: 1 | Status: SHIPPED | OUTPATIENT
Start: 2025-07-17

## 2025-07-17 RX ORDER — DEXAMETHASONE 4 MG/1
4 TABLET ORAL 3 TIMES DAILY
Qty: 21 TABLET | Refills: 0 | Status: SHIPPED | OUTPATIENT
Start: 2025-07-17 | End: 2025-07-24 | Stop reason: SDUPTHER

## 2025-07-17 RX ORDER — SUCRALFATE 1 G/1
1 TABLET ORAL
Qty: 120 TABLET | Refills: 1 | Status: SHIPPED | OUTPATIENT
Start: 2025-07-17

## 2025-07-17 RX ORDER — OXYCODONE HYDROCHLORIDE 5 MG/1
5 TABLET ORAL EVERY 4 HOURS PRN
Qty: 30 TABLET | Refills: 0 | Status: SHIPPED | OUTPATIENT
Start: 2025-07-17 | End: 2025-07-20

## 2025-07-17 RX ORDER — METOPROLOL TARTRATE 25 MG/1
25 TABLET, FILM COATED ORAL 2 TIMES DAILY
Qty: 60 TABLET | Refills: 3 | Status: SHIPPED | OUTPATIENT
Start: 2025-07-17

## 2025-07-17 RX ORDER — ONDANSETRON 4 MG/1
4 TABLET, ORALLY DISINTEGRATING ORAL EVERY 4 HOURS PRN
Qty: 60 TABLET | Refills: 0 | Status: SHIPPED | OUTPATIENT
Start: 2025-07-17

## 2025-07-17 RX ADMIN — LEVOTHYROXINE SODIUM 200 MCG: 125 TABLET ORAL at 06:15

## 2025-07-17 RX ADMIN — Medication 1 TABLET: at 08:45

## 2025-07-17 RX ADMIN — FAMOTIDINE 20 MG: 20 TABLET, FILM COATED ORAL at 08:47

## 2025-07-17 RX ADMIN — DEXAMETHASONE SODIUM PHOSPHATE 4 MG: 4 INJECTION, SOLUTION INTRA-ARTICULAR; INTRALESIONAL; INTRAMUSCULAR; INTRAVENOUS; SOFT TISSUE at 01:37

## 2025-07-17 RX ADMIN — SODIUM CHLORIDE, PRESERVATIVE FREE 40 MG: 5 INJECTION INTRAVENOUS at 08:38

## 2025-07-17 RX ADMIN — MAGNESIUM GLUCONATE 500 MG ORAL TABLET 200 MG: 500 TABLET ORAL at 08:46

## 2025-07-17 RX ADMIN — SUCRALFATE 1 G: 1 TABLET ORAL at 11:22

## 2025-07-17 RX ADMIN — METOPROLOL TARTRATE 25 MG: 25 TABLET, FILM COATED ORAL at 08:48

## 2025-07-17 RX ADMIN — OXYCODONE HYDROCHLORIDE 5 MG: 5 TABLET ORAL at 04:44

## 2025-07-17 RX ADMIN — ONDANSETRON 4 MG: 4 TABLET, ORALLY DISINTEGRATING ORAL at 04:04

## 2025-07-17 RX ADMIN — SUCRALFATE 1 G: 1 TABLET ORAL at 06:15

## 2025-07-17 RX ADMIN — DEXAMETHASONE SODIUM PHOSPHATE 4 MG: 4 INJECTION, SOLUTION INTRA-ARTICULAR; INTRALESIONAL; INTRAMUSCULAR; INTRAVENOUS; SOFT TISSUE at 06:15

## 2025-07-17 RX ADMIN — PRIMIDONE 100 MG: 50 TABLET ORAL at 08:46

## 2025-07-17 RX ADMIN — TAMSULOSIN HYDROCHLORIDE 0.4 MG: 0.4 CAPSULE ORAL at 08:48

## 2025-07-17 ASSESSMENT — PAIN - FUNCTIONAL ASSESSMENT: PAIN_FUNCTIONAL_ASSESSMENT: ACTIVITIES ARE NOT PREVENTED

## 2025-07-17 ASSESSMENT — PAIN DESCRIPTION - DESCRIPTORS: DESCRIPTORS: THROBBING

## 2025-07-17 ASSESSMENT — PAIN DESCRIPTION - ORIENTATION: ORIENTATION: LEFT

## 2025-07-17 ASSESSMENT — PAIN SCALES - GENERAL
PAINLEVEL_OUTOF10: 4
PAINLEVEL_OUTOF10: 8

## 2025-07-17 ASSESSMENT — PAIN DESCRIPTION - LOCATION: LOCATION: SHOULDER

## 2025-07-17 NOTE — CARE COORDINATION
[facility] Quality Flow/Interdisciplinary Rounds Progress Note    Quality Flow Rounds held on July 17, 2025 at 0930    Disciplines Attending:  Bedside Nurse, , and Nursing Unit Leadership    Barriers to Discharge: clinical status     Anticipated Discharge Date:   7/17/25    Anticipated Discharge Disposition: Home with [facility] RISK OF UNPLANNED READMISSION 2.0             12.2 Total Score        Discussed patient goal for the day, patient clinical progression, and barriers to discharge.  Patient discharging today. DENISE spoke with Marianne at [facility] to provide update on patient discharge. Marianne confirms she has orders for patients tube feed.       Patricio Felix  July 17, 2025

## 2025-07-17 NOTE — TELEPHONE ENCOUNTER
Care Transitions Initial Follow Up Call    Outreach made within 2 business days of discharge: Yes    Patient: Prince Gutierrez Patient : 1945   MRN: [id number]  Reason for Admission: Metastasis from gastric cancer  Discharge Date: 25       Spoke with: Patient's wife    Discharge department/facility: [facility]    TCM Interactive Patient Contact:  Was patient able to fill all prescriptions: Yes  Was patient instructed to bring all medications to the follow-up visit: No.  Is patient taking all medications as directed in the discharge summary? Yes  Does patient understand their discharge instructions: Yes  Does patient have questions or concerns that need addressed prior to 7-14 day follow up office visit: no    Additional needs identified to be addressed with provider  No needs identified             Scheduled appointment with PCP within 7-14 days    Follow Up  Future Appointments   Date Time Provider Department Center   2025  4:20 PM Mulugeta Bruce MD [place] [facility] DEP   2025 10:00 AM Mulugeta Bruce MD [place] [facility] DEP       Ade Murillo [facility]

## 2025-07-17 NOTE — DISCHARGE SUMMARY
[facility] Medicine  IN-PATIENT SERVICE   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 *      Admission Condition:  poor     Discharged Condition: fair    Hospital Stay:     Hospital Course:  Prince Gutierrez is a 80 y.o. 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    Admitted after abnormal imaging of showing a masslike lesion of GE junction and hepatic lesions.  He was having headaches and vision changes and CT/MRI brain revealed metastatic lesions.  [facility]  He had complaints of poor appetite, weight loss and weakness.    He was evaluated by GI and underwent an EGD, general surgery placed a PEG.  He was evaluated by heme/onc and a liver biopsy was done. Results pending at time of discharge.       On date of discharge, he is feeling well.  He denies CP/SOB. No N/V.  Some diarrhea.  He is complaining of left trapezius pain.  Family feels comfortable with TF use. He was able to take some pills with apple sauce today.        Significant

## 2025-07-17 NOTE — PLAN OF CARE
Problem: Discharge Planning  Goal: Discharge to home or other facility with appropriate resources  7/17/2025 0021 by Tena Hernandez RN  Outcome: Progressing   Patient actively participates in ADL's and decision making regarding plan of care  Problem: Safety - Adult  Goal: Free from fall injury  7/17/2025 0021 by Tena Hernandez RN  Outcome: Progressing   No falls/injuries this shift, bed in lowest position, brakes on, bed alarm on, call light in reach, side rails up x2  Problem: Gastrointestinal - Adult  Goal: Minimal or absence of nausea and vomiting  7/17/2025 0021 by Tena Hernandez RN  Outcome: Progressing   Tolerating tube feed boluses, no nausea or vomiting  Problem: Pain  Goal: Verbalizes/displays adequate comfort level or baseline comfort level  7/17/2025 0021 by Tena Hernandez RN  Outcome: Progressing   Denies pain this shift  Problem: Skin/Tissue Integrity  Goal: Skin integrity remains intact  Description: 1.  Monitor for areas of redness and/or skin breakdown  2.  Assess vascular access sites hourly  3.  Every 4-6 hours minimum:  Change oxygen saturation probe site  4.  Every 4-6 hours:  If on nasal continuous positive airway pressure, respiratory therapy assess nares and determine need for appliance change or resting period  7/17/2025 0021 by Tena Hernandez RN  Outcome: Progressing   No new skin breakdown noted, no new signs/symptoms of infection, continue to monitor labwork including WBC, medications administered per physician orders

## 2025-07-18 ENCOUNTER — CARE COORDINATION (OUTPATIENT)
Dept: CARE COORDINATION | Age: 80
End: 2025-07-18

## 2025-07-18 NOTE — CARE COORDINATION
Care Transitions Note    Initial Call - Call within 2 business days of discharge: Yes    Attempted to reach patient for transitions of care follow up. Unable to reach patient.    Outreach Attempts:   HIPAA compliant voicemail left for patient.     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 Admission (Discharged) [facility] Mulugeta Bruce MD            Was this an external facility discharge? No    Follow Up Appointment:   Patient has hospital follow up appointment scheduled within 7 days of discharge.    Future Appointments         Provider Specialty Dept Phone    2025 4:20 PM Mulugeta Bruce MD Primary Care [phone]    2025 10:00 AM Mulugeta Bruce MD Primary Care [phone]            Plan for follow-up on next business day. 2nd attempt 24 hr HFU call     Zully Hernandez RN

## 2025-07-19 ENCOUNTER — APPOINTMENT (OUTPATIENT)
Dept: CT IMAGING | Age: 80
End: 2025-07-19
Payer: MEDICARE

## 2025-07-19 ENCOUNTER — APPOINTMENT (OUTPATIENT)
Dept: GENERAL RADIOLOGY | Age: 80
End: 2025-07-19
Payer: MEDICARE

## 2025-07-19 ENCOUNTER — HOSPITAL ENCOUNTER (EMERGENCY)
Age: 80
Discharge: HOME | End: 2025-07-19
Attending: EMERGENCY MEDICINE
Payer: MEDICARE

## 2025-07-19 VITALS
WEIGHT: 173 LBS | BODY MASS INDEX: 22.2 KG/M2 | HEART RATE: 64 BPM | SYSTOLIC BLOOD PRESSURE: 107 MMHG | TEMPERATURE: 97.6 F | RESPIRATION RATE: 18 BRPM | HEIGHT: 74 IN | DIASTOLIC BLOOD PRESSURE: 67 MMHG | OXYGEN SATURATION: 99 %

## 2025-07-19 DIAGNOSIS — R10.84 GENERALIZED ABDOMINAL PAIN: Primary | ICD-10-CM

## 2025-07-19 DIAGNOSIS — K59.00 CONSTIPATION, UNSPECIFIED CONSTIPATION TYPE: ICD-10-CM

## 2025-07-19 LAB
ALBUMIN SERPL-MCNC: 3.4 G/DL (ref 3.5–5.2)
ALBUMIN/GLOB SERPL: 1.3 {RATIO} (ref 1–2.5)
ALP SERPL-CCNC: 145 U/L (ref 40–129)
ALT SERPL-CCNC: 33 U/L (ref 10–50)
ANION GAP SERPL CALCULATED.3IONS-SCNC: 10 MMOL/L (ref 9–16)
AST SERPL-CCNC: 61 U/L (ref 10–50)
BASOPHILS # BLD: 0.1 K/UL (ref 0–0.2)
BASOPHILS NFR BLD: 1 % (ref 0–2)
BILIRUB SERPL-MCNC: 0.4 MG/DL (ref 0–1.2)
BUN SERPL-MCNC: 25 MG/DL (ref 8–23)
CALCIUM SERPL-MCNC: 9.7 MG/DL (ref 8.6–10.4)
CHLORIDE SERPL-SCNC: 99 MMOL/L (ref 98–107)
CO2 SERPL-SCNC: 25 MMOL/L (ref 20–31)
CREAT SERPL-MCNC: 0.8 MG/DL (ref 0.7–1.2)
EOSINOPHIL # BLD: 0.1 K/UL (ref 0–0.4)
EOSINOPHILS RELATIVE PERCENT: 1 % (ref 1–4)
ERYTHROCYTE [DISTWIDTH] IN BLOOD BY AUTOMATED COUNT: 13 % (ref 12.5–15.4)
GFR, ESTIMATED: 89 ML/MIN/1.73M2
GLUCOSE SERPL-MCNC: 110 MG/DL (ref 74–99)
HCT VFR BLD AUTO: 34.4 % (ref 41–53)
HGB BLD-MCNC: 11.7 G/DL (ref 13.5–17.5)
LACTATE BLDV-SCNC: 2.4 MMOL/L (ref 0.5–2.2)
LIPASE SERPL-CCNC: 22 U/L (ref 13–60)
LYMPHOCYTES NFR BLD: 0.6 K/UL (ref 1–4.8)
LYMPHOCYTES RELATIVE PERCENT: 6 % (ref 24–44)
MCH RBC QN AUTO: 29.4 PG (ref 26–34)
MCHC RBC AUTO-ENTMCNC: 34.1 G/DL (ref 31–37)
MCV RBC AUTO: 86.3 FL (ref 80–100)
MONOCYTES NFR BLD: 0.7 K/UL (ref 0.1–1.2)
MONOCYTES NFR BLD: 8 % (ref 2–11)
NEUTROPHILS NFR BLD: 84 % (ref 36–66)
NEUTS SEG NFR BLD: 7.5 K/UL (ref 1.8–7.7)
PLATELET # BLD AUTO: 158 K/UL (ref 140–450)
PMV BLD AUTO: 7.8 FL (ref 6–12)
POTASSIUM SERPL-SCNC: 3.8 MMOL/L (ref 3.7–5.3)
PROT SERPL-MCNC: 6 G/DL (ref 6.6–8.7)
RBC # BLD AUTO: 3.99 M/UL (ref 4.5–5.9)
SODIUM SERPL-SCNC: 134 MMOL/L (ref 136–145)
WBC OTHER # BLD: 8.8 K/UL (ref 3.5–11)

## 2025-07-19 PROCEDURE — 74176 CT ABD & PELVIS W/O CONTRAST: CPT

## 2025-07-19 PROCEDURE — 96361 HYDRATE IV INFUSION ADD-ON: CPT | Performed by: EMERGENCY MEDICINE

## 2025-07-19 PROCEDURE — 83690 ASSAY OF LIPASE: CPT

## 2025-07-19 PROCEDURE — 96360 HYDRATION IV INFUSION INIT: CPT | Performed by: EMERGENCY MEDICINE

## 2025-07-19 PROCEDURE — 83605 ASSAY OF LACTIC ACID: CPT

## 2025-07-19 PROCEDURE — 6370000000 HC RX 637 (ALT 250 FOR IP): Performed by: NURSE PRACTITIONER

## 2025-07-19 PROCEDURE — 74018 RADEX ABDOMEN 1 VIEW: CPT

## 2025-07-19 PROCEDURE — 99285 EMERGENCY DEPT VISIT HI MDM: CPT | Performed by: EMERGENCY MEDICINE

## 2025-07-19 PROCEDURE — 80053 COMPREHEN METABOLIC PANEL: CPT

## 2025-07-19 PROCEDURE — 6360000004 HC RX CONTRAST MEDICATION: Performed by: NURSE PRACTITIONER

## 2025-07-19 PROCEDURE — 2580000003 HC RX 258: Performed by: EMERGENCY MEDICINE

## 2025-07-19 PROCEDURE — 85025 COMPLETE CBC W/AUTO DIFF WBC: CPT

## 2025-07-19 RX ORDER — 0.9 % SODIUM CHLORIDE 0.9 %
1000 INTRAVENOUS SOLUTION INTRAVENOUS ONCE
Status: COMPLETED | OUTPATIENT
Start: 2025-07-19 | End: 2025-07-19

## 2025-07-19 RX ORDER — MAGNESIUM HYDROXIDE/ALUMINUM HYDROXICE/SIMETHICONE 120; 1200; 1200 MG/30ML; MG/30ML; MG/30ML
30 SUSPENSION ORAL ONCE
Status: COMPLETED | OUTPATIENT
Start: 2025-07-19 | End: 2025-07-19

## 2025-07-19 RX ORDER — DIATRIZOATE MEGLUMINE AND DIATRIZOATE SODIUM 660; 100 MG/ML; MG/ML
30 SOLUTION ORAL; RECTAL
Status: DISCONTINUED | OUTPATIENT
Start: 2025-07-19 | End: 2025-07-19 | Stop reason: HOSPADM

## 2025-07-19 RX ADMIN — DIATRIZOATE MEGLUMINE AND DIATRIZOATE SODIUM 30 ML: 660; 100 LIQUID ORAL; RECTAL at 18:45

## 2025-07-19 RX ADMIN — SODIUM CHLORIDE 1000 ML: 0.9 INJECTION, SOLUTION INTRAVENOUS at 16:10

## 2025-07-19 RX ADMIN — ALUMINUM HYDROXIDE, MAGNESIUM HYDROXIDE, AND SIMETHICONE 30 ML: 200; 200; 20 SUSPENSION ORAL at 17:09

## 2025-07-19 ASSESSMENT — PAIN - FUNCTIONAL ASSESSMENT: PAIN_FUNCTIONAL_ASSESSMENT: NONE - DENIES PAIN

## 2025-07-19 NOTE — DISCHARGE INSTRUCTIONS
Magnesium citrate as we discussed to help with constipation.  Simethicone to help with gas and distention. Return to the ER for any nausea, vomiting, pain, or any other concerns.

## 2025-07-19 NOTE — ED PROVIDER NOTES
[facility] EMERGENCY DEPARTMENT  EMERGENCY DEPARTMENT ENCOUNTER      Pt Name: Prince Gutierrez  MRN: [id number]  Birthdate 1945  Date of evaluation: 7/19/2025  Provider: DOMO Sin CNP  7:41 PM    CHIEF COMPLAINT       Chief Complaint   Patient presents with    Abdominal Pain     Sudden onset abdominal pain after tube feeding appx 1300 today; cancer pt, has not started chemo/rad yet; given norco PO prior to EMS arrival         HISTORY OF PRESENT ILLNESS    Prince Gutierrez is a 80 y.o. male who presents to the emergency department for evaluation of abdominal pain after tube feeding.  Patient states that he recently had a gastric tube placed early last week for and based on known gastric mass with metastasis to the liver and brain.  Has been doing well with the tube feeding but today, shortly after he finished the tube feed he developed sudden onset severe pain lasted for several minutes and is now gone.  Patient arrives in good condition with no complaints    HPI    Nursing Notes were reviewed.    REVIEW OF SYSTEMS       Review of Systems   All other systems reviewed and are negative.      Except as noted above the remainder of the review of systems was reviewed and negative.       PAST MEDICAL HISTORY     Past Medical History:   Diagnosis Date    Arthritis     Atrial fibrillation (HCC)     BPH (benign prostatic hyperplasia)     CAD (coronary artery disease) 02/2022    x3 stents    Essential tremor     Glaucoma     Hyperlipidemia     Hypertension     Hypothyroid     Scoliosis     TIA (transient ischemic attack)     Under care of team     cardiology- Dr. Mchugh         SURGICAL HISTORY       Past Surgical History:   Procedure Laterality Date    CARDIOVERSION  03/2022    CATARACT EXTRACTION Right 08/07/2024    CHOLECYSTECTOMY      COLONOSCOPY  11/2015    luz marina, repeat 10yrs    CORONARY ANGIOPLASTY WITH STENT PLACEMENT  02/2022    Jeison, LAD 80-90%, 1st diag 50%, 2nd diag 60%, 1st obtuse      Socioeconomic History    Marital status:    Tobacco Use    Smoking status: Never     Passive exposure: Never    Smokeless tobacco: Never   Vaping Use    Vaping status: Never Used   Substance and Sexual Activity    Alcohol use: Yes     Comment: occaionally1-2x per week    Drug use: Never    Sexual activity: Defer     Comment: Declined to answer   Social History Narrative    son and daughter live in [place], granddaugther going to [facility]       son with CP, , [email or web address]      Social Drivers of Health     Financial Resource Strain: Not At Risk (7/2/2025)    Received from [facility] - Financial Strain     Difficulty of Paying Living Expenses: Not hard at all   Food Insecurity: No Food Insecurity (7/8/2025)    Hunger Vital Sign     Worried About Running Out of Food in the Last Year: Never true     Ran Out of Food in the Last Year: Never true   Transportation Needs: No Transportation Needs (7/8/2025)    PRAPARE - Transportation     Lack of Transportation (Medical): No     Lack of Transportation (Non-Medical): No   Physical Activity: Sufficiently Active (2/27/2025)    Exercise Vital Sign     Days of Exercise per Week: 5 days     Minutes of Exercise per Session: 60 min   Social Connections: Low Risk  (7/2/2025)    Received from [facility]    Social Isolation     Within the last 12 months, how often do you feel isolated from others?: Never    [facility]    [place] Safety [place]   Housing Stability: Low Risk  (7/8/2025)    Housing Stability Vital Sign     Unable to Pay for Housing in the Last Year: No     Number of Times Moved in the Last Year: 0     Homeless in the Last Year: No       SCREENINGS                         [place] Coma Scale  Eye Opening: Spontaneous  Best Verbal Response: Oriented  Best Motor Response: Obeys commands  Jim Coma Scale Score: 15

## 2025-07-19 NOTE — ED PROVIDER NOTES
[facility] Emergency Department  [address]  [place] [address]  Phone: [phone]  Fax: [phone]      Attending Physician Attestation          CHIEF COMPLAINT       Chief Complaint   Patient presents with    Abdominal Pain     Sudden onset abdominal pain after tube feeding appx 1300 today; cancer pt, has not started chemo/rad yet; given norco PO prior to EMS arrival       DIAGNOSTIC RESULTS     LABS:  Labs Reviewed   CBC WITH AUTO DIFFERENTIAL - Abnormal; Notable for the following components:       Result Value    RBC 3.99 (*)     Hemoglobin 11.7 (*)     Hematocrit 34.4 (*)     Neutrophils % 84 (*)     Lymphocytes % 6 (*)     Lymphocytes Absolute 0.60 (*)     All other components within normal limits   COMPREHENSIVE METABOLIC PANEL - Abnormal; Notable for the following components:    Sodium 134 (*)     Glucose 110 (*)     BUN 25 (*)     Total Protein 6.0 (*)     Albumin 3.4 (*)     Alkaline Phosphatase 145 (*)     AST 61 (*)     All other components within normal limits   LACTIC ACID - Abnormal; Notable for the following components:    Lactic Acid 2.4 (*)     All other components within normal limits   LIPASE       All other labs were within normal range or not returned as of this dictation.    RADIOLOGY:  XR ABDOMEN (KUB) (SINGLE AP VIEW)   ED Interpretation   Proper placement of g tube      CT ABDOMEN PELVIS WO CONTRAST Additional Contrast? None   Final Result   1. The G-tube balloon does not appear to be within the stomach. However, no   adjacent gas or fluid.   2. The liver lesions are larger.   3. 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   4. The gastric wall thickening is known.   5. Metastatic lymphadenopathy without change               EMERGENCY DEPARTMENT COURSE:   Vitals:    Vitals:    07/19/25 1600 07/19/25 1615 07/19/25 1630 07/19/25 1645   BP: 111/69 110/71 111/70 107/67   Pulse:    64   Resp:       Temp:       TempSrc:       SpO2:  96% 98% 99%   Weight:

## 2025-07-21 ENCOUNTER — TELEPHONE (OUTPATIENT)
Age: 80
End: 2025-07-21

## 2025-07-21 ENCOUNTER — OFFICE VISIT (OUTPATIENT)
Age: 80
End: 2025-07-21

## 2025-07-21 ENCOUNTER — CARE COORDINATION (OUTPATIENT)
Dept: CARE COORDINATION | Age: 80
End: 2025-07-21

## 2025-07-21 VITALS
WEIGHT: 182.4 LBS | TEMPERATURE: 98.1 F | DIASTOLIC BLOOD PRESSURE: 58 MMHG | HEIGHT: 74 IN | BODY MASS INDEX: 23.41 KG/M2 | OXYGEN SATURATION: 99 % | SYSTOLIC BLOOD PRESSURE: 90 MMHG | HEART RATE: 69 BPM

## 2025-07-21 DIAGNOSIS — C16.9 METASTASIS FROM GASTRIC CANCER (HCC): ICD-10-CM

## 2025-07-21 DIAGNOSIS — Z09 HOSPITAL DISCHARGE FOLLOW-UP: Primary | ICD-10-CM

## 2025-07-21 DIAGNOSIS — G25.0 ESSENTIAL TREMOR: ICD-10-CM

## 2025-07-21 DIAGNOSIS — I10 ESSENTIAL HYPERTENSION: ICD-10-CM

## 2025-07-21 DIAGNOSIS — C79.9 METASTASIS FROM GASTRIC CANCER (HCC): ICD-10-CM

## 2025-07-21 DIAGNOSIS — C16.9 PRIMARY CANCER OF STOMACH WITH METASTASIS TO OTHER SITE (HCC): ICD-10-CM

## 2025-07-21 DIAGNOSIS — I48.0 PAROXYSMAL ATRIAL FIBRILLATION (HCC): ICD-10-CM

## 2025-07-21 DIAGNOSIS — R10.10 PAIN OF UPPER ABDOMEN: ICD-10-CM

## 2025-07-21 DIAGNOSIS — I25.10 CORONARY ARTERY DISEASE INVOLVING NATIVE CORONARY ARTERY OF NATIVE HEART WITHOUT ANGINA PECTORIS: ICD-10-CM

## 2025-07-21 DIAGNOSIS — E44.0 MODERATE PROTEIN MALNUTRITION: ICD-10-CM

## 2025-07-21 RX ORDER — MORPHINE SULFATE 100 MG/5ML
5 SOLUTION ORAL
Qty: 15 ML | Refills: 0 | Status: SHIPPED | OUTPATIENT
Start: 2025-07-21 | End: 2025-07-22 | Stop reason: SDUPTHER

## 2025-07-21 SDOH — ECONOMIC STABILITY: FOOD INSECURITY: WITHIN THE PAST 12 MONTHS, THE FOOD YOU BOUGHT JUST DIDN'T LAST AND YOU DIDN'T HAVE MONEY TO GET MORE.: NEVER TRUE

## 2025-07-21 SDOH — ECONOMIC STABILITY: FOOD INSECURITY: WITHIN THE PAST 12 MONTHS, YOU WORRIED THAT YOUR FOOD WOULD RUN OUT BEFORE YOU GOT MONEY TO BUY MORE.: NEVER TRUE

## 2025-07-21 ASSESSMENT — PATIENT HEALTH QUESTIONNAIRE - PHQ9
2. FEELING DOWN, DEPRESSED OR HOPELESS: NOT AT ALL
SUM OF ALL RESPONSES TO PHQ QUESTIONS 1-9: 0
1. LITTLE INTEREST OR PLEASURE IN DOING THINGS: NOT AT ALL
SUM OF ALL RESPONSES TO PHQ QUESTIONS 1-9: 0

## 2025-07-21 NOTE — PATIENT INSTRUCTIONS
Prince    Thank you for choosing [facility].  We know you have options when it comes to your healthcare; we appreciate that you chose us. Our goal is to provide exceptional  service and world class care to every patient.  You will be receiving a survey via email or text message asking for your feedback.  Please take a few minutes to share your thoughts about your recent visit. Your comments help us understand what we do well and ways we can improve.  Thank you in advance for your valuable feedback.      Dr. MARICRUZ Reilly

## 2025-07-21 NOTE — CARE COORDINATION
Care Transitions Note    Initial Call - Call within 2 business days of discharge: Yes    Attempted to reach patient for transitions of care follow up. Unable to reach patient.    Outreach Attempts:   HIPAA compliant voicemail left for patient.     Patient: Prince Gutierrez    Patient : 1945   MRN: [id number]    Reason for Admission: Generalized abdominal pain .  Discharge Date: 25  RURS: Readmission Risk Score: 12.1    Last Discharge Facility       Date Complaint Diagnosis Description Type Department Provider    25 Abdominal Pain Generalized abdominal pain ... ED (DISCHARGE) [facility] Rose Guerra MD            Was this an external facility discharge? No    Follow Up Appointment:   Patient has hospital follow up appointment scheduled within 7 days of discharge.    Future Appointments         Provider Specialty Dept Phone    2025 4:20 PM Mulugeta Bruce MD [facility] [phone]    2025 10:00 AM Mulugeta Bruce MD Primary Care [phone]            No further follow-up call indicated     Zully Hernandez RN

## 2025-07-21 NOTE — TELEPHONE ENCOUNTER
[facility]: Nutrition Note  Writer received voicemail from patient's daughter, Madelyn, on 7/17/25 when RD was out of office. Daughter stated patient was just discharged from [facility] on tube feeding and was wondering how long the tube feeding formula could be left out after opening.   Writer returned call today. Daughter states patient has h/o cancer (noted gastric) and had feeding tube placed recently. Pt was sent home from hospital with a large (1 L) container of tube feeding until home supply arrived. Pt now has 8 oz containers of which he uses one whole container at a time via bolus method. Writer discussed placing any unused formula in refrigerator for up to 24 hr after opening, and pull out of refrigerator approx 30 min prior to using in feeding tube to avoid any GI distress. Daughter states pt also has home care now which is helpful. Per Madelyn, pt follows with Hem/Onc, Dr Cerda, at the [facility] [place] and has an upcoming appt. Will defer nutrition management to [facility] dietitian.     Samantha Wells RD, LD, CNSC  Registered Dietitian  [facility]   [phone]

## 2025-07-21 NOTE — PROGRESS NOTES
Post-Discharge Transitional Care  Follow Up      Prince Gutierrez   YOB: 1945    Date of Office Visit: 07/21/2025  Date of Hospital Admission:  07/08/2025  Date of Hospital Discharge: 07/17/2025  Risk of hospital readmission (high >=14%. Medium >=10%) :Readmission Risk Score: 12.1      Care management risk score Rising risk (score 2-5) and Complex Care (Scores >=6): No Risk Score On File     Non face to face  following discharge, date last encounter closed (first attempt may have been earlier): 07/18/2025    Call initiated 2 business days of discharge: Yes    ASSESSMENT/PLAN:   Assessment & Plan         Medical Decision Making: high complexity  No follow-ups on file.    On this date 7/21/2025 I have spent 60 minutes reviewing previous notes, test results and face to face with the patient discussing the diagnosis and importance of compliance with the treatment plan as well as documenting on the day of the visit.       Subjective:   Inpatient course: Discharge summary reviewed- see chart.    History of Present Illness  The patient presents for follow-up from a recent hospitalization. The primary reason for this visit is to address ongoing issues related to metastatic cancer and complications from tube feeding. He is accompanied by his family.    He was directly admitted after a recent ER visit with known imaging findings concerning for metastatic cancer involving the GE junction, liver, adrenal glands, and lungs. 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 A G-tube was placed for feeding, and he is currently receiving feeds through the tube. He returned to the ER on 07/20/2025 due to worsening abdominal pain after tube feeds. Imaging confirmed the placement of the G-tube, and general surgery was consulted before he was sent home. Recent

## 2025-07-22 ENCOUNTER — TELEPHONE (OUTPATIENT)
Age: 80
End: 2025-07-22

## 2025-07-22 NOTE — TELEPHONE ENCOUNTER
----- Message from Dr. Mulugeta Bruce MD sent at 7/21/2025  9:43 PM EDT -----  Please check printer #4 for handicap placard prescription and place at my work area to sign as I forgot to give this to him at appointment today

## 2025-07-24 ENCOUNTER — TELEPHONE (OUTPATIENT)
Age: 80
End: 2025-07-24

## 2025-07-24 RX ORDER — DEXAMETHASONE 4 MG/1
4 TABLET ORAL 3 TIMES DAILY
Qty: 21 TABLET | Refills: 0 | Status: SHIPPED | OUTPATIENT
Start: 2025-07-24 | End: 2025-07-31

## 2025-07-28 ENCOUNTER — TELEPHONE (OUTPATIENT)
Age: 80
End: 2025-07-28

## 2025-07-28 DIAGNOSIS — C16.9 PRIMARY CANCER OF STOMACH WITH METASTASIS TO OTHER SITE (HCC): Primary | ICD-10-CM

## 2025-07-28 NOTE — TELEPHONE ENCOUNTER
I put in external referral, fax to [facility] palliative care and inform pt or son it has been done

## 2025-07-28 NOTE — TELEPHONE ENCOUNTER
Patient recently diagnosed with cancer and is requesting a referral from [facility] for Pallative care. Fax number for [facility] is [phone]. Call Clive back

## 2025-07-29 ENCOUNTER — TELEPHONE (OUTPATIENT)
Age: 80
End: 2025-07-29

## 2025-07-29 NOTE — TELEPHONE ENCOUNTER
[facility] is calling they can not move forward with the referral because he is outside the service area

## 2025-07-29 NOTE — TELEPHONE ENCOUNTER
Patient son is calling patient is have trouble sleeping because she wakes up in pain he then takes his .25 dose of morphine but its almost too late to take it. Patient son is wondering if he can take it before bed and maybe even increase the dose   Patient saw oncologist today and she is the one that recommend the increase but they want your thoughts on it

## 2025-07-29 NOTE — TELEPHONE ENCOUNTER
Noted, please inform son of this, did they have a back up choice they wanted to pursue? [facility]..

## 2025-07-29 NOTE — TELEPHONE ENCOUNTER
Yes that is fine to just take morphine prior to bed, I would take 0.3mL or up to 0.5mL max of morphine at bedtime (usually dose is 0.25mL as initially ordered to be used every 3hrs prn) if vial allows to measure in 0.1mL increments, than titrate upwards by this amount/0.1mL each night to find lowest dose that is effective for him to sleep longer through night, but do not go beyond 0.5mL at HS without notifying/consulting us

## 2025-07-30 NOTE — TELEPHONE ENCOUNTER
Patient Wife was recommend to use Milk of Magnesia for his stomach issues ( having issues gong to the bathroom) but patient has stomach cancer so patient wants to make sure its safe to use. Patient is also taking docusate sodium liquid  and it says take with fruit juice or milk because of throat irration patient wife is wondering if he can take with water because that is all he's drinking

## 2025-07-30 NOTE — TELEPHONE ENCOUNTER
Ty from [facility] called to say that the zip code was smeared on the referral and they thought it was [id number] which is out of there area and when they called the wife she stated that the zip code was [address] and so they are able to go out and see him and they have been.

## 2025-07-30 NOTE — TELEPHONE ENCOUNTER
Patient wife states that they have been coming out and they are coming out today so she is very confused and will call them to see what is going on

## 2025-07-30 NOTE — TELEPHONE ENCOUNTER
Yes milk of magnesia fine to use and ok to take docusate with water  Please confirm whether or not he stopped sucralafate? (Powder med he was put on in the hospital to try to help with stomach discomfort) I believe at last appointment I suggested trying to discontinue it as it may not be providing any benefit)

## 2025-07-31 ENCOUNTER — TELEPHONE (OUTPATIENT)
Age: 80
End: 2025-07-31

## 2025-08-03 RX ORDER — AZITHROMYCIN 250 MG/1
TABLET, FILM COATED ORAL
Qty: 1 PACKET | Refills: 0 | Status: SHIPPED | OUTPATIENT
Start: 2025-08-03

## 2025-08-05 ENCOUNTER — TELEPHONE (OUTPATIENT)
Age: 80
End: 2025-08-05

## 2025-08-12 ENCOUNTER — TELEPHONE (OUTPATIENT)
Age: 80
End: 2025-08-12

## (undated) DEVICE — GOWN,AURORA,NONREINFORCED,LARGE: Brand: MEDLINE

## (undated) DEVICE — APPLICATOR MEDICATED 26 CC SOLUTION HI LT ORNG CHLORAPREP

## (undated) DEVICE — SUTURE MCRYL SZ 3 0 L18IN ABSRB UD PS 2 3 8 CIR REV CUT NDL MCP497G

## (undated) DEVICE — SOLUTION IV 500ML 0.9% SOD BOTTLE CHL LTWT DURABLE SHATTERPROOF

## (undated) DEVICE — NEPTUNE E-SEP SMOKE EVACUATION PENCIL, COATED, 70MM BLADE, PUSH BUTTON SWITCH: Brand: NEPTUNE E-SEP

## (undated) DEVICE — PACK CATARACT SURGI+CARE CUSTOM

## (undated) DEVICE — SWABSTICK MEDICATED 10% POVIDONE IOD PVP TRIPE ANTISEP PREP 3 PER PK

## (undated) DEVICE — CORD,CAUTERY,BIPOLAR,STERILE: Brand: MEDLINE

## (undated) DEVICE — SUTURE PERMAHAND SZ 0 L30IN NONABSORBABLE BLK SILK BRAID A306H

## (undated) DEVICE — DECANTER BAG 9": Brand: MEDLINE INDUSTRIES, INC.

## (undated) DEVICE — 1010 S-DRAPE TOWEL DRAPE 10/BX: Brand: STERI-DRAPE™

## (undated) DEVICE — HYPODERMIC SAFETY NEEDLE: Brand: MAGELLAN

## (undated) DEVICE — SUTURE MONOCRYL + SZ 4-0 L27IN ABSRB UD L19MM PS-2 3/8 CIR MCP426H

## (undated) DEVICE — DRESSING HYDROFIBER AQUACEL AG ADVANTAGE 3.5X10 IN

## (undated) DEVICE — CLOTH PRE OP W9XL10.5IN 2% CHG FRAGRANCE RNS FREE READYPREP

## (undated) DEVICE — SUTURE VCRL + SZ 2-0 L27IN ABSRB CLR CT-1 1/2 CIR TAPERCUT VCP259H

## (undated) DEVICE — COVER,C-ARM,41X74: Brand: MEDLINE

## (undated) DEVICE — STANDARD HYPODERMIC NEEDLE,POLYPROPYLENE HUB: Brand: MONOJECT

## (undated) DEVICE — PATIENT CARE KIT ADJ ARM BRD PERINL POST CVR BLU FOAM

## (undated) DEVICE — FORCEPS BX L240CM JAW DIA2.8MM L CAP W/ NDL MIC MESH TOOTH

## (undated) DEVICE — SOLUTION SCRB 4OZ 4% CHG H2O AIDED FOR PREOPERATIVE SKIN

## (undated) DEVICE — SATINCRESCENT® KNIFE ANGLED BEVEL UP: Brand: SATINCRESCENT®

## (undated) DEVICE — MICROSURGICAL INSTRUMENT IRR. CYSTITOME 27GA FORMED-REVERSE CUTTING: Brand: ALCON

## (undated) DEVICE — TOWEL,OR,DSP,ST,WHITE,DLX,2/PK,40PK/CS: Brand: MEDLINE

## (undated) DEVICE — ELECTRODE PT RET AD L9FT HI MOIST COND ADH HYDRGEL CORDED

## (undated) DEVICE — DEFENDO VALVE AND CONNETOR KIT 4-PIECE KIT (SINGLE-USE AIR WATER AND SUCTION AND BIOPSY VALVES AND ENDOGATOR CONNECTOR: Brand: DEFENDO VALVE AND CONNECTOR KIT

## (undated) DEVICE — DRAPE,U/ SHT,SPLIT,PLAS,STERIL: Brand: MEDLINE

## (undated) DEVICE — MIC* SAFETY PERCUTANEOUS ENDOSCOPIC GASTROSTOMY PEG KIT - 20 FR - PULL: Brand: MIC PEG TUBE

## (undated) DEVICE — GLOVE SURG SZ 75 CRM LTX FREE POLYISOPRENE POLYMER BEAD ANTI

## (undated) DEVICE — SOLUTION IRRIG 1000ML 0.9% SOD CHL USP POUR PLAS BTL

## (undated) DEVICE — MICROSURGICAL INSTRUMENT ANTERIOR CHAMBER CANNULA 27GA: Brand: ALCON

## (undated) DEVICE — ELECTRODE ES L3IN S STL BLDE INSUL DISP VALLEYLAB EDGE

## (undated) DEVICE — Device

## (undated) DEVICE — DRAPE SURG W41XL74IN CLR FULL SZ C ARM 3 ADH POLY STRP E

## (undated) DEVICE — STRAP,POSITIONING,KNEE/BODY,FOAM,4X60": Brand: MEDLINE

## (undated) DEVICE — SHEET,DRAPE,70X100,STERILE: Brand: MEDLINE

## (undated) DEVICE — GAUZE,SPONGE,4"X4",16PLY,XRAY,STRL,LF: Brand: MEDLINE

## (undated) DEVICE — BLANKET WRM W29.9XL79.1IN UP BODY FORC AIR MISTRAL-AIR

## (undated) DEVICE — ERBE NESSY®PLATE 170 SPLIT; 168CM²; CABLE 3M: Brand: ERBE

## (undated) DEVICE — GLOVE ORANGE PI 7 1/2   MSG9075

## (undated) DEVICE — SYSTEM TISS RETEN TRS

## (undated) DEVICE — ADAPTER CLEANING PORPOISE CLEANING

## (undated) DEVICE — PERRYSBURG ENDO PACK: Brand: MEDLINE INDUSTRIES, INC.

## (undated) DEVICE — LIQUIBAND RAPID ADHESIVE 36/CS 0.8ML: Brand: MEDLINE

## (undated) DEVICE — SOLUTION IRRIG 1000ML STRL H2O USP PLAS POUR BTL

## (undated) DEVICE — POWERPORT ISP M.R.I. IMPLANTABLE PORT WITH ATTACHABLE 8F GROSHONG SINGLE-LUMEN VENOUS CATHETER INTERMEDIATE KIT (WITH SUTURE PLUGS)
Type: IMPLANTABLE DEVICE | Site: OTHER | Status: NON-FUNCTIONAL
Brand: POWERPORT M.R.I., GROSHONG

## (undated) DEVICE — SOLUTION IRRIGATION BAL SALT SOLUTION 500 ML STRL BSS

## (undated) DEVICE — MHPB GEN MINOR PACK: Brand: MEDLINE INDUSTRIES, INC.

## (undated) DEVICE — E-Z CLEAN, NON-STICK, PTFE COATED, ELECTROSURGICAL BLADE ELECTRODE, 6.5 INCH (16.5 CM): Brand: MEGADYNE

## (undated) DEVICE — CLEARCUT® SLIT KNIFE DUAL BEVEL 2.75MM ANGLED: Brand: CLEARCUT®

## (undated) DEVICE — SUTURE VICRYL + SZ 3-0 L27IN ABSRB UD L26MM SH 1/2 CIR VCP416H

## (undated) DEVICE — DRAPE,THYROID,SOFT,STERILE: Brand: MEDLINE

## (undated) DEVICE — MARKER,SKIN,WI/RULER AND LABELS: Brand: MEDLINE

## (undated) DEVICE — SYRINGE MED 10ML LUERLOCK TIP W/O SFTY DISP

## (undated) DEVICE — SYRINGE MEDICAL 3ML CLEAR PLASTIC STANDARD NON CONTROL LUERLOCK TIP DISPOSABLE

## (undated) DEVICE — SUTURE PROL SZ 2-0 L30IN NONABSORBABLE BLU L26MM CT-2 1/2 8411H

## (undated) DEVICE — SUTURE VCRL SZ 0 L36IN ABSRB UD L36MM CT-1 1/2 CIR J946H

## (undated) DEVICE — MHPB TOTAL HIP PACK: Brand: MEDLINE INDUSTRIES, INC.

## (undated) DEVICE — THE MONARCH® "B" CARTRIDGE IS A SINGLE-USE POLYPROPYLENE CARTRIDGE FOR POSTERIOR CHAMBER IOL DELIVERY: Brand: MONARCH® II

## (undated) DEVICE — GOWN,SIRUS,NONRNF,XLN/XL,20/CS: Brand: MEDLINE

## (undated) DEVICE — SYRINGE MED 50ML LUERLOCK TIP

## (undated) DEVICE — BITEBLOCK 54FR W/ DENT RIM BLOX

## (undated) DEVICE — GLOVE ORANGE PI 8   MSG9080

## (undated) DEVICE — SOLUTION IRRIG 1000ML 09% SOD CHL USP PIC PLAS CONTAINER

## (undated) DEVICE — PREMIUM DRY TRAY LF: Brand: MEDLINE INDUSTRIES, INC.

## (undated) DEVICE — PENCIL ELECSURG BPLR 18 GA DISP

## (undated) DEVICE — SOLUTION IV 1000 ML 0.9 NACL INJ USP EXCEL PLAS CONTAINER

## (undated) DEVICE — BLADE SAGITAL 18X90X1.27MM

## (undated) DEVICE — 6619 2 PTNT ISO SYS INCISE AREA&LT;(&GT;&&LT;)&GT;P: Brand: STERI-DRAPE™ IOBAN™ 2